# Patient Record
Sex: MALE | Race: WHITE | NOT HISPANIC OR LATINO | Employment: PART TIME | URBAN - METROPOLITAN AREA
[De-identification: names, ages, dates, MRNs, and addresses within clinical notes are randomized per-mention and may not be internally consistent; named-entity substitution may affect disease eponyms.]

---

## 2017-01-05 PROBLEM — I10 HYPERTENSION GOAL BP (BLOOD PRESSURE) < 140/90: Status: ACTIVE | Noted: 2017-01-05

## 2017-01-08 PROCEDURE — 99000 SPECIMEN HANDLING OFFICE-LAB: CPT | Performed by: FAMILY MEDICINE

## 2017-01-08 PROCEDURE — 82274 ASSAY TEST FOR BLOOD FECAL: CPT | Mod: 90 | Performed by: FAMILY MEDICINE

## 2017-01-09 DIAGNOSIS — Z12.11 COLON CANCER SCREENING: ICD-10-CM

## 2017-01-09 LAB — HEMOCCULT STL QL IA: POSITIVE

## 2017-01-10 ENCOUNTER — TELEPHONE (OUTPATIENT)
Dept: FAMILY MEDICINE | Facility: CLINIC | Age: 62
End: 2017-01-10

## 2017-01-10 NOTE — TELEPHONE ENCOUNTER
Notes Recorded by Clement De Jesus MD on 1/10/2017 at 7:28 AM  Please call patient. +fit card = blood in stool. Patient will now need a colonoscopy for screening for colon cancer. Patient should return to clinic asap if black or bloody stools. Please help set-up in next few weeks. Can see [facility] or our providers.     Patient:  Tad Olivas  [phone] (home)     Provider:  Clement De Jesus MD  Please call/evisit with questions or concerns.

## 2017-01-10 NOTE — TELEPHONE ENCOUNTER
Pt notified of provider message as written.  Pt verbalized good understanding.  Pt requests scheduling number be sent to him through [employer].  Sent.  Naomy Fernandez RN

## 2017-01-16 ENCOUNTER — PRE VISIT (OUTPATIENT)
Dept: UROLOGY | Facility: CLINIC | Age: 62
End: 2017-01-16

## 2017-01-16 ENCOUNTER — MYC MEDICAL ADVICE (OUTPATIENT)
Dept: FAMILY MEDICINE | Facility: CLINIC | Age: 62
End: 2017-01-16

## 2017-01-16 NOTE — TELEPHONE ENCOUNTER
Routing question about flomax to PCP to advise.    RN informed him to stay on BP medications to control HTN.    Gail Molina RN

## 2017-01-16 NOTE — TELEPHONE ENCOUNTER
Patient with a history of urethroplasty coming in for a cystoscopy. Chart reviewed and all records available. Message left reminding pt to come with a full bladder.

## 2017-01-16 NOTE — TELEPHONE ENCOUNTER
Routing to PCP to advise on medication for BP since he is NOT taking any currently.    Gail Molina RN

## 2017-01-23 ENCOUNTER — OFFICE VISIT (OUTPATIENT)
Dept: UROLOGY | Facility: CLINIC | Age: 62
End: 2017-01-23

## 2017-01-23 VITALS — HEIGHT: 71 IN | BODY MASS INDEX: 38.23 KG/M2 | WEIGHT: 273.1 LBS

## 2017-01-23 DIAGNOSIS — Z87.448 HISTORY OF URETHRAL STRICTURE: Primary | ICD-10-CM

## 2017-01-23 ASSESSMENT — PAIN SCALES - GENERAL
PAINLEVEL: NO PAIN (0)
PAINLEVEL: NO PAIN (0)

## 2017-01-23 NOTE — PATIENT INSTRUCTIONS
Return in 9 months with a full bladder for a cystoscopy and a urine flow test.    It was a pleasure meeting with you today.  Thank you for allowing me and my team the privilege of caring for you today.  YOU are the reason we are here, and I truly hope we provided you with the excellent service you deserve.  Please let us know if there is anything else we can do for you so that we can be sure you are leaving completely satisfied with your care experience.

## 2017-01-23 NOTE — MR AVS SNAPSHOT
After Visit Summary   1/23/2017    Tad Olivas    MRN: [id number]           Patient Information     Date Of Birth          1955        Visit Information        Provider Department      1/23/2017 1:30 PM Christiano Leger MD [facility] and [facility] for Prostate and Urologic Cancers        Today's Diagnoses     History of urethral stricture    -  1       Care Instructions    Return in 9 months with a full bladder for a cystoscopy and a urine flow test.    It was a pleasure meeting with you today.  Thank you for allowing me and my team the privilege of caring for you today.  YOU are the reason we are here, and I truly hope we provided you with the excellent service you deserve.  Please let us know if there is anything else we can do for you so that we can be sure you are leaving completely satisfied with your care experience.                Follow-ups after your visit        Your next 10 appointments already scheduled     Oct 23, 2017  2:30 PM   (Arrive by 2:15 PM)   Cystoscopy with Christiano Leger MD   [facility] for Prostate and Urologic Cancers ([facility])    [address] [place] [address] [phone]              Who to contact     Please call your clinic at [phone] to:    Ask questions about your health    Make or cancel appointments    Discuss your medicines    Learn about your test results    Speak to your doctor   If you have compliments or concerns about an experience at your clinic, or if you wish to file a complaint, please contact [facility] Patient Relations at [phone] or email us at [email or web address]         Additional Information About Your Visit        MyChart Information     [employer] gives you secure access to your electronic health record. If you see a primary care provider, you can also send messages to your care team and make appointments. If  "you have questions, please call your primary care clinic.  If you do not have a primary care provider, please call [phone] and they will assist you.      [employer] is an electronic gateway that provides easy, online access to your medical records. With [employer], you can request a clinic appointment, read your test results, renew a prescription or communicate with your care team.     To access your existing account, please contact your [facility] or call [phone] for assistance.        Care EveryWhere ID     This is your Care EveryWhere ID. This could be used by other organizations to access your [place] medical records  [id number]        Your Vitals Were     Height BMI (Body Mass Index)                1.803 m (5' 11\") 38.11 kg/m2           Blood Pressure from Last 3 Encounters:   12/29/16 129/87   11/07/16 112/75   10/14/16 116/88    Weight from Last 3 Encounters:   01/23/17 123.877 kg (273 lb 1.6 oz)   12/29/16 124.286 kg (274 lb)   11/07/16 117.935 kg (260 lb)              We Performed the Following     CYSTOURETHROSCOPY          Today's Medication Changes          These changes are accurate as of: 1/23/17  2:38 PM.  If you have any questions, ask your nurse or doctor.               These medicines have changed or have updated prescriptions.        Dose/Directions    ascorbic acid 500 MG tablet   Commonly known as:  VITAMIN C   This may have changed:  how much to take   Used for:  Frequent UTI, Preventative health care        Dose:  500 mg   Take 1 tablet by mouth daily.   Quantity:  100 tablet   Refills:  3                Primary Care Provider Office Phone # Fax #    Clement De Jesus [phone]       [facility] [address] [address] [facility] [address]        Thank you!     Thank you for choosing [facility] AND [facility] FOR PROSTATE AND UROLOGIC CANCERS  for your care. Our goal is always to provide you with excellent care. Hearing back from " our patients is one way we can continue to improve our services. Please take a few minutes to complete the written survey that you may receive in the mail after your visit with us. Thank you!             Your Updated Medication List - Protect others around you: Learn how to safely use, store and throw away your medicines at www.disposemymeds.org.          This list is accurate as of: 1/23/17  2:38 PM.  Always use your most recent med list.                   Brand Name Dispense Instructions for use    ALEVE PO      Take 220 mg by mouth daily       amLODIPine 5 MG tablet    NORVASC    30 tablet    Take 1 tablet (5 mg) by mouth daily New for blood pressure take with breakfast       ascorbic acid 500 MG tablet    VITAMIN C    100 tablet    Take 1 tablet by mouth daily.       aspirin 81 MG tablet      Take 81 mg by mouth daily       atorvastatin 80 MG tablet    LIPITOR    90 tablet    Take 1 tablet (80 mg) by mouth daily For cholesterol       finasteride 5 MG tablet    PROSCAR    90 tablet    Take 1 tablet (5 mg) by mouth daily       FLUoxetine 40 MG capsule    PROzac    90 capsule    Take 1 capsule (40 mg) by mouth daily       glucosamine-chondroitinoitin Caps     90 capsule    Take 1 each by mouth daily.       Multiple vitamin Tabs     90 tablet    Take 1 tablet by mouth daily.       omeprazole 20 MG tablet     45 tablet    Every other day. Take 30-60 minutes before a meal. To protect stomach and heartburn       order for DME     1 each    Equipment being ordered: automatic blood pressure cuff       tamsulosin 0.4 MG capsule    FLOMAX    180 capsule    Take 2 capsules (0.8 mg) by mouth daily This is double dosage for urine flow       VITAMIN D3 PO      Take 2,000 Units by mouth daily

## 2017-01-23 NOTE — Clinical Note
1/23/2017       RE: Tad Olivas  [address] [address]  [place] [address]     Dear Colleague,    Thank you for referring your patient, Tad Olivas, to the [facility] UROLOGY AND INST FOR PROSTATE AND UROLOGIC CANCERS at [facility]. Please see a copy of my visit note below.    Urethroplasty Follow-up Visit with Surveillance Urethroscopy    PRE-PROCEDURE DIAGNOSIS: History of urethral stricture  POST-PROCEDURE DIAGNOSIS: No evidence of urethral stricture   PROCEDURE: Urethroscopy    HISTORY: Tad Olivas is a 61 year old man 3 months status-post buccal graft dorsal onlay urethroplasty for urethral stricture. He has dribbling and anejaculation.     Pain in the perineum is absent.   Numbness in the perineum is absent.     Questionnaires reviewed. See flowsheet for details.    REVIEW OF OFFICE STUDIES:  Urinary Flow Rate  Peak Flow: 8.6 mL/s  Average Flow: 4.7 mL/s  Voided (mL): 94 mL  Residual Volume by Ultrasound: 79 mL     DESCRIPTION OF PROCEDURE:  After informed consent was obtained, the patient was brought to the procedure room where he was placed in the supine position with all pressure points well padded.  He was prepped and draped in a sterile fashion. A flexible cystoscope was introduced through a well-lubricated urethra.  The anterior urethra up to the point of reconstruction was normal in appearance. At the site of reconstruction there was not evidence of stricture recurrence. The caliber of the urethra at the reconstruction was estimated to be 20 F. The flexible cystoscope passed easily. The repair was from the mid shaft to the distal bulb. There were some annular rings proximal to the repair bu these were also >20F. The voluntary sphincter was identified and the scope withdrawn.    ASSESSMENT AND PLAN:  Excellent outcome after urethroplasty. The patient will follow-up in 9 months with with uroflowmetry, post-void residual urine volume measurement, Urethroplasty  "PROM, BERHANE, MSHQ, and CLSS and post-op questionnaires. Cystoscopy will be needed. If symptoms recur cystoscopy will be done sooner. Encouraged him to do Kegel's and \"milk\" the urethra after voiding or ejaculation.         Again, thank you for allowing me to participate in the care of your patient.      Sincerely,    Christiano Leger MD      "

## 2017-01-23 NOTE — NURSING NOTE
Chief Complaint   Patient presents with     Cystoscopy     History of urethrolplasty     Invasive Procedure Safety Checklist:    Procedure:     Action: Complete sections and checkboxes as appropriate.    Pre-procedure:  1. Patient ID Verified with 2 identifiers (Jayde and  or MRN) : YES    2. Procedure and site verified with patient/designee (when able) : YES    3. Accurate consent documentation in medical record : YES    4. H&P (or appropriate assessment) documented in medical record : YES  H&P must be up to 30 days prior to procedure an updated within 24 hours of                 Procedure as applicable.     5. Relevant diagnostic and radiology test results appropriately labeled and displayed as applicable : YES    6. Blood products, implants, devices, and/or special equipment available for the procedure as applicable : YES    7. Procedure site(s) marked with provider initials [Exclusions: None] : NO    8. Marking not required. Reason : Yes  Procedure does not require site marking    Time Out:     Time-Out performed immediately prior to starting procedure, including verbal and active participation of all team members addressing: YES    1. Correct patient identity.  2. Confirmed that the correct side and site are marked.  3. An accurate procedure to be done.  4. Agreement on the procedure to be done.  5. Correct patient position.  6. Relevant images and results are properly labeled and appropriately displayed.  7. The need to administer antibiotics or fluids for irrigation purposes during the procedure as applicable.  8. Safety precautions based on patient history or medication use.    During Procedure: Verification of correct person, site, and procedure occurs any time the responsibility for care of the patient is transferred to another member of the care team.

## 2017-01-23 NOTE — PROGRESS NOTES
"Urethroplasty Follow-up Visit with Surveillance Urethroscopy    PRE-PROCEDURE DIAGNOSIS: History of urethral stricture  POST-PROCEDURE DIAGNOSIS: No evidence of urethral stricture   PROCEDURE: Urethroscopy    HISTORY: Tad Olivas is a 61 year old man 3 months status-post buccal graft dorsal onlay urethroplasty for urethral stricture. He has dribbling and anejaculation.     Pain in the perineum is absent.   Numbness in the perineum is absent.     Questionnaires reviewed. See flowsheet for details.    REVIEW OF OFFICE STUDIES:  Urinary Flow Rate  Peak Flow: 8.6 mL/s  Average Flow: 4.7 mL/s  Voided (mL): 94 mL  Residual Volume by Ultrasound: 79 mL     DESCRIPTION OF PROCEDURE:  After informed consent was obtained, the patient was brought to the procedure room where he was placed in the supine position with all pressure points well padded.  He was prepped and draped in a sterile fashion. A flexible cystoscope was introduced through a well-lubricated urethra.  The anterior urethra up to the point of reconstruction was normal in appearance. At the site of reconstruction there was not evidence of stricture recurrence. The caliber of the urethra at the reconstruction was estimated to be 20 F. The flexible cystoscope passed easily. The repair was from the mid shaft to the distal bulb. There were some annular rings proximal to the repair bu these were also >20F. The voluntary sphincter was identified and the scope withdrawn.    ASSESSMENT AND PLAN:  Excellent outcome after urethroplasty. The patient will follow-up in 9 months with with uroflowmetry, post-void residual urine volume measurement, Urethroplasty PROM, BERHANE, MSHQ, and CLSS and post-op questionnaires. Cystoscopy will be needed. If symptoms recur cystoscopy will be done sooner. Encouraged him to do Kegel's and \"milk\" the urethra after voiding or ejaculation.     "

## 2017-02-13 DIAGNOSIS — R10.13 EPIGASTRIC PAIN: ICD-10-CM

## 2017-02-13 DIAGNOSIS — K29.70 GASTRITIS: ICD-10-CM

## 2017-02-13 RX ORDER — NICOTINE POLACRILEX 4 MG/1
GUM, CHEWING ORAL
Qty: 45 TABLET | Refills: 3 | Status: SHIPPED | OUTPATIENT
Start: 2017-02-13 | End: 2018-02-27

## 2017-02-24 ENCOUNTER — TRANSFERRED RECORDS (OUTPATIENT)
Dept: HEALTH INFORMATION MANAGEMENT | Facility: CLINIC | Age: 62
End: 2017-02-24

## 2017-07-27 DIAGNOSIS — I10 HYPERTENSION GOAL BP (BLOOD PRESSURE) < 140/90: ICD-10-CM

## 2017-07-27 DIAGNOSIS — F33.1 MAJOR DEPRESSIVE DISORDER, RECURRENT EPISODE, MODERATE (H): ICD-10-CM

## 2017-07-31 RX ORDER — FLUOXETINE 40 MG/1
40 CAPSULE ORAL DAILY
Qty: 90 CAPSULE | Refills: 0 | Status: SHIPPED | OUTPATIENT
Start: 2017-07-31 | End: 2017-10-27

## 2017-07-31 RX ORDER — AMLODIPINE BESYLATE 5 MG/1
5 TABLET ORAL DAILY
Qty: 30 TABLET | Refills: 0 | Status: SHIPPED | OUTPATIENT
Start: 2017-07-31 | End: 2017-09-11

## 2017-08-16 DIAGNOSIS — N34.2 URETHRITIS: ICD-10-CM

## 2017-08-16 RX ORDER — TAMSULOSIN HYDROCHLORIDE 0.4 MG/1
CAPSULE ORAL
Qty: 180 CAPSULE | Refills: 0 | Status: SHIPPED | OUTPATIENT
Start: 2017-08-16 | End: 2017-10-27

## 2017-09-11 DIAGNOSIS — I10 HYPERTENSION GOAL BP (BLOOD PRESSURE) < 140/90: ICD-10-CM

## 2017-09-12 RX ORDER — AMLODIPINE BESYLATE 5 MG/1
5 TABLET ORAL DAILY
Qty: 30 TABLET | Refills: 0 | Status: SHIPPED | OUTPATIENT
Start: 2017-09-12 | End: 2017-10-18

## 2017-09-12 NOTE — TELEPHONE ENCOUNTER
Pending appointment this month with Dr. De Jesus. 30 day supply sent.     Kerline Gonzalez RN   [facility]

## 2017-09-25 ENCOUNTER — MYC MEDICAL ADVICE (OUTPATIENT)
Dept: FAMILY MEDICINE | Facility: CLINIC | Age: 62
End: 2017-09-25

## 2017-10-18 DIAGNOSIS — I10 HYPERTENSION GOAL BP (BLOOD PRESSURE) < 140/90: ICD-10-CM

## 2017-10-18 RX ORDER — AMLODIPINE BESYLATE 5 MG/1
TABLET ORAL
Qty: 30 TABLET | Refills: 0 | Status: SHIPPED | OUTPATIENT
Start: 2017-10-18 | End: 2017-10-27

## 2017-10-24 ENCOUNTER — PRE VISIT (OUTPATIENT)
Dept: UROLOGY | Facility: CLINIC | Age: 62
End: 2017-10-24

## 2017-10-24 NOTE — TELEPHONE ENCOUNTER
Pt with a history of urethroplasty coming in for a cystoscopy. All records available. Message left asking pt to come with a full bladder for a flow/pvr.

## 2017-10-27 ENCOUNTER — OFFICE VISIT (OUTPATIENT)
Dept: FAMILY MEDICINE | Facility: CLINIC | Age: 62
End: 2017-10-27
Payer: COMMERCIAL

## 2017-10-27 VITALS
DIASTOLIC BLOOD PRESSURE: 79 MMHG | BODY MASS INDEX: 38.36 KG/M2 | WEIGHT: 274 LBS | HEIGHT: 71 IN | HEART RATE: 70 BPM | SYSTOLIC BLOOD PRESSURE: 138 MMHG | TEMPERATURE: 97.8 F | OXYGEN SATURATION: 93 %

## 2017-10-27 DIAGNOSIS — Z23 NEED FOR SHINGLES VACCINE: ICD-10-CM

## 2017-10-27 DIAGNOSIS — K21.9 GASTROESOPHAGEAL REFLUX DISEASE WITHOUT ESOPHAGITIS: ICD-10-CM

## 2017-10-27 DIAGNOSIS — Z23 NEED FOR PROPHYLACTIC VACCINATION AND INOCULATION AGAINST INFLUENZA: ICD-10-CM

## 2017-10-27 DIAGNOSIS — E78.5 HYPERLIPIDEMIA LDL GOAL <130: ICD-10-CM

## 2017-10-27 DIAGNOSIS — M79.673 HEEL PAIN, UNSPECIFIED LATERALITY: ICD-10-CM

## 2017-10-27 DIAGNOSIS — F33.1 MAJOR DEPRESSIVE DISORDER, RECURRENT EPISODE, MODERATE (H): ICD-10-CM

## 2017-10-27 DIAGNOSIS — I10 HYPERTENSION GOAL BP (BLOOD PRESSURE) < 140/90: Primary | ICD-10-CM

## 2017-10-27 DIAGNOSIS — N34.2 URETHRITIS: ICD-10-CM

## 2017-10-27 LAB
ALBUMIN SERPL-MCNC: 3.8 G/DL (ref 3.4–5)
ALP SERPL-CCNC: 87 U/L (ref 40–150)
ALT SERPL W P-5'-P-CCNC: 29 U/L (ref 0–70)
ANION GAP SERPL CALCULATED.3IONS-SCNC: 6 MMOL/L (ref 3–14)
AST SERPL W P-5'-P-CCNC: 16 U/L (ref 0–45)
BILIRUB SERPL-MCNC: 1.1 MG/DL (ref 0.2–1.3)
BUN SERPL-MCNC: 25 MG/DL (ref 7–30)
CALCIUM SERPL-MCNC: 8.8 MG/DL (ref 8.5–10.1)
CHLORIDE SERPL-SCNC: 104 MMOL/L (ref 94–109)
CHOLEST SERPL-MCNC: 199 MG/DL
CO2 SERPL-SCNC: 28 MMOL/L (ref 20–32)
CREAT SERPL-MCNC: 1.25 MG/DL (ref 0.66–1.25)
ERYTHROCYTE [DISTWIDTH] IN BLOOD BY AUTOMATED COUNT: 12.8 % (ref 10–15)
GFR SERPL CREATININE-BSD FRML MDRD: 58 ML/MIN/1.7M2
GLUCOSE SERPL-MCNC: 96 MG/DL (ref 70–99)
HCT VFR BLD AUTO: 43.6 % (ref 40–53)
HDLC SERPL-MCNC: 51 MG/DL
HGB BLD-MCNC: 14.6 G/DL (ref 13.3–17.7)
LDLC SERPL CALC-MCNC: 131 MG/DL
MCH RBC QN AUTO: 29.3 PG (ref 26.5–33)
MCHC RBC AUTO-ENTMCNC: 33.5 G/DL (ref 31.5–36.5)
MCV RBC AUTO: 88 FL (ref 78–100)
NONHDLC SERPL-MCNC: 148 MG/DL
PLATELET # BLD AUTO: 167 10E9/L (ref 150–450)
POTASSIUM SERPL-SCNC: 4.8 MMOL/L (ref 3.4–5.3)
PROT SERPL-MCNC: 6.8 G/DL (ref 6.8–8.8)
RBC # BLD AUTO: 4.98 10E12/L (ref 4.4–5.9)
SODIUM SERPL-SCNC: 138 MMOL/L (ref 133–144)
TRIGL SERPL-MCNC: 83 MG/DL
WBC # BLD AUTO: 5.3 10E9/L (ref 4–11)

## 2017-10-27 PROCEDURE — 36415 COLL VENOUS BLD VENIPUNCTURE: CPT | Performed by: FAMILY MEDICINE

## 2017-10-27 PROCEDURE — 85027 COMPLETE CBC AUTOMATED: CPT | Performed by: FAMILY MEDICINE

## 2017-10-27 PROCEDURE — 90736 HZV VACCINE LIVE SUBQ: CPT | Performed by: FAMILY MEDICINE

## 2017-10-27 PROCEDURE — 90471 IMMUNIZATION ADMIN: CPT | Performed by: FAMILY MEDICINE

## 2017-10-27 PROCEDURE — 80061 LIPID PANEL: CPT | Performed by: FAMILY MEDICINE

## 2017-10-27 PROCEDURE — 90686 IIV4 VACC NO PRSV 0.5 ML IM: CPT | Performed by: FAMILY MEDICINE

## 2017-10-27 PROCEDURE — 80053 COMPREHEN METABOLIC PANEL: CPT | Performed by: FAMILY MEDICINE

## 2017-10-27 PROCEDURE — 99214 OFFICE O/P EST MOD 30 MIN: CPT | Mod: 25 | Performed by: FAMILY MEDICINE

## 2017-10-27 PROCEDURE — 90472 IMMUNIZATION ADMIN EACH ADD: CPT | Performed by: FAMILY MEDICINE

## 2017-10-27 RX ORDER — FLUOXETINE 40 MG/1
40 CAPSULE ORAL DAILY
Qty: 90 CAPSULE | Refills: 1 | Status: SHIPPED | OUTPATIENT
Start: 2017-10-27 | End: 2018-06-18

## 2017-10-27 RX ORDER — AMLODIPINE BESYLATE 5 MG/1
5 TABLET ORAL DAILY
Qty: 90 TABLET | Refills: 1 | Status: SHIPPED | OUTPATIENT
Start: 2017-10-27 | End: 2018-07-19

## 2017-10-27 RX ORDER — TAMSULOSIN HYDROCHLORIDE 0.4 MG/1
CAPSULE ORAL
Qty: 180 CAPSULE | Refills: 3 | Status: SHIPPED | OUTPATIENT
Start: 2017-10-27 | End: 2018-11-06

## 2017-10-27 ASSESSMENT — PATIENT HEALTH QUESTIONNAIRE - PHQ9
SUM OF ALL RESPONSES TO PHQ QUESTIONS 1-9: 3
5. POOR APPETITE OR OVEREATING: SEVERAL DAYS

## 2017-10-27 ASSESSMENT — ANXIETY QUESTIONNAIRES
GAD7 TOTAL SCORE: 4
3. WORRYING TOO MUCH ABOUT DIFFERENT THINGS: SEVERAL DAYS
7. FEELING AFRAID AS IF SOMETHING AWFUL MIGHT HAPPEN: NOT AT ALL
6. BECOMING EASILY ANNOYED OR IRRITABLE: NOT AT ALL
1. FEELING NERVOUS, ANXIOUS, OR ON EDGE: SEVERAL DAYS
5. BEING SO RESTLESS THAT IT IS HARD TO SIT STILL: NOT AT ALL
2. NOT BEING ABLE TO STOP OR CONTROL WORRYING: SEVERAL DAYS

## 2017-10-27 NOTE — NURSING NOTE
"Chief Complaint   Patient presents with     Hypertension     Lipids     Flu Shot       Initial /90  Pulse 70  Temp 97.8  F (36.6  C) (Oral)  Ht 5' 11\" (1.803 m)  Wt 274 lb (124.3 kg)  SpO2 93%  BMI 38.22 kg/m2 Estimated body mass index is 38.22 kg/(m^2) as calculated from the following:    Height as of this encounter: 5' 11\" (1.803 m).    Weight as of this encounter: 274 lb (124.3 kg).  Medication Reconciliation: complete  Naomy Riley, MISBAH    "

## 2017-10-27 NOTE — NURSING NOTE
Screening Questionnaire for Adult Immunization    Are you sick today?   No   Do you have allergies to medications, food, a vaccine component or latex?   No   Have you ever had a serious reaction after receiving a vaccination?   No   Do you have a long-term health problem with heart disease, lung disease, asthma, kidney disease, metabolic disease (e.g. diabetes), anemia, or other blood disorder?   No   Do you have cancer, leukemia, HIV/AIDS, or any other immune system problem?   No   In the past 3 months, have you taken medications that affect  your immune system, such as prednisone, other steroids, or anticancer drugs; drugs for the treatment of rheumatoid arthritis, Crohn s disease, or psoriasis; or have you had radiation treatments?   No   Have you had a seizure, or a brain or other nervous system problem?   No   During the past year, have you received a transfusion of blood or blood     products, or been given immune (gamma) globulin or antiviral drug?   No   For women: Are you pregnant or is there a chance you could become        pregnant during the next month?   No   Have you received any vaccinations in the past 4 weeks?   No     Immunization questionnaire answers were all negative.        Per orders of Dr. De Jesus, injection of shingles  given by Naomy Riley. Patient instructed to remain in clinic for 15 minutes afterwards, and to report any adverse reaction to me immediately.       Screening performed by Naomy Riley on 10/27/2017 at 10:03 AM.

## 2017-10-27 NOTE — MR AVS SNAPSHOT
After Visit Summary   10/27/2017    Tad Olivas    MRN: [id number]           Patient Information     Date Of Birth          1955        Visit Information        Provider Department      10/27/2017 9:00 AM Clement De Jesus MD [facility]        Today's Diagnoses     Hypertension goal BP (blood pressure) < 140/90    -  1    Hyperlipidemia LDL goal <130        Urethritis        Major depressive disorder, recurrent episode, moderate (H)        Gastroesophageal reflux disease without esophagitis        Heel pain, unspecified laterality        Need for prophylactic vaccination and inoculation against influenza        Need for shingles vaccine           Follow-ups after your visit        Your next 10 appointments already scheduled     Oct 30, 2017  2:30 PM CDT   (Arrive by 2:15 PM)   Cystoscopy with Christiano Leger MD   [facility] and [facility] for Prostate and Urologic Cancers ([facility])    [address] [place] [address] [phone]              Who to contact     If you have questions or need follow up information about today's clinic visit or your schedule please contact [facility] directly at [phone].  Normal or non-critical lab and imaging results will be communicated to you by [employer], letter or phone within 4 business days after the clinic has received the results. If you do not hear from us within 7 days, please contact the clinic through [employer] or phone. If you have a critical or abnormal lab result, we will notify you by phone as soon as possible.  Submit refill requests through [employer] or call your pharmacy and they will forward the refill request to us. Please allow 3 business days for your refill to be completed.          Additional Information About Your Visit        C[employer]art Information     [employer] gives you secure access to your electronic health record. If you see a primary care  "provider, you can also send messages to your care team and make appointments. If you have questions, please call your primary care clinic.  If you do not have a primary care provider, please call [phone] and they will assist you.        Care EveryWhere ID     This is your Care EveryWhere ID. This could be used by other organizations to access your [place] medical records  [id number]        Your Vitals Were     Pulse Temperature Height Pulse Oximetry BMI (Body Mass Index)       70 97.8  F (36.6  C) (Oral) 5' 11\" (1.803 m) 93% 38.22 kg/m2        Blood Pressure from Last 3 Encounters:   10/27/17 138/79   12/29/16 129/87   11/07/16 112/75    Weight from Last 3 Encounters:   10/27/17 274 lb (124.3 kg)   01/23/17 273 lb 1.6 oz (123.9 kg)   12/29/16 274 lb (124.3 kg)              We Performed the Following     CBC with platelets     Comprehensive metabolic panel (BMP + Alb, Alk Phos, ALT, AST, Total. Bili, TP)     DEPRESSION ACTION PLAN (DAP)     FLU VAC, SPLIT VIRUS IM > 3 YO (QUADRIVALENT) [95730]     Lipid Profile (Chol, Trig, HDL, LDL calc)     Vaccine Administration, Initial [59113]     ZOSTER VACC LIVE SUBQ NJX          Today's Medication Changes          These changes are accurate as of: 10/27/17 11:53 AM.  If you have any questions, ask your nurse or doctor.               These medicines have changed or have updated prescriptions.        Dose/Directions    amLODIPine 5 MG tablet   Commonly known as:  NORVASC   This may have changed:  See the new instructions.   Used for:  Hypertension goal BP (blood pressure) < 140/90   Changed by:  Clement De Jesus MD        Dose:  5 mg   Take 1 tablet (5 mg) by mouth daily For blood pressure Pharmacy ok to hold prescription until due   Quantity:  90 tablet   Refills:  1       ascorbic acid 500 MG tablet   Commonly known as:  VITAMIN C   This may have changed:  how much to take   Used for:  Frequent UTI, Preventative health care        Dose:  500 mg   Take 1 tablet by " mouth daily.   Quantity:  100 tablet   Refills:  3       FLUoxetine 40 MG capsule   Commonly known as:  PROzac   This may have changed:  additional instructions   Used for:  Major depressive disorder, recurrent episode, moderate (H)   Changed by:  Clement De Jesus MD        Dose:  40 mg   Take 1 capsule (40 mg) by mouth daily Pharmacy ok to hold prescription until due   Quantity:  90 capsule   Refills:  1       tamsulosin 0.4 MG capsule   Commonly known as:  FLOMAX   This may have changed:  See the new instructions.   Used for:  Urethritis   Changed by:  Clement De Jesus MD        TAKE TWO CAPSULES BY MOUTH EVERY DAY FOR URINE FLOW   Quantity:  180 capsule   Refills:  3            Where to get your medicines      These medications were sent to [id number] - [place] - [address] [address]     Phone:  [phone]     amLODIPine 5 MG tablet    FLUoxetine 40 MG capsule    tamsulosin 0.4 MG capsule                Primary Care Provider Office Phone # Fax #    Clement De Jesus [phone] [address] [facility] [address]        Equal Access to Services     [facility]: Hadii aad ku hadasho Soomaali, waaxda luqadaha, qaybta kaalmada adeegyada, waxay kirby haytrina grimm . So [facility] [phone].    ATENCIÓN: Si habla español, tiene a greer disposición servicios gratuitos de asistencia lingüística. Llame al [phone].    We comply with applicable federal civil rights laws and [place] laws. We do not discriminate on the basis of race, color, national origin, age, disability, sex, sexual orientation, or gender identity.            Thank you!     Thank you for choosing [facility]  for your care. Our goal is always to provide you with excellent care. Hearing back from our patients is one way we can continue to improve our services. Please take a few minutes to complete the written survey that you may receive in the mail  after your visit with us. Thank you!             Your Updated Medication List - Protect others around you: Learn how to safely use, store and throw away your medicines at www.disposemymeds.org.          This list is accurate as of: 10/27/17 11:53 AM.  Always use your most recent med list.                   Brand Name Dispense Instructions for use Diagnosis    ALEVE PO      Take 220 mg by mouth daily        amLODIPine 5 MG tablet    NORVASC    90 tablet    Take 1 tablet (5 mg) by mouth daily For blood pressure Pharmacy ok to hold prescription until due    Hypertension goal BP (blood pressure) < 140/90       ascorbic acid 500 MG tablet    VITAMIN C    100 tablet    Take 1 tablet by mouth daily.    Frequent UTI, Preventative health care       aspirin 81 MG tablet      Take 81 mg by mouth daily        atorvastatin 80 MG tablet    LIPITOR    90 tablet    Take 1 tablet (80 mg) by mouth daily For cholesterol    Hyperlipidemia LDL goal <130       finasteride 5 MG tablet    PROSCAR    90 tablet    Take 1 tablet (5 mg) by mouth daily    Enlarged prostate       FLUoxetine 40 MG capsule    PROzac    90 capsule    Take 1 capsule (40 mg) by mouth daily Pharmacy ok to hold prescription until due    Major depressive disorder, recurrent episode, moderate (H)       glucosamine-chondroitinoitin Caps     90 capsule    Take 1 each by mouth daily.    Elbow pain       Multiple vitamin Tabs     90 tablet    Take 1 tablet by mouth daily.    Preventative health care       omeprazole 20 MG tablet     45 tablet    Every other day. Take 30-60 minutes before a meal. To protect stomach and heartburn    Epigastric pain, Gastritis       order for DME     1 each    Equipment being ordered: automatic blood pressure cuff    Hypertension goal BP (blood pressure) < 140/90       tamsulosin 0.4 MG capsule    FLOMAX    180 capsule    TAKE TWO CAPSULES BY MOUTH EVERY DAY FOR URINE FLOW    Urethritis       VITAMIN D3 PO      Take 2,000 Units by mouth daily

## 2017-10-27 NOTE — PROGRESS NOTES
SUBJECTIVE:   CC: Tad Olivas is an 62 year old male who presents for:  Blood pressure/ chol , spot on back, depression and right heel pain.  Seeing urology for urethral stricture.   Outside blood pressure reading - has cuff at home. Exercise needs more.   No chest pain or shortness of breath. gerd stable. No dysphagia.   No black or bloody stools. Urine stable - seeing urology next week. No hematuria.   Emotionally doing ok.   right heel pain on/off. No injury. Not too painful in AM.   S/p knee replacement. gerd stable qod prilosec.  Need for prophylactic vaccination and inoculation against influenza    Past Medical History:   Diagnosis Date     Biceps rupture, distal      Degenerative joint disease     right knee OA     Depressive disorder      Elevated cholesterol      Erectile dysfunction 12/29/2010     Hypertension      Hypertension goal BP (blood pressure) < 140/90      Moderate major depression (H) 12/29/2010     Renal cysts, acquired, bilateral 9/26/2012       Past Surgical History:   Procedure Laterality Date     BIOPSY  2017    colon     COLONOSCOPY  2017     ORTHOPEDIC SURGERY      right knee replacement     REPAIR TENDON BICEPS DISTAL UPPER EXTREMITY      10/15/10     SOFT TISSUE SURGERY       SURGICAL HISTORY OF -       rt shoulder cartilage repair     SURGICAL HISTORY OF -       cartilage .ligament ,arthroscopy     URETHROPLASTY WITH BUCCAL GRAFT N/A 10/14/2016    Procedure: URETHROPLASTY WITH BUCCAL GRAFT;  Surgeon: Christiano Leger MD;  Location: [facility]     UROLOGY PROCEDURE                         DATE:  10.15.2016    urithral stricture        Family History   Problem Relation Age of Onset     Coronary Artery Disease Mother      Hyperlipidemia Mother      DIABETES Father      CEREBROVASCULAR DISEASE Father      Breast Cancer Father      Anesthesia Reaction Father        Social History   Substance Use Topics     Smoking status: Former Smoker     Quit date: 12/30/1970     Smokeless tobacco:  "Never Used      Comment: no 2nd hand smoke exposure     Alcohol use Yes      Comment: occ.       ROS:  All other ROS negative  OBJECTIVE:  EXAM:  /79  Pulse 70  Temp 97.8  F (36.6  C) (Oral)  Ht 5' 11\" (1.803 m)  Wt 274 lb (124.3 kg)  SpO2 93%  BMI 38.22 kg/m2  GENERAL APPEARANCE: healthy, alert and no distress  EYES: EOMI,  PERRL  NECK: no adenopathy, no asymmetry, masses, or scars and thyroid normal to palpation  RESP: lungs clear to auscultation - no rales, rhonchi or wheezes  CV: regular rates and rhythm, normal S1 S2, no S3 or S4 and no murmur, click or rub -  ABDOMEN:  soft, nontender, no HSM or masses and bowel sounds normal  MS: extremities normal- no gross deformities noted, no evidence of inflammation in joints, FROM in all extremities.  MS: tenderness left plantar fascial insertion site  SKIN: no suspicious lesions or rashes  NEURO: Normal strength and tone, sensory exam grossly normal, mentation intact and speech normal  PSYCH: mentation appears normal and affect normal/bright  LYMPHATICS: No axillary, cervical or supraclavicular nodes    ASSESSMENT / PLAN:  (I10) Hypertension goal BP (blood pressure) < 140/90  (primary encounter diagnosis)  Comment: a little high  Plan: Comprehensive metabolic panel (BMP + Alb, Alk         Phos, ALT, AST, Total. Bili, TP), amLODIPine         (NORVASC) 5 MG tablet        Weight loss and self-monitor. Add hctz if worse. Recheck in 6 months      (E78.5) Hyperlipidemia LDL goal <130  Comment: fasting  Plan: Comprehensive metabolic panel (BMP + Alb, Alk         Phos, ALT, AST, Total. Bili, TP), Lipid Profile        (Chol, Trig, HDL, LDL calc)        Continue lipitor. Exercise and weight loss. Chest pain or shortness of breath to er.     (N34.2) Urethritis  Plan: tamsulosin (FLOMAX) 0.4 MG capsule        Stable per urology.    (F33.1) Major depressive disorder, recurrent episode, moderate (H)  Comment: stable  Plan: DEPRESSION ACTION PLAN (DAP), FLUoxetine         " (PROZAC) 40 MG capsule        If SUICIAL IDEATION OR HOMOCIDAL IDEATION OR YAKOV TO ER. Recheck in 6 months  Sooner if worse. Call/email with questions/concerns    (K21.9) Gastroesophageal reflux disease without esophagitis  Comment: stable  Plan: CBC with platelets        Continue prilosec qod. egd if worse. Weight loss.     (M79.583) Heel pain, unspecified laterality  Comment: likely plantar fasciitis   Plan: stretching calf muscles/tylenol and good arch support. Follow-up podiatry if worse.     (Z23) Need for prophylactic vaccination and inoculation against influenza  Plan: Vaccine Administration, Initial [89445], FLU         VAC, SPLIT VIRUS IM > 3 YO (QUADRIVALENT)         [90187]            Clement De Jesus    Physical   Annual:     Getting at least 3 servings of Calcium per day::  Yes    Bi-annual eye exam::  NO    Dental care twice a year::  Yes    Sleep apnea or symptoms of sleep apnea::  Daytime drowsiness    Diet::  Regular (no restrictions)    Frequency of exercise::  None    Taking medications regularly::  Yes    Medication side effects::  None    Additional concerns today::  YES      Today's PHQ-2 Score:   PHQ-2 ( 1999 Pfizer) 10/27/2017   Q1: Little interest or pleasure in doing things 0   Q2: Feeling down, depressed or hopeless 0   PHQ-2 Score 0   Q1: Little interest or pleasure in doing things Not at all   Q2: Feeling down, depressed or hopeless Not at all   PHQ-2 Score 0               Review of Systems    OBJECTIVE:     Physical Exam      ASSESSMENT/PLAN:             Injectable Influenza Immunization Documentation    1.  Is the person to be vaccinated sick today?   No    2. Does the person to be vaccinated have an allergy to a component   of the vaccine?   No  Egg Allergy Algorithm Link    3. Has the person to be vaccinated ever had a serious reaction   to influenza vaccine in the past?   No    4. Has the person to be vaccinated ever had Guillain-Barré syndrome?   No    Form completed by Naomy  Rosemary, CMA

## 2017-10-28 ASSESSMENT — ANXIETY QUESTIONNAIRES: GAD7 TOTAL SCORE: 4

## 2017-10-30 ENCOUNTER — OFFICE VISIT (OUTPATIENT)
Dept: UROLOGY | Facility: CLINIC | Age: 62
End: 2017-10-30

## 2017-10-30 VITALS
SYSTOLIC BLOOD PRESSURE: 126 MMHG | BODY MASS INDEX: 38.85 KG/M2 | WEIGHT: 277.5 LBS | HEIGHT: 71 IN | HEART RATE: 65 BPM | DIASTOLIC BLOOD PRESSURE: 84 MMHG

## 2017-10-30 DIAGNOSIS — Z87.448 HISTORY OF URETHRAL STRICTURE: Primary | ICD-10-CM

## 2017-10-30 ASSESSMENT — PAIN SCALES - GENERAL: PAINLEVEL: NO PAIN (0)

## 2017-10-30 NOTE — PROGRESS NOTES
Urethroplasty Follow-up Visit with Surveillance Urethroscopy    PRE-PROCEDURE DIAGNOSIS: History of urethral stricture  POST-PROCEDURE DIAGNOSIS: No evidence of urethral stricture   PROCEDURE: Urethroscopy    HISTORY: Tad Olivas is a 62 year old man 16 months status-post buccal graft dorsal onlay urethroplasty for urethral stricture.     BMG complaints: No  Positioning complaints: No  Perineal / Genital complaints: No  Urinary incontinence: No    He says that his flow has never been as good as he hoped for after the urethroplasty.     Questionnaires reviewed. See flowsheet for details.    REVIEW OF OFFICE STUDIES:  Urinary Flow Rate  Peak Flow: 8 mL/s  Average Flow: 4.4 mL/s  Voided (mL): 62 mL  Residual Volume by Ultrasound: 13 mL     DESCRIPTION OF PROCEDURE:  After informed consent was obtained, the patient was brought to the procedure room where he was placed in the supine position with all pressure points well padded.  He was prepped and draped in a sterile fashion. A flexible cystoscope was introduced through a well-lubricated urethra.  The anterior urethra up to the point of reconstruction was normal in appearance. At the site of reconstruction there was not evidence of stricture recurrence. The narrowest caliber of the urethra at the reconstruction was estimated to be 18F and this was located in several places throughout the buccal. The flexible cystoscope passed easily. The voluntary sphincter was identified and was normal. The prostate was small. The bladder was normal in appearance.     ASSESSMENT AND PLAN:  Excellent anatomic outcome after urethroplasty but still with slow flow. Has not seen improvement with Flomax. So now we will pursue UDS.   If UDS shows:  1. Flaccid bladder then option is clean intermittent catheterization or observation of slow flow  2. BPO then add finasteride  3. Urethral stricture obstruction then refer back to me.

## 2017-10-30 NOTE — NURSING NOTE
Chief Complaint   Patient presents with     Cystoscopy     Pt with a history of urethroplasty coming in for a cystoscopy.     Cyndi Teran

## 2017-10-30 NOTE — MR AVS SNAPSHOT
After Visit Summary   10/30/2017    Tad Olivas    MRN: [id number]           Patient Information     Date Of Birth          1955        Visit Information        Provider Department      10/30/2017 2:30 PM Christiano Leger MD [facility]        Care Instructions    Follow up with Janet Munson with a Urodynamics.    It was a pleasure meeting with you today.  Thank you for allowing me and my team the privilege of caring for you today.  YOU are the reason we are here, and I truly hope we provided you with the excellent service you deserve.  Please let us know if there is anything else we can do for you so that we can be sure you are leaving completely satisfied with your care experience.                  Follow-ups after your visit        Your next 10 appointments already scheduled     Nov 22, 2017  3:30 PM CST   (Arrive by 3:15 PM)   Urodynamics with Janet Dodd PA-C   [facility] ([facility])    [address] [place] [id number] [phone]              Who to contact     Please call your clinic at [phone] to:    Ask questions about your health    Make or cancel appointments    Discuss your medicines    Learn about your test results    Speak to your doctor   If you have compliments or concerns about an experience at your clinic, or if you wish to file a complaint, please contact [facility] Patient Relations at [phone] or email us at [email or web address] [email or web address]         Additional Information About Your Visit        MyChart Information     [employer] gives you secure access to your electronic health record. If you see a primary care provider, you can also send messages to your care team and make appointments. If you have questions, please call your primary care clinic.  If you do not have a  "primary care provider, please call [phone] and they will assist you.      [employer] is an electronic gateway that provides easy, online access to your medical records. With [employer], you can request a clinic appointment, read your test results, renew a prescription or communicate with your care team.     To access your existing account, please contact your [facility] or call [phone] for assistance.        Care EveryWhere ID     This is your Care EveryWhere ID. This could be used by other organizations to access your [facility] medical records  [id number]        Your Vitals Were     Pulse Height BMI (Body Mass Index)             65 1.803 m (5' 11\") 38.7 kg/m2          Blood Pressure from Last 3 Encounters:   10/30/17 126/84   10/27/17 138/79   12/29/16 129/87    Weight from Last 3 Encounters:   10/30/17 125.9 kg (277 lb 8 oz)   10/27/17 124.3 kg (274 lb)   01/23/17 123.9 kg (273 lb 1.6 oz)              Today, you had the following     No orders found for display         Today's Medication Changes          These changes are accurate as of: 10/30/17  3:31 PM.  If you have any questions, ask your nurse or doctor.               These medicines have changed or have updated prescriptions.        Dose/Directions    ascorbic acid 500 MG tablet   Commonly known as:  VITAMIN C   This may have changed:  how much to take   Used for:  Frequent UTI, Preventative health care        Dose:  500 mg   Take 1 tablet by mouth daily.   Quantity:  100 tablet   Refills:  3                Primary Care Provider Office Phone # Fax #    Lcement Matheus De Jesus [phone] [address] [facility] [address]        Equal Access to Services     BAYRON GREGG AH: tomasz Blake, rogelio tipton. So [facility] [phone].    ATENCIÓN: Si habla español, tiene a greer disposición servicios gratuitos de asistencia " lingüística. Gilda al [phone].    We comply with applicable federal civil rights laws and [place] laws. We do not discriminate on the basis of race, color, national origin, age, disability, sex, sexual orientation, or gender identity.            Thank you!     Thank you for choosing [facility] AND [facility] FOR PROSTATE AND UROLOGIC CANCERS  for your care. Our goal is always to provide you with excellent care. Hearing back from our patients is one way we can continue to improve our services. Please take a few minutes to complete the written survey that you may receive in the mail after your visit with us. Thank you!             Your Updated Medication List - Protect others around you: Learn how to safely use, store and throw away your medicines at www.disposemymeds.org.          This list is accurate as of: 10/30/17  3:31 PM.  Always use your most recent med list.                   Brand Name Dispense Instructions for use Diagnosis    ALEVE PO      Take 220 mg by mouth daily        amLODIPine 5 MG tablet    NORVASC    90 tablet    Take 1 tablet (5 mg) by mouth daily For blood pressure Pharmacy ok to hold prescription until due    Hypertension goal BP (blood pressure) < 140/90       ascorbic acid 500 MG tablet    VITAMIN C    100 tablet    Take 1 tablet by mouth daily.    Frequent UTI, Preventative health care       aspirin 81 MG tablet      Take 81 mg by mouth daily        atorvastatin 80 MG tablet    LIPITOR    90 tablet    Take 1 tablet (80 mg) by mouth daily For cholesterol    Hyperlipidemia LDL goal <130       finasteride 5 MG tablet    PROSCAR    90 tablet    Take 1 tablet (5 mg) by mouth daily    Enlarged prostate       FLUoxetine 40 MG capsule    PROzac    90 capsule    Take 1 capsule (40 mg) by mouth daily Pharmacy ok to hold prescription until due    Major depressive disorder, recurrent episode, moderate (H)       glucosamine-chondroitinoitin Caps     90 capsule    Take 1 each by mouth daily.     Elbow pain       Multiple vitamin Tabs     90 tablet    Take 1 tablet by mouth daily.    Preventative health care       * omeprazole 20 MG tablet     45 tablet    Every other day. Take 30-60 minutes before a meal. To protect stomach and heartburn    Epigastric pain, Gastritis       * omeprazole 20 MG CR capsule    priLOSEC     TAKE 1 TABLET BY MOUTH EVERY OTHER DAY 30 TO 60 MINUTES BEFORE A MEAL TO PROTECT STOMACH AND HEARTBURN        order for DME     1 each    Equipment being ordered: automatic blood pressure cuff    Hypertension goal BP (blood pressure) < 140/90       tamsulosin 0.4 MG capsule    FLOMAX    180 capsule    TAKE TWO CAPSULES BY MOUTH EVERY DAY FOR URINE FLOW    Urethritis       VITAMIN D3 PO      Take 2,000 Units by mouth daily        * Notice:  This list has 2 medication(s) that are the same as other medications prescribed for you. Read the directions carefully, and ask your doctor or other care provider to review them with you.

## 2017-10-30 NOTE — NURSING NOTE
Invasive Procedure Safety Checklist:    Procedure:     Action: Complete sections and checkboxes as appropriate.    Pre-procedure:  1. Patient ID Verified with 2 identifiers (Jayde and  or MRN) : YES    2. Procedure and site verified with patient/designee (when able) : YES    3. Accurate consent documentation in medical record : YES    4. H&P (or appropriate assessment) documented in medical record : YES  H&P must be up to 30 days prior to procedure an updated within 24 hours of                 Procedure as applicable.     5. Relevant diagnostic and radiology test results appropriately labeled and displayed as applicable : YES    6. Blood products, implants, devices, and/or special equipment available for the procedure as applicable : YES    7. Procedure site(s) marked with provider initials [Exclusions: N/A] : NO    8. Marking not required. Reason : Yes  Procedure does not require site marking    Time Out:     Time-Out performed immediately prior to starting procedure, including verbal and active participation of all team members addressing: YES      1. Correct patient identity.  2. Confirmed that the correct side and site are marked.  3. An accurate procedure to be done.  4. Agreement on the procedure to be done.  5. Correct patient position.  6. Relevant images and results are properly labeled and appropriately displayed.  7. The need to administer antibiotics or fluids for irrigation purposes during the procedure as applicable.  8. Safety precautions based on patient history or medication use.    During Procedure: Verification of correct person, site, and procedure occurs any time the responsibility for care of the patient is transferred to another member of the care team.

## 2017-10-30 NOTE — LETTER
10/30/2017       RE: Tad Olivas  [address] [address]     Dear Colleague,    Thank you for referring your patient, Tad Olivas, to the [facility] AND [facility] FOR PROSTATE AND UROLOGIC CANCERS at [facility]. Please see a copy of my visit note below.    Urethroplasty Follow-up Visit with Surveillance Urethroscopy    PRE-PROCEDURE DIAGNOSIS: History of urethral stricture  POST-PROCEDURE DIAGNOSIS: No evidence of urethral stricture   PROCEDURE: Urethroscopy    HISTORY: Tad Olivas is a 62 year old man 16 months status-post buccal graft dorsal onlay urethroplasty for urethral stricture.     BMG complaints: No  Positioning complaints: No  Perineal / Genital complaints: No  Urinary incontinence: No    He says that his flow has never been as good as he hoped for after the urethroplasty.     Questionnaires reviewed. See flowsheet for details.    REVIEW OF OFFICE STUDIES:  Urinary Flow Rate  Peak Flow: 8 mL/s  Average Flow: 4.4 mL/s  Voided (mL): 62 mL  Residual Volume by Ultrasound: 13 mL     DESCRIPTION OF PROCEDURE:  After informed consent was obtained, the patient was brought to the procedure room where he was placed in the supine position with all pressure points well padded.  He was prepped and draped in a sterile fashion. A flexible cystoscope was introduced through a well-lubricated urethra.  The anterior urethra up to the point of reconstruction was normal in appearance. At the site of reconstruction there was not evidence of stricture recurrence. The narrowest caliber of the urethra at the reconstruction was estimated to be 18F and this was located in several places throughout the buccal. The flexible cystoscope passed easily. The voluntary sphincter was identified and was normal. The prostate was small. The bladder was normal in appearance.     ASSESSMENT AND PLAN:  Excellent anatomic outcome after urethroplasty but still with slow flow. Has  not seen improvement with Flomax. So now we will pursue UDS.   If UDS shows:  1. Flaccid bladder then option is clean intermittent catheterization or observation of slow flow  2. BPO then add finasteride  3. Urethral stricture obstruction then refer back to me.         Again, thank you for allowing me to participate in the care of your patient.      Sincerely,    Christiano Leger MD

## 2017-10-30 NOTE — PATIENT INSTRUCTIONS
"Follow up with Janet Munson with a Urodynamics.    It was a pleasure meeting with you today.  Thank you for allowing me and my team the privilege of caring for you today.  YOU are the reason we are here, and I truly hope we provided you with the excellent service you deserve.  Please let us know if there is anything else we can do for you so that we can be sure you are leaving completely satisfied with your care experience.          AFTER YOUR CYSTOSCOPY        You have just completed a cystoscopy, or \"cysto\", which allowed your physician to learn more about your bladder (or to remove a stent placed after surgery). We suggest that you continue to avoid caffeine, fruit juice, and alcohol for the next 24 hours, however, you are encouraged to return to your normal activities.         A few things that are considered normal after your cystoscopy:     * Small amount of bleeding (or spotting) that clears within the next 24 hours     * Slight burning sensation with urination     * Sensation to of needing to avoid more frequently     * The feeling of \"air\" in your urine     * Mild discomfort that is relieved with Tylenol        Please contact our office promptly if you:     * Develop a fever above 101 degrees     * Are unable to urinate     * Develop bright red blood that does not stop     * Severe pain or swelling         Please contact our office with any concerns or questions @[email or web address].  "

## 2017-11-01 ENCOUNTER — PRE VISIT (OUTPATIENT)
Dept: UROLOGY | Facility: CLINIC | Age: 62
End: 2017-11-01

## 2017-11-08 ENCOUNTER — MYC MEDICAL ADVICE (OUTPATIENT)
Dept: FAMILY MEDICINE | Facility: CLINIC | Age: 62
End: 2017-11-08

## 2017-12-04 DIAGNOSIS — I10 HYPERTENSION GOAL BP (BLOOD PRESSURE) < 140/90: ICD-10-CM

## 2017-12-05 RX ORDER — AMLODIPINE BESYLATE 5 MG/1
TABLET ORAL
Qty: 30 TABLET | Refills: 0 | OUTPATIENT
Start: 2017-12-05

## 2017-12-22 ENCOUNTER — PRE VISIT (OUTPATIENT)
Dept: UROLOGY | Facility: CLINIC | Age: 62
End: 2017-12-22

## 2018-01-08 DIAGNOSIS — N40.0 ENLARGED PROSTATE: ICD-10-CM

## 2018-01-08 RX ORDER — FINASTERIDE 5 MG/1
5 TABLET, FILM COATED ORAL DAILY
Qty: 90 TABLET | Refills: 3 | Status: SHIPPED | OUTPATIENT
Start: 2018-01-08 | End: 2019-02-22

## 2018-01-08 RX ORDER — FINASTERIDE 5 MG/1
5 TABLET, FILM COATED ORAL DAILY
Qty: 90 TABLET | Refills: 3 | Status: CANCELLED | OUTPATIENT
Start: 2018-01-08

## 2018-02-24 ENCOUNTER — PRE VISIT (OUTPATIENT)
Dept: UROLOGY | Facility: CLINIC | Age: 63
End: 2018-02-24

## 2018-02-27 DIAGNOSIS — R10.13 EPIGASTRIC PAIN: ICD-10-CM

## 2018-02-27 DIAGNOSIS — K29.70 GASTRITIS: ICD-10-CM

## 2018-03-08 DIAGNOSIS — E78.5 HYPERLIPIDEMIA LDL GOAL <130: ICD-10-CM

## 2018-03-08 RX ORDER — ATORVASTATIN CALCIUM 80 MG/1
TABLET, FILM COATED ORAL
Qty: 90 TABLET | Refills: 2 | Status: SHIPPED | OUTPATIENT
Start: 2018-03-08 | End: 2019-01-15

## 2018-03-12 DIAGNOSIS — E78.5 HYPERLIPIDEMIA LDL GOAL <130: ICD-10-CM

## 2018-03-13 RX ORDER — ATORVASTATIN CALCIUM 80 MG/1
TABLET, FILM COATED ORAL
Qty: 90 TABLET | Refills: 3 | OUTPATIENT
Start: 2018-03-13

## 2018-05-31 DIAGNOSIS — K29.70 GASTRITIS: ICD-10-CM

## 2018-05-31 DIAGNOSIS — R10.13 EPIGASTRIC PAIN: ICD-10-CM

## 2018-06-18 DIAGNOSIS — F33.1 MAJOR DEPRESSIVE DISORDER, RECURRENT EPISODE, MODERATE (H): ICD-10-CM

## 2018-06-18 NOTE — LETTER
June 20, 2018    Tad Olivas  [address] [place] [facility] [address]    Dear Tad,       We recently received a refill request for FLUoxetine (PROZAC) 40 MG capsule.  We have refilled this for a one time 30 day supply only because you are due for a:    Depression and Blood pressure office visit      Please call at your earliest convenience so that there will not be a delay with your future refills.          Thank you,   Your [facility] Team/  [phone]

## 2018-06-20 RX ORDER — FLUOXETINE 40 MG/1
CAPSULE ORAL
Qty: 30 CAPSULE | Refills: 0 | Status: SHIPPED | OUTPATIENT
Start: 2018-06-20 | End: 2018-07-19

## 2018-07-19 ENCOUNTER — OFFICE VISIT (OUTPATIENT)
Dept: FAMILY MEDICINE | Facility: CLINIC | Age: 63
End: 2018-07-19
Payer: COMMERCIAL

## 2018-07-19 VITALS
RESPIRATION RATE: 20 BRPM | HEIGHT: 71 IN | HEART RATE: 73 BPM | WEIGHT: 276 LBS | SYSTOLIC BLOOD PRESSURE: 136 MMHG | BODY MASS INDEX: 38.64 KG/M2 | DIASTOLIC BLOOD PRESSURE: 86 MMHG | OXYGEN SATURATION: 96 % | TEMPERATURE: 97.2 F

## 2018-07-19 DIAGNOSIS — E78.5 HYPERLIPIDEMIA LDL GOAL <130: ICD-10-CM

## 2018-07-19 DIAGNOSIS — I10 HYPERTENSION GOAL BP (BLOOD PRESSURE) < 140/90: ICD-10-CM

## 2018-07-19 DIAGNOSIS — F33.1 MAJOR DEPRESSIVE DISORDER, RECURRENT EPISODE, MODERATE (H): Primary | ICD-10-CM

## 2018-07-19 DIAGNOSIS — Z12.5 SPECIAL SCREENING FOR MALIGNANT NEOPLASM OF PROSTATE: ICD-10-CM

## 2018-07-19 DIAGNOSIS — M79.673 HEEL PAIN, UNSPECIFIED LATERALITY: ICD-10-CM

## 2018-07-19 DIAGNOSIS — K21.9 GASTROESOPHAGEAL REFLUX DISEASE WITHOUT ESOPHAGITIS: ICD-10-CM

## 2018-07-19 LAB
ALBUMIN SERPL-MCNC: 3.8 G/DL (ref 3.4–5)
ALP SERPL-CCNC: 79 U/L (ref 40–150)
ALT SERPL W P-5'-P-CCNC: 29 U/L (ref 0–70)
ANION GAP SERPL CALCULATED.3IONS-SCNC: 5 MMOL/L (ref 3–14)
AST SERPL W P-5'-P-CCNC: 15 U/L (ref 0–45)
BILIRUB SERPL-MCNC: 1 MG/DL (ref 0.2–1.3)
BUN SERPL-MCNC: 25 MG/DL (ref 7–30)
CALCIUM SERPL-MCNC: 8.7 MG/DL (ref 8.5–10.1)
CHLORIDE SERPL-SCNC: 108 MMOL/L (ref 94–109)
CHOLEST SERPL-MCNC: 190 MG/DL
CO2 SERPL-SCNC: 28 MMOL/L (ref 20–32)
CREAT SERPL-MCNC: 1.15 MG/DL (ref 0.66–1.25)
ERYTHROCYTE [DISTWIDTH] IN BLOOD BY AUTOMATED COUNT: 13.1 % (ref 10–15)
GFR SERPL CREATININE-BSD FRML MDRD: 64 ML/MIN/1.7M2
GLUCOSE SERPL-MCNC: 94 MG/DL (ref 70–99)
HCT VFR BLD AUTO: 44.5 % (ref 40–53)
HDLC SERPL-MCNC: 48 MG/DL
HGB BLD-MCNC: 14.8 G/DL (ref 13.3–17.7)
LDLC SERPL CALC-MCNC: 127 MG/DL
MCH RBC QN AUTO: 29.4 PG (ref 26.5–33)
MCHC RBC AUTO-ENTMCNC: 33.3 G/DL (ref 31.5–36.5)
MCV RBC AUTO: 89 FL (ref 78–100)
NONHDLC SERPL-MCNC: 142 MG/DL
PLATELET # BLD AUTO: 151 10E9/L (ref 150–450)
POTASSIUM SERPL-SCNC: 4.9 MMOL/L (ref 3.4–5.3)
PROT SERPL-MCNC: 6.8 G/DL (ref 6.8–8.8)
PSA SERPL-ACNC: 1.42 UG/L (ref 0–4)
RBC # BLD AUTO: 5.03 10E12/L (ref 4.4–5.9)
SODIUM SERPL-SCNC: 141 MMOL/L (ref 133–144)
TRIGL SERPL-MCNC: 77 MG/DL
WBC # BLD AUTO: 6.2 10E9/L (ref 4–11)

## 2018-07-19 PROCEDURE — 80061 LIPID PANEL: CPT | Performed by: FAMILY MEDICINE

## 2018-07-19 PROCEDURE — 80053 COMPREHEN METABOLIC PANEL: CPT | Performed by: FAMILY MEDICINE

## 2018-07-19 PROCEDURE — G0103 PSA SCREENING: HCPCS | Performed by: FAMILY MEDICINE

## 2018-07-19 PROCEDURE — 36415 COLL VENOUS BLD VENIPUNCTURE: CPT | Performed by: FAMILY MEDICINE

## 2018-07-19 PROCEDURE — 99214 OFFICE O/P EST MOD 30 MIN: CPT | Performed by: FAMILY MEDICINE

## 2018-07-19 PROCEDURE — 85027 COMPLETE CBC AUTOMATED: CPT | Performed by: FAMILY MEDICINE

## 2018-07-19 RX ORDER — AMLODIPINE BESYLATE 5 MG/1
5 TABLET ORAL DAILY
Qty: 90 TABLET | Refills: 1 | Status: SHIPPED | OUTPATIENT
Start: 2018-07-19 | End: 2019-02-22

## 2018-07-19 RX ORDER — FLUOXETINE 40 MG/1
CAPSULE ORAL
Qty: 90 CAPSULE | Refills: 3 | Status: SHIPPED | OUTPATIENT
Start: 2018-07-19 | End: 2021-09-22

## 2018-07-19 NOTE — MR AVS SNAPSHOT
After Visit Summary   7/19/2018    Tad Olivas    MRN: [id number]           Patient Information     Date Of Birth          1955        Visit Information        Provider Department      7/19/2018 9:40 AM Clement De Jesus MD [facility]        Today's Diagnoses     Major depressive disorder, recurrent episode, moderate (H)    -  1    Hypertension goal BP (blood pressure) < 140/90        Gastroesophageal reflux disease without esophagitis        Hyperlipidemia LDL goal <130        Special screening for malignant neoplasm of prostate        Heel pain, unspecified laterality           Follow-ups after your visit        Additional Services     PODIATRY/FOOT & ANKLE SURGERY REFERRAL       Your provider has referred you to: [facility]: [facility] [phone]   [email or web address]    Please be aware that coverage of these services is subject to the terms and limitations of your health insurance plan.  Call member services at your health plan with any benefit or coverage questions.      Please bring the following to your appointment:  >>   Any x-rays, CTs or MRIs which have been performed.  Contact the facility where they were done to arrange for  prior to your scheduled appointment.    >>   List of current medications   >>   This referral request   >>   Any documents/labs given to you for this referral                  Who to contact     If you have questions or need follow up information about today's clinic visit or your schedule please contact [facility] directly at [phone].  Normal or non-critical lab and imaging results will be communicated to you by MyChart, letter or phone within 4 business days after the clinic has received the results. If you do not hear from us within 7 days, please contact the clinic through MyChart or phone. If you have a critical or abnormal lab result, we will notify you by phone as soon as  "possible.  Submit refill requests through [employer] or call your pharmacy and they will forward the refill request to us. Please allow 3 business days for your refill to be completed.          Additional Information About Your Visit        [employer] Information     [employer] gives you secure access to your electronic health record. If you see a primary care provider, you can also send messages to your care team and make appointments. If you have questions, please call your primary care clinic.  If you do not have a primary care provider, please call [phone] and they will assist you.        Care EveryWhere ID     This is your Care EveryWhere ID. This could be used by other organizations to access your [place] medical records  [id number]        Your Vitals Were     Pulse Temperature Respirations Height Pulse Oximetry BMI (Body Mass Index)    73 97.2  F (36.2  C) (Oral) 20 5' 11\" (1.803 m) 96% 38.49 kg/m2       Blood Pressure from Last 3 Encounters:   07/19/18 136/86   10/30/17 126/84   10/27/17 138/79    Weight from Last 3 Encounters:   07/19/18 276 lb (125.2 kg)   10/30/17 277 lb 8 oz (125.9 kg)   10/27/17 274 lb (124.3 kg)              We Performed the Following     CBC with platelets     Comprehensive metabolic panel (BMP + Alb, Alk Phos, ALT, AST, Total. Bili, TP)     Lipid panel reflex to direct LDL Fasting     PAF COMPLETED     PODIATRY/FOOT & ANKLE SURGERY REFERRAL     PSA, screen          Today's Medication Changes          These changes are accurate as of 7/19/18 10:22 AM.  If you have any questions, ask your nurse or doctor.               These medicines have changed or have updated prescriptions.        Dose/Directions    ascorbic acid 500 MG tablet   Commonly known as:  VITAMIN C   This may have changed:  how much to take   Used for:  Frequent UTI, Preventative health care        Dose:  500 mg   Take 1 tablet by mouth daily.   Quantity:  100 tablet   Refills:  3            Where to get your medicines    "   These medications were sent to [id number] - [place] - [address]  [address] [address]     Phone:  [phone]     amLODIPine 5 MG tablet    FLUoxetine 40 MG capsule                Primary Care Provider Office Phone # Fax #    Clement De Jesus [phone] [address] [facility] [address]        Equal Access to Services     [facility] Hospital[facility]ULSHAN : Hadii aad ku hadasho Soomaali, waaxda luqadaha, qaybta kaalmada adeegyada, waxay idiin hayaan adeeg kharash la'benn . So [facility] [phone].    ATENCIÓN: Si habla español, tiene a greer disposición servicios gratuitos de asistencia lingüística. [facility] [phone].    We comply with applicable federal civil rights laws and [place] laws. We do not discriminate on the basis of race, color, national origin, age, disability, sex, sexual orientation, or gender identity.            Thank you!     Thank you for choosing [facility]  for your care. Our goal is always to provide you with excellent care. Hearing back from our patients is one way we can continue to improve our services. Please take a few minutes to complete the written survey that you may receive in the mail after your visit with us. Thank you!             Your Updated Medication List - Protect others around you: Learn how to safely use, store and throw away your medicines at www.disposemymeds.org.          This list is accurate as of 7/19/18 10:22 AM.  Always use your most recent med list.                   Brand Name Dispense Instructions for use Diagnosis    ALEVE PO      Take 220 mg by mouth daily        amLODIPine 5 MG tablet    NORVASC    90 tablet    Take 1 tablet (5 mg) by mouth daily For blood pressure Pharmacy ok to hold prescription until due    Hypertension goal BP (blood pressure) < 140/90       ascorbic acid 500 MG tablet    VITAMIN C    100 tablet    Take 1 tablet by mouth daily.    Frequent UTI, Preventative health care        aspirin 81 MG tablet      Take 81 mg by mouth daily        atorvastatin 80 MG tablet    LIPITOR    90 tablet    TAKE ONE TABLET BY MOUTH ONCE DAILY FOR CHOLESTEROL    Hyperlipidemia LDL goal <130       finasteride 5 MG tablet    PROSCAR    90 tablet    Take 1 tablet (5 mg) by mouth daily    Enlarged prostate       FLUoxetine 40 MG capsule    PROzac    90 capsule    TAKE ONE CAPSULE BY MOUTH ONCE DAILY    Major depressive disorder, recurrent episode, moderate (H)       glucosamine-chondroitinoitin Caps     90 capsule    Take 1 each by mouth daily.    Elbow pain       Multiple vitamin Tabs     90 tablet    Take 1 tablet by mouth daily.    Preventative health care       omeprazole 20 MG CR capsule    priLOSEC    45 capsule    TAKE 1 CAPSULE BY MOUTH EVERY OTHER DAY 30 TO 60 MINUTES BEFORE A MEAL TO PROTECT STOMACH AND HEARTBURN    Epigastric pain, Gastritis       tamsulosin 0.4 MG capsule    FLOMAX    180 capsule    TAKE TWO CAPSULES BY MOUTH EVERY DAY FOR URINE FLOW    Urethritis       VITAMIN D3 PO      Take 2,000 Units by mouth daily

## 2018-07-19 NOTE — LETTER
My Depression Action Plan  Name: Tad Olivas   Date of Birth 1955  Date: 7/19/2018    My doctor: Clement De Jesus   My clinic: [facility]  [id number] [place] [id number] [phone]          GREEN    ZONE   Good Control    What it looks like:     Things are going generally well. You have normal up s and down s. You may even feel depressed from time to time, but bad moods usually last less than a day.   What you need to do:  1. Continue to care for yourself (see self care plan)  2. Check your depression survival kit and update it as needed  3. Follow your physician s recommendations including any medication.  4. Do not stop taking medication unless you consult with your physician first.           YELLOW         ZONE Getting Worse    What it looks like:     Depression is starting to interfere with your life.     It may be hard to get out of bed; you may be starting to isolate yourself from others.    Symptoms of depression are starting to last most all day and this has happened for several days.     You may have suicidal thoughts but they are not constant.   What you need to do:     1. Call your care team, your response to treatment will improve if you keep your care team informed of your progress. Yellow periods are signs an adjustment may need to be made.     2. Continue your self-care, even if you have to fake it!    3. Talk to someone in your support network    4. Open up your depression survival kit           RED    ZONE Medical Alert - Get Help    What it looks like:     Depression is seriously interfering with your life.     You may experience these or other symptoms: You can t get out of bed most days, can t work or engage in other necessary activities, you have trouble taking care of basic hygiene, or basic responsibilities, thoughts of suicide or death that will not go away, self-injurious behavior.     What you need to do:  1. Call your care team and  request a same-day appointment. If they are not available (weekends or after hours) call your local crisis line, emergency room or 911.            Depression Self Care Plan / Survival Kit    Self-Care for Depression  Here s the deal. Your body and mind are really not as separate as most people think.  What you do and think affects how you feel and how you feel influences what you do and think. This means if you do things that people who feel good do, it will help you feel better.  Sometimes this is all it takes.  There is also a place for medication and therapy depending on how severe your depression is, so be sure to consult with your medical provider and/ or Behavioral Health Consultant if your symptoms are worsening or not improving.     In order to better manage my stress, I will:    Exercise  Get some form of exercise, every day. This will help reduce pain and release endorphins, the  feel good  chemicals in your brain. This is almost as good as taking antidepressants!  This is not the same as joining a gym and then never going! (they count on that by the way ) It can be as simple as just going for a walk or doing some gardening, anything that will get you moving.      Hygiene   Maintain good hygiene (Get out of bed in the morning, Make your bed, Brush your teeth, Take a shower, and Get dressed like you were going to work, even if you are unemployed).  If your clothes don't fit try to get ones that do.    Diet  I will strive to eat foods that are good for me, drink plenty of water, and avoid excessive sugar, caffeine, alcohol, and other mood-altering substances.  Some foods that are helpful in depression are: complex carbohydrates, B vitamins, flaxseed, fish or fish oil, fresh fruits and vegetables.    Psychotherapy  I agree to participate in Individual Therapy (if recommended).    Medication  If prescribed medications, I agree to take them.  Missing doses can result in serious side effects.  I understand that  drinking alcohol, or other illicit drug use, may cause potential side effects.  I will not stop my medication abruptly without first discussing it with my provider.    Staying Connected With Others  I will stay in touch with my friends, family members, and my primary care provider/team.    Use your imagination  Be creative.  We all have a creative side; it doesn t matter if it s oil painting, sand castles, or mud pies! This will also kick up the endorphins.    Witness Beauty  (AKA stop and smell the roses) Take a look outside, even in mid-winter. Notice colors, textures. Watch the squirrels and birds.     Service to others  Be of service to others.  There is always someone else in need.  By helping others we can  get out of ourselves  and remember the really important things.  This also provides opportunities for practicing all the other parts of the program.    Humor  Laugh and be silly!  Adjust your TV habits for less news and crime-drama and more comedy.    Control your stress  Try breathing deep, massage therapy, biofeedback, and meditation. Find time to relax each day.     My support system    Clinic Contact:  Phone number:    Contact 1:  Phone number:    Contact 2:  Phone number:    Jehovah's witness/:  Phone number:    Therapist:  Phone number:    Local crisis center:    Phone number:    Other community support:  Phone number:

## 2018-07-19 NOTE — NURSING NOTE
"Chief Complaint   Patient presents with     Hypertension     Derm Problem     Health Maintenance     phq9/dap       Initial /86  Pulse 73  Temp 97.2  F (36.2  C) (Oral)  Resp 20  Ht 5' 11\" (1.803 m)  Wt 276 lb (125.2 kg)  SpO2 96%  BMI 38.49 kg/m2 Estimated body mass index is 38.49 kg/(m^2) as calculated from the following:    Height as of this encounter: 5' 11\" (1.803 m).    Weight as of this encounter: 276 lb (125.2 kg).    Naomy Riley, MISBAH    "

## 2018-11-06 DIAGNOSIS — N34.2 URETHRITIS: ICD-10-CM

## 2018-11-06 RX ORDER — TAMSULOSIN HYDROCHLORIDE 0.4 MG/1
CAPSULE ORAL
Qty: 180 CAPSULE | Refills: 0 | Status: SHIPPED | OUTPATIENT
Start: 2018-11-06 | End: 2019-04-30

## 2018-12-10 DIAGNOSIS — K29.70 GASTRITIS: ICD-10-CM

## 2018-12-10 DIAGNOSIS — R10.13 EPIGASTRIC PAIN: ICD-10-CM

## 2019-01-15 DIAGNOSIS — E78.5 HYPERLIPIDEMIA LDL GOAL <130: ICD-10-CM

## 2019-01-16 RX ORDER — ATORVASTATIN CALCIUM 80 MG/1
TABLET, FILM COATED ORAL
Qty: 90 TABLET | Refills: 1 | Status: SHIPPED | OUTPATIENT
Start: 2019-01-16 | End: 2019-07-24

## 2019-02-22 DIAGNOSIS — N40.0 ENLARGED PROSTATE: ICD-10-CM

## 2019-02-22 DIAGNOSIS — I10 HYPERTENSION GOAL BP (BLOOD PRESSURE) < 140/90: ICD-10-CM

## 2019-02-22 RX ORDER — AMLODIPINE BESYLATE 5 MG/1
TABLET ORAL
Qty: 90 TABLET | Refills: 0 | Status: SHIPPED | OUTPATIENT
Start: 2019-02-22 | End: 2019-04-30

## 2019-02-25 RX ORDER — FINASTERIDE 5 MG/1
1 TABLET, FILM COATED ORAL DAILY
Qty: 30 TABLET | Refills: 0 | Status: SHIPPED | OUTPATIENT
Start: 2019-02-25 | End: 2019-05-29

## 2019-02-25 NOTE — TELEPHONE ENCOUNTER
Last Clinic Visit: 10/30/2017  [facility]  Proscar RF 30 day earline  Scheduling has been notified to contact the pt for appointment.

## 2019-04-29 DIAGNOSIS — N40.0 ENLARGED PROSTATE: ICD-10-CM

## 2019-04-30 ENCOUNTER — TELEPHONE (OUTPATIENT)
Dept: FAMILY MEDICINE | Facility: CLINIC | Age: 64
End: 2019-04-30

## 2019-04-30 DIAGNOSIS — K29.70 GASTRITIS: ICD-10-CM

## 2019-04-30 DIAGNOSIS — N34.2 URETHRITIS: ICD-10-CM

## 2019-04-30 DIAGNOSIS — R10.13 EPIGASTRIC PAIN: ICD-10-CM

## 2019-04-30 DIAGNOSIS — I10 HYPERTENSION GOAL BP (BLOOD PRESSURE) < 140/90: ICD-10-CM

## 2019-04-30 RX ORDER — TAMSULOSIN HYDROCHLORIDE 0.4 MG/1
CAPSULE ORAL
Qty: 180 CAPSULE | Refills: 0 | Status: SHIPPED | OUTPATIENT
Start: 2019-04-30 | End: 2019-05-29

## 2019-04-30 RX ORDER — AMLODIPINE BESYLATE 5 MG/1
TABLET ORAL
Qty: 90 TABLET | Refills: 0 | Status: SHIPPED | OUTPATIENT
Start: 2019-04-30 | End: 2019-08-06

## 2019-04-30 NOTE — TELEPHONE ENCOUNTER
Reason for Call:  Medication or medication refill:    Do you use a [facility]?  Name of the pharmacy and phone number for the current request:  [facility]    Name of the medication requested: finasteride (PROSCAR) 5 MG tablet, amLODIPine (NORVASC) 5 MG tablet, omeprazole (PRILOSEC) 20 MG DR capsule, tamsulosin (FLOMAX) 0.4 MG capsule    Other request: patient has  A physical appointment for 6/4/2019.  Patient will be out of the above medications.    Can we leave a detailed message on this number? YES    Phone number patient can be reached at: Home number on file [phone] (home)    Best Time: any    Call taken on 4/30/2019 at 3:34 PM by Katy Hanley

## 2019-04-30 NOTE — TELEPHONE ENCOUNTER
Patient informed of refills sent to pharmacy. Refill for Finasteride was sent to his urologist to address    Anna Marie FLAHERTY, RN, CPN

## 2019-05-01 ENCOUNTER — TELEPHONE (OUTPATIENT)
Dept: UROLOGY | Facility: CLINIC | Age: 64
End: 2019-05-01

## 2019-05-01 DIAGNOSIS — N40.0 ENLARGED PROSTATE: Primary | ICD-10-CM

## 2019-05-01 DIAGNOSIS — Z87.448 HISTORY OF URETHRAL STRICTURE: ICD-10-CM

## 2019-05-01 RX ORDER — FINASTERIDE 5 MG/1
TABLET, FILM COATED ORAL
Qty: 30 TABLET | Refills: 0 | OUTPATIENT
Start: 2019-05-01

## 2019-05-01 RX ORDER — FINASTERIDE 5 MG/1
5 TABLET, FILM COATED ORAL DAILY
Qty: 30 TABLET | Refills: 0 | Status: SHIPPED | OUTPATIENT
Start: 2019-05-01 | End: 2019-05-29

## 2019-05-01 NOTE — TELEPHONE ENCOUNTER
Refill request for Proscar denied because pt has not followed up and was last seen 10/2017.    Pt called and scheduled for UDS.

## 2019-05-01 NOTE — TELEPHONE ENCOUNTER
finasteride (PROSCAR) 5 MG     Last Written Prescription Date: 2/25/19  Last Fill Quantity: 30,   # refills: 0  Last Office Visit : 10/30/17  Future Office visit:  NONE    Routing refill request to provider for review/approval because:  Please call clinic to schedule follow up appointment.  / NO PENDING APPT

## 2019-05-14 ENCOUNTER — PRE VISIT (OUTPATIENT)
Dept: UROLOGY | Facility: CLINIC | Age: 64
End: 2019-05-14

## 2019-05-22 ENCOUNTER — DOCUMENTATION ONLY (OUTPATIENT)
Dept: LAB | Facility: CLINIC | Age: 64
End: 2019-05-22

## 2019-05-22 DIAGNOSIS — I10 HYPERTENSION GOAL BP (BLOOD PRESSURE) < 140/90: ICD-10-CM

## 2019-05-22 DIAGNOSIS — R10.13 EPIGASTRIC PAIN: ICD-10-CM

## 2019-05-22 DIAGNOSIS — K29.70 GASTRITIS: ICD-10-CM

## 2019-05-22 DIAGNOSIS — Z00.00 ROUTINE HISTORY AND PHYSICAL EXAMINATION OF ADULT: Primary | ICD-10-CM

## 2019-05-22 DIAGNOSIS — Z11.59 NEED FOR HEPATITIS C SCREENING TEST: ICD-10-CM

## 2019-05-22 DIAGNOSIS — E78.5 HYPERLIPIDEMIA LDL GOAL <130: ICD-10-CM

## 2019-05-22 DIAGNOSIS — Z12.5 SCREENING FOR PROSTATE CANCER: ICD-10-CM

## 2019-05-29 ENCOUNTER — OFFICE VISIT (OUTPATIENT)
Dept: UROLOGY | Facility: CLINIC | Age: 64
End: 2019-05-29
Payer: COMMERCIAL

## 2019-05-29 ENCOUNTER — ANCILLARY PROCEDURE (OUTPATIENT)
Dept: RADIOLOGY | Facility: AMBULATORY SURGERY CENTER | Age: 64
End: 2019-05-29
Attending: PHYSICIAN ASSISTANT
Payer: COMMERCIAL

## 2019-05-29 VITALS
WEIGHT: 265 LBS | HEART RATE: 86 BPM | HEIGHT: 71 IN | DIASTOLIC BLOOD PRESSURE: 73 MMHG | SYSTOLIC BLOOD PRESSURE: 119 MMHG | BODY MASS INDEX: 37.1 KG/M2

## 2019-05-29 DIAGNOSIS — R39.198 SLOW URINARY STREAM: Primary | ICD-10-CM

## 2019-05-29 DIAGNOSIS — Z11.59 NEED FOR HEPATITIS C SCREENING TEST: ICD-10-CM

## 2019-05-29 DIAGNOSIS — Z00.00 ROUTINE HISTORY AND PHYSICAL EXAMINATION OF ADULT: ICD-10-CM

## 2019-05-29 DIAGNOSIS — Z87.448 HISTORY OF URETHRAL STRICTURE: ICD-10-CM

## 2019-05-29 DIAGNOSIS — N40.0 ENLARGED PROSTATE: ICD-10-CM

## 2019-05-29 DIAGNOSIS — E78.5 HYPERLIPIDEMIA LDL GOAL <130: ICD-10-CM

## 2019-05-29 DIAGNOSIS — N52.9 ERECTILE DYSFUNCTION, UNSPECIFIED ERECTILE DYSFUNCTION TYPE: ICD-10-CM

## 2019-05-29 DIAGNOSIS — I10 HYPERTENSION GOAL BP (BLOOD PRESSURE) < 140/90: ICD-10-CM

## 2019-05-29 LAB
ALBUMIN SERPL-MCNC: 4.2 G/DL (ref 3.4–5)
ALP SERPL-CCNC: 105 U/L (ref 40–150)
ALT SERPL W P-5'-P-CCNC: 36 U/L (ref 0–70)
ANION GAP SERPL CALCULATED.3IONS-SCNC: 7 MMOL/L (ref 3–14)
APPEARANCE UR: CLEAR
AST SERPL W P-5'-P-CCNC: 17 U/L (ref 0–45)
BILIRUB SERPL-MCNC: 1 MG/DL (ref 0.2–1.3)
BILIRUB UR QL: NORMAL
BUN SERPL-MCNC: 26 MG/DL (ref 7–30)
CALCIUM SERPL-MCNC: 9.3 MG/DL (ref 8.5–10.1)
CHLORIDE SERPL-SCNC: 110 MMOL/L (ref 94–109)
CHOLEST SERPL-MCNC: 203 MG/DL
CO2 SERPL-SCNC: 25 MMOL/L (ref 20–32)
COLOR UR: YELLOW
CREAT SERPL-MCNC: 1.09 MG/DL (ref 0.66–1.25)
GFR SERPL CREATININE-BSD FRML MDRD: 71 ML/MIN/{1.73_M2}
GLUCOSE SERPL-MCNC: 108 MG/DL (ref 70–99)
GLUCOSE URINE: NORMAL MG/DL
HCV AB SERPL QL IA: NONREACTIVE
HDLC SERPL-MCNC: 51 MG/DL
HGB UR QL: NORMAL
KETONES UR QL: 5 MG/DL
LDLC SERPL CALC-MCNC: 136 MG/DL
LEUKOCYTE ESTERASE URINE: NORMAL
NITRITE UR QL STRIP: NORMAL
NONHDLC SERPL-MCNC: 152 MG/DL
PH UR STRIP: 5.5 PH (ref 5–7)
POTASSIUM SERPL-SCNC: 4.6 MMOL/L (ref 3.4–5.3)
PROT SERPL-MCNC: 6.8 G/DL (ref 6.8–8.8)
PROTEIN ALBUMIN URINE: NORMAL MG/DL
SODIUM SERPL-SCNC: 142 MMOL/L (ref 133–144)
SOURCE: NORMAL
SP GR UR STRIP: 1.02 (ref 1–1.03)
TRIGL SERPL-MCNC: 78 MG/DL
UROBILINOGEN UR QL STRIP: 1 EU/DL (ref 0.2–1)

## 2019-05-29 PROCEDURE — 80053 COMPREHEN METABOLIC PANEL: CPT | Performed by: FAMILY MEDICINE

## 2019-05-29 PROCEDURE — 80061 LIPID PANEL: CPT | Performed by: FAMILY MEDICINE

## 2019-05-29 PROCEDURE — 36415 COLL VENOUS BLD VENIPUNCTURE: CPT | Performed by: FAMILY MEDICINE

## 2019-05-29 PROCEDURE — 86803 HEPATITIS C AB TEST: CPT | Performed by: FAMILY MEDICINE

## 2019-05-29 RX ORDER — FINASTERIDE 5 MG/1
1 TABLET, FILM COATED ORAL DAILY
Qty: 90 TABLET | Refills: 3 | Status: SHIPPED | OUTPATIENT
Start: 2019-05-29 | End: 2020-05-28

## 2019-05-29 RX ORDER — TAMSULOSIN HYDROCHLORIDE 0.4 MG/1
0.8 CAPSULE ORAL DAILY
Qty: 180 CAPSULE | Refills: 3 | Status: SHIPPED | OUTPATIENT
Start: 2019-05-29 | End: 2020-07-21

## 2019-05-29 RX ORDER — SULFAMETHOXAZOLE/TRIMETHOPRIM 800-160 MG
1 TABLET ORAL ONCE
Status: COMPLETED | OUTPATIENT
Start: 2019-05-29 | End: 2019-05-29

## 2019-05-29 RX ORDER — SILDENAFIL 50 MG/1
50-100 TABLET, FILM COATED ORAL PRN
Qty: 30 TABLET | Refills: 3 | Status: SHIPPED | OUTPATIENT
Start: 2019-05-29 | End: 2020-07-21

## 2019-05-29 RX ADMIN — SULFAMETHOXAZOLE AND TRIMETHOPRIM 1 TABLET: 800; 160 TABLET ORAL at 13:26

## 2019-05-29 ASSESSMENT — MIFFLIN-ST. JEOR: SCORE: 2009.16

## 2019-05-29 ASSESSMENT — PAIN SCALES - GENERAL: PAINLEVEL: NO PAIN (0)

## 2019-05-29 NOTE — PATIENT INSTRUCTIONS
UROLOGY CLINIC VISIT PATIENT INSTRUCTIONS    Continue taking Flomax (tamsulosni) 0.8 mg once daily and finasteride 5 mg once daily. Refills x 1 year were sent to [facility] in [place].    Try sildenafil 1-2 tablets ( mg) by mouth as needed 30-60 minutes prior to sexual activity. Take the lowest dose first and increase to 2 pills only if needed. Never exceed more than 2 pills in 24 hours.     Make sure to space your tamsulosin and sildenafil doses apart by at least 4 hours. If you take them too close together, you can get low blood pressure and become lightheaded and dizzy which increases your risk for falls.     Going forward, your family medicine doctor should be able to provide refills of all of these medications. If not, follow up with us in 1 year for refills. If the sildenafil is not helping, call [phone] to make an appointment to discuss other treatment options.    If you have any issues, questions or concerns in the meantime, do not hesitate to contact us at [phone] or via [employer].     It was a pleasure meeting with you today.  Thank you for allowing me and my team the privilege of caring for you today.  YOU are the reason we are here, and I truly hope we provided you with the excellent service you deserve.  Please let us know if there is anything else we can do for you so that we can be sure you are leaving completely satisfied with your care experience.

## 2019-05-29 NOTE — PROGRESS NOTES
PREPROCEDURE DIAGNOSES:    1. Slow urinary stream  2. History of urethral stricture s/p buccal graft dorsal onlay urethroplasty    POSTPROCEDURE DIAGNOSES:  -Normal bladder capacity (325 mL) with normal filling sensations.  -Normal bladder compliance without DO/DOI.  -No SERGE.  -Fair detrusor contraction during voiding to max Pdet 13 cm H2O.  -Reduced flow rate (Qmax 8.3 mL/s) with a continuous flow curve and complete bladder emptying (final PVR 0 mL).  -No evidence for DSD or bladder outlet obstruction.  -Fluoroscopy reveals a mildly trabeculated bladder wall without diverticuli or VUR. Bladder neck is closed during filling and open during voiding with some possible narrowing noted within the prostatic urethra.    PROCEDURE:    1. Uroflowmetry.  2. Sterile urethral catheterization for measurement of postvoid residual urine volume.  3. Complex filling cystometrogram with measurement of bladder and rectal pressures.  4. Complex voiding cystometrogram with measurement of bladder and rectal pressures.  5. Electromyography of the pelvic floor during urodynamics.  6. Fluoroscopic imaging of the bladder during urodynamics, at least 3 views.    7. Interpretation of urodynamics and flouroscopic imaging.      INDICATIONS FOR PROCEDURE:  Mr. Tad Olivas is a pleasant 63 year old male with a history of urethral stricture s/p buccal graft dorsal onlay urethroplasty with Dr. Christiano Leger with persistent slow flow after the procedure. Baseline video urodynamic assessment is requested today by Dr. Leger to better characterize Mr. Tad Olivas's voiding dysfunction.      VOIDING DIARY:  Voids every 2-4 hours during the day, nocturia x 1.  Episodes of incontinence: none.  Incontinence associated with: n/a.  Total Volume Intake: 8693-3125 mL; 830-945 mL of which is coffee, the rest is water or OJ.  Total Volume Output: 1626 mL; average voided volume 200 mL, largest voided volume 415 mL.    DESCRIPTION OF PROCEDURE:  Risks,  benefits, and alternatives to urodynamics were discussed with the patient and he wished to proceed.  Urodynamics are planned to better assess the primary etiology for Mr. Olivas's urologic dysfunction.  The patient does not currently take anticholinergic medications for his bladder.  After informed consent was obtained, the patient was taken to the procedure room where uroflowmetry was performed. Findings below.     PRE-STUDY UROFLOWMETRY:  Voided volume: 73 mL.  Maximum flow rate: 9.2 mL/sec.  Average flow rate: 5.8 mL/sec.  Character of the curve: low amplitude bell-shape.  Postvoid residual by catheter: 10 mL.  Pretest urine dipstick was negative for leukocytes and nitrites.    Next a 7F double-lumen urodynamics catheter was inserted into the bladder under sterile technique via urethra.  A 7F abdominal manometry catheter was placed in the rectum.  EMG pads were placed on both sides of the anal verge.  The bladder was filled with 200 mL of Cystografin at 50 mL/minute and serial pressures were recorded.  With coughing there was an appropriate rise in vesical and abdominal pressures with no change in detrusor pressure, confirming good study catheter placement.    DURING THE FILLING PHASE:  First sensation: 63 mL.  First Desire: 127 mL.  Strong Desire: 178 mL.  Maximum Capacity: 274 mL.    Uninhibited detrusor contractions: no significant DO/DOI.  Compliance: normal. PDet=6.5 cmH20 at capacity. Compliance ratio of 42.  Continence: no SERGE or DOI.  EMG: concordant during filling.    DURING THE VOIDING PHASE:  Maximum detrusor contraction with void: 13 cm of H2O pressure.  Voided volume: 326 mL.  Maximum flow rate: 8.3 mL/sec.  Average flow rate: 4.7 mL/sec.  Postvoid Residual: 0 mL.  EMG activity: quiet.  Character of voiding curve: bell-shape with plateau.  BOOI: -4.1 (suggesting no obstruction - see key below)  [obstructed (DEMPSEY index [BOOI] ? 40); equivocal (no definite   obstruction; BOOI 20-40); and no obstruction  (BOOI ? 20)]    FLUOROSCOPIC IMAGING OF THE BLADDER DURING URODYNAMICS:  Please note, image numbers on UDS tracings correlate with iSite series numbers on PACS images. Fluoroscopy during today's procedure demonstrated a mildly trabeculated bladder wall without diverticulae or cellules.  No vesicoureteral reflux was observed.  The bladder neck was closed during filling and open during voiding.  After voiding to completion, all catheters were removed and the patient was brought back into the consultation room to further discuss today's study results.      ASSESSMENT/PLAN:  Mr. Tad Olivas is a pleasant 63 year old male with a history of urethral stricture s/p urethoplasty with persistent slow flow who demonstrated the following findings today on urodynamic evaluation:    -Normal bladder capacity (325 mL) with normal filling sensations.  -Normal bladder compliance without DO/DOI.  -No SERGE.  -Fair detrusor contraction during voiding to max Pdet 13 cm H2O.  -Reduced flow rate (Qmax 8.3 mL/s) with a continuous flow curve and complete bladder emptying (final PVR 0 mL).  -No evidence for DSD or bladder outlet obstruction.  -Fluoroscopy reveals a mildly trabeculated bladder wall without diverticuli or VUR. Bladder neck is closed during filling and open during voiding with some possible narrowing noted within the prostatic urethra.    We discussed his study results in detail today. Suspect slow flow is secondary to hypocontractile detrusor function rather than outlet obstruction. Importantly, he does empty well. We discussed management options including continued observation versus CIC. He is not overly bothered by his symptoms and elects to observe.  -Patient requests refills of tamsulosin 0.8 mg once daily and finasteride 5 mg once daily which he has been taking for some time. Refills provided x 1 year. Can continue to be filled by PCP going forward.   -At the conclusion of today's appointment, patient also asks about a  prescription for sildenafil. States that he has had issues with erectile and ejaculatory dysfunction since urethroplasty surgery. He has never tried PDE-5 inhibitors previously.  -Will write Rx for sildenafil 50 mg tablets #30, 3 refills, sent to [facility] per patient request. Dosing instructions and potential side effects discussed in detail. Instructed not to take sildeanfil within 4 hours of taking tamsulosin to lessen risk for symptomatic hypotension. If this medication is effective, PCP can continue to fill. If not effective, return to clinic to consider ICI versus other therapy.     - A single Bactrim antibiotic was provided for UTI prophylaxis following completion of today's study per department protocol.  The risk of UTI with VUDS is low at ~2.5-3%.      Thank you for allowing me to participate in the care of Mr. Tad Olivas and please don't hesitate to contact me with any questions or concerns.      Janet Dodd PA-C  Urology Physician Assistant

## 2019-05-29 NOTE — NURSING NOTE
Invasive Procedure Safety Checklist:    Procedure: Urodynamics Study    Action: Complete sections and checkboxes as appropriate.    Pre-procedure:  1. Patient ID Verified with 2 identifiers (Jayde and  or MRN) : YES    2. Procedure and site verified with patient/designee (when able) : YES    3. Accurate consent documentation in medical record : YES    4. H&P (or appropriate assessment) documented in medical record : NO  H&P must be up to 30 days prior to procedure an updated within 24 hours of                 Procedure as applicable.     5. Relevant diagnostic and radiology test results appropriately labeled and displayed as applicable : NO    6. Blood products, implants, devices, and/or special equipment available for the procedure as applicable : NO    7. Procedure site(s) marked with provider initials [Exclusions: ] : NO    8. Marking not required. Reason : Yes  Procedure does not require site marking    Time Out:     Time-Out performed immediately prior to starting procedure, including verbal and active participation of all team members addressing: YES    1. Correct patient identity.  2. Confirmed that the correct side and site are marked.  3. An accurate procedure to be done.  4. Agreement on the procedure to be done.  5. Correct patient position.  6. Relevant images and results are properly labeled and appropriately displayed.  7. The need to administer antibiotics or fluids for irrigation purposes during the procedure as applicable.  8. Safety precautions based on patient history or medication use.    During Procedure: Verification of correct person, site, and procedure occurs any time the responsibility for care of the patient is transferred to another member of the care team.    MICKEY Weber

## 2019-05-29 NOTE — LETTER
5/29/2019       RE: Tad Olivas  [address] [facility] [address]     Dear Colleague,    Thank you for referring your patient, Tad Olivas, to the [facility] UROLOGY AND INST FOR PROSTATE AND UROLOGIC CANCERS at [facility]. Please see a copy of my visit note below.    PREPROCEDURE DIAGNOSES:    1. Slow urinary stream  2. History of urethral stricture s/p buccal graft dorsal onlay urethroplasty    POSTPROCEDURE DIAGNOSES:  -Normal bladder capacity (325 mL) with normal filling sensations.  -Normal bladder compliance without DO/DOI.  -No SERGE.  -Fair detrusor contraction during voiding to max Pdet 13 cm H2O.  -Reduced flow rate (Qmax 8.3 mL/s) with a continuous flow curve and complete bladder emptying (final PVR 0 mL).  -No evidence for DSD or bladder outlet obstruction.  -Fluoroscopy reveals a mildly trabeculated bladder wall without diverticuli or VUR. Bladder neck is closed during filling and open during voiding with some possible narrowing noted within the prostatic urethra.    PROCEDURE:    1. Uroflowmetry.  2. Sterile urethral catheterization for measurement of postvoid residual urine volume.  3. Complex filling cystometrogram with measurement of bladder and rectal pressures.  4. Complex voiding cystometrogram with measurement of bladder and rectal pressures.  5. Electromyography of the pelvic floor during urodynamics.  6. Fluoroscopic imaging of the bladder during urodynamics, at least 3 views.    7. Interpretation of urodynamics and flouroscopic imaging.      INDICATIONS FOR PROCEDURE:  Mr. Tad Olivas is a pleasant 63 year old male with a history of urethral stricture s/p buccal graft dorsal onlay urethroplasty with Dr. Christiano Leger with persistent slow flow after the procedure. Baseline video urodynamic assessment is requested today by Dr. Leger to better characterize Mr. Tad Olivas's voiding dysfunction.      VOIDING DIARY:  Voids every 2-4 hours  during the day, nocturia x 1.  Episodes of incontinence: none.  Incontinence associated with: n/a.  Total Volume Intake: 7074-0603 mL; 830-945 mL of which is coffee, the rest is water or OJ.  Total Volume Output: 1626 mL; average voided volume 200 mL, largest voided volume 415 mL.    DESCRIPTION OF PROCEDURE:  Risks, benefits, and alternatives to urodynamics were discussed with the patient and he wished to proceed.  Urodynamics are planned to better assess the primary etiology for Mr. Olivas's urologic dysfunction.  The patient does not currently take anticholinergic medications for his bladder.  After informed consent was obtained, the patient was taken to the procedure room where uroflowmetry was performed. Findings below.     PRE-STUDY UROFLOWMETRY:  Voided volume: 73 mL.  Maximum flow rate: 9.2 mL/sec.  Average flow rate: 5.8 mL/sec.  Character of the curve: low amplitude bell-shape.  Postvoid residual by catheter: 10 mL.  Pretest urine dipstick was negative for leukocytes and nitrites.    Next a 7F double-lumen urodynamics catheter was inserted into the bladder under sterile technique via urethra.  A 7F abdominal manometry catheter was placed in the rectum.  EMG pads were placed on both sides of the anal verge.  The bladder was filled with 200 mL of Cystografin at 50 mL/minute and serial pressures were recorded.  With coughing there was an appropriate rise in vesical and abdominal pressures with no change in detrusor pressure, confirming good study catheter placement.    DURING THE FILLING PHASE:  First sensation: 63 mL.  First Desire: 127 mL.  Strong Desire: 178 mL.  Maximum Capacity: 274 mL.    Uninhibited detrusor contractions: no significant DO/DOI.  Compliance: normal. PDet=6.5 cmH20 at capacity. Compliance ratio of 42.  Continence: no SERGE or DOI.  EMG: concordant during filling.    DURING THE VOIDING PHASE:  Maximum detrusor contraction with void: 13 cm of H2O pressure.  Voided volume: 326 mL.  Maximum  flow rate: 8.3 mL/sec.  Average flow rate: 4.7 mL/sec.  Postvoid Residual: 0 mL.  EMG activity: quiet.  Character of voiding curve: bell-shape with plateau.  BOOI: -4.1 (suggesting no obstruction - see key below)  [obstructed (DEMPSEY index [BOOI] ? 40); equivocal (no definite   obstruction; BOOI 20-40); and no obstruction (BOOI ? 20)]    FLUOROSCOPIC IMAGING OF THE BLADDER DURING URODYNAMICS:  Please note, image numbers on UDS tracings correlate with iSite series numbers on PACS images. Fluoroscopy during today's procedure demonstrated a mildly trabeculated bladder wall without diverticulae or cellules.  No vesicoureteral reflux was observed.  The bladder neck was closed during filling and open during voiding.  After voiding to completion, all catheters were removed and the patient was brought back into the consultation room to further discuss today's study results.      ASSESSMENT/PLAN:  Mr. Tad Olivas is a pleasant 63 year old male with a history of urethral stricture s/p urethoplasty with persistent slow flow who demonstrated the following findings today on urodynamic evaluation:    -Normal bladder capacity (325 mL) with normal filling sensations.  -Normal bladder compliance without DO/DOI.  -No SERGE.  -Fair detrusor contraction during voiding to max Pdet 13 cm H2O.  -Reduced flow rate (Qmax 8.3 mL/s) with a continuous flow curve and complete bladder emptying (final PVR 0 mL).  -No evidence for DSD or bladder outlet obstruction.  -Fluoroscopy reveals a mildly trabeculated bladder wall without diverticuli or VUR. Bladder neck is closed during filling and open during voiding with some possible narrowing noted within the prostatic urethra.    We discussed his study results in detail today. Suspect slow flow is secondary to hypocontractile detrusor function rather than outlet obstruction. Importantly, he does empty well. We discussed management options including continued observation versus CIC. He is not overly  bothered by his symptoms and elects to observe.  -Patient requests refills of tamsulosin 0.8 mg once daily and finasteride 5 mg once daily which he has been taking for some time. Refills provided x 1 year. Can continue to be filled by PCP going forward.   -At the conclusion of today's appointment, patient also asks about a prescription for sildenafil. States that he has had issues with erectile and ejaculatory dysfunction since urethroplasty surgery. He has never tried PDE-5 inhibitors previously.  -Will write Rx for sildenafil 50 mg tablets #30, 3 refills, sent to [facility] per patient request. Dosing instructions and potential side effects discussed in detail. Instructed not to take sildeanfil within 4 hours of taking tamsulosin to lessen risk for symptomatic hypotension. If this medication is effective, PCP can continue to fill. If not effective, return to clinic to consider ICI versus other therapy.     - A single Bactrim antibiotic was provided for UTI prophylaxis following completion of today's study per department protocol.  The risk of UTI with VUDS is low at ~2.5-3%.      Thank you for allowing me to participate in the care of Mr. Tad Olivas and please don't hesitate to contact me with any questions or concerns.      Janet Dodd PA-C  Urology Physician Assistant

## 2019-05-29 NOTE — NURSING NOTE
The following medication was given:     MEDICATION:  Bactrim DS  ROUTE: PO  SITE: Medication was given orally   DOSE: 800 mg/160 mg  LOT #: [id number]  : Major  EXPIRATION DATE: 09/2019  NDC#: 3281-8807-18   Was there drug waste? No      Ericka Mujica CMA  May 29, 2019

## 2019-06-04 ENCOUNTER — OFFICE VISIT (OUTPATIENT)
Dept: FAMILY MEDICINE | Facility: CLINIC | Age: 64
End: 2019-06-04
Payer: COMMERCIAL

## 2019-06-04 VITALS
TEMPERATURE: 98.1 F | HEIGHT: 71 IN | HEART RATE: 77 BPM | OXYGEN SATURATION: 95 % | SYSTOLIC BLOOD PRESSURE: 150 MMHG | DIASTOLIC BLOOD PRESSURE: 94 MMHG | BODY MASS INDEX: 39.34 KG/M2 | WEIGHT: 281 LBS | RESPIRATION RATE: 20 BRPM

## 2019-06-04 DIAGNOSIS — I10 HYPERTENSION GOAL BP (BLOOD PRESSURE) < 140/90: ICD-10-CM

## 2019-06-04 DIAGNOSIS — F33.1 MAJOR DEPRESSIVE DISORDER, RECURRENT EPISODE, MODERATE (H): Primary | ICD-10-CM

## 2019-06-04 DIAGNOSIS — Z23 NEED FOR VACCINATION: ICD-10-CM

## 2019-06-04 DIAGNOSIS — R06.02 SOB (SHORTNESS OF BREATH): ICD-10-CM

## 2019-06-04 DIAGNOSIS — L98.9 SKIN LESION: ICD-10-CM

## 2019-06-04 PROCEDURE — 90670 PCV13 VACCINE IM: CPT | Performed by: FAMILY MEDICINE

## 2019-06-04 PROCEDURE — G0009 ADMIN PNEUMOCOCCAL VACCINE: HCPCS | Mod: 59 | Performed by: FAMILY MEDICINE

## 2019-06-04 PROCEDURE — 90715 TDAP VACCINE 7 YRS/> IM: CPT | Performed by: FAMILY MEDICINE

## 2019-06-04 PROCEDURE — 99214 OFFICE O/P EST MOD 30 MIN: CPT | Mod: 25 | Performed by: FAMILY MEDICINE

## 2019-06-04 PROCEDURE — 90471 IMMUNIZATION ADMIN: CPT | Performed by: FAMILY MEDICINE

## 2019-06-04 PROCEDURE — 17110 DESTRUCTION B9 LES UP TO 14: CPT | Performed by: FAMILY MEDICINE

## 2019-06-04 RX ORDER — BUPROPION HYDROCHLORIDE 100 MG/1
TABLET, EXTENDED RELEASE ORAL
Qty: 30 TABLET | Refills: 5 | Status: SHIPPED | OUTPATIENT
Start: 2019-06-04 | End: 2019-08-06

## 2019-06-04 ASSESSMENT — ANXIETY QUESTIONNAIRES
3. WORRYING TOO MUCH ABOUT DIFFERENT THINGS: MORE THAN HALF THE DAYS
2. NOT BEING ABLE TO STOP OR CONTROL WORRYING: MORE THAN HALF THE DAYS
IF YOU CHECKED OFF ANY PROBLEMS ON THIS QUESTIONNAIRE, HOW DIFFICULT HAVE THESE PROBLEMS MADE IT FOR YOU TO DO YOUR WORK, TAKE CARE OF THINGS AT HOME, OR GET ALONG WITH OTHER PEOPLE: SOMEWHAT DIFFICULT
GAD7 TOTAL SCORE: 9
1. FEELING NERVOUS, ANXIOUS, OR ON EDGE: SEVERAL DAYS
5. BEING SO RESTLESS THAT IT IS HARD TO SIT STILL: NOT AT ALL
7. FEELING AFRAID AS IF SOMETHING AWFUL MIGHT HAPPEN: SEVERAL DAYS
6. BECOMING EASILY ANNOYED OR IRRITABLE: MORE THAN HALF THE DAYS

## 2019-06-04 ASSESSMENT — MIFFLIN-ST. JEOR: SCORE: 2081.74

## 2019-06-04 ASSESSMENT — PATIENT HEALTH QUESTIONNAIRE - PHQ9
SUM OF ALL RESPONSES TO PHQ QUESTIONS 1-9: 14
5. POOR APPETITE OR OVEREATING: SEVERAL DAYS

## 2019-06-04 NOTE — NURSING NOTE
"Chief Complaint   Patient presents with     Medicare Visit     ph9/tdap/ prevnar 13        Initial BP (!) 150/94   Pulse 77   Temp 98.1  F (36.7  C) (Oral)   Resp 20   Ht 1.803 m (5' 11\")   Wt 127.5 kg (281 lb)   SpO2 95%   BMI 39.19 kg/m   Estimated body mass index is 39.19 kg/m  as calculated from the following:    Height as of this encounter: 1.803 m (5' 11\").    Weight as of this encounter: 127.5 kg (281 lb).    Naomy Riley [facility]    "

## 2019-06-04 NOTE — PROGRESS NOTES
"SUBJECTIVE:  Tad Olivas, a 63 year old male scheduled an appointment to discuss the following issues:  Blood pressure/ cholesterol, gerd and depression.  No outside blood pressure reading. Needs more exercise but has exercise equipment. Went to [place] 10 day in March and lots of milk.   Lack of energy/ weight gain. Interested in wellbutrin trial. Lifetime depression - worse past 6months. Lack of motivation. No therapist but might be interested in seeing therapist.   Some shortness of breath. No history asthma/non-smoker. No black or bloody stools.   Nap a lot. Single. Unclear if reshma. Seen urology for urethral strictures.   No nocturia and flow improved. Weak bladder.   Caffeine in am. No pop. No regular ALCOHOL.   No chest pain. No palpitations. No history mi. Some CAD in parents in 60's.   Semi-retired.   Skin lesion on upper shoulder blade area past 2 months. Flaking/itching.   Medical, social, surgical, and family histories reviewed.    ROS:  All other ROS negative.    OBJECTIVE:  BP (!) 150/94   Pulse 77   Temp 98.1  F (36.7  C) (Oral)   Resp 20   Ht 1.803 m (5' 11\")   Wt 127.5 kg (281 lb)   SpO2 95%   BMI 39.19 kg/m    EXAM:  GENERAL APPEARANCE: healthy, alert and no distress  EYES: EOMI,  PERRL  HENT: ear canals and TM's normal and nose and mouth without ulcers or lesions  NECK: no adenopathy, no asymmetry, masses, or scars and thyroid normal to palpation  RESP: lungs clear to auscultation - no rales, rhonchi or wheezes  CV: regular rates and rhythm, normal S1 S2, no S3 or S4 and no murmur, click or rub -  ABDOMEN:  soft, nontender, no HSM or masses and bowel sounds normal  MS: extremities normal- no gross deformities noted, no evidence of inflammation in joints, FROM in all extremities.  SKIN: raised red papular lesion well circumscribed left upper back/shoulder blade area about 1 cm diameter.   PSYCH: mentation appears normal and affect normal/bright  PSYCH: a little flat    Procedure: Reveiwed " risks and benefits of procedure.  Patient agreeable to plan.  Cryo therapy x2 to lesion. Patient tolerated well. Aftercare and warning signs reviewed    ASSESSMENT / PLAN:  (F33.1) Major depressive disorder, recurrent episode, moderate (H)  (primary encounter diagnosis)  Comment: needs help  Plan: buPROPion (WELLBUTRIN SR) 100 MG 12 hr tablet,         MENTAL HEALTH REFERRAL  - Adult; Outpatient         Treatment; Individual/Couples/Family/Group         [place] [facility] [place] [facility] [phone]; We will         contact you to schedule the appointment or         please call with any questions        Add wellbutrin. Reveiwed risks and side effects of medication  Can increase dosage and lower prozac if helpful with energy/depression and weight loss. Therapist follow-up too. Exercise. If SUICIAL IDEATION OR HOMOCIDAL IDEATION OR YAKOV TO ER. Call/email with questions/concerns.     (Z23) Need for vaccination  Plan: TDAP VACCINE (ADACEL) [39504.002], Pneumococcal        vaccine 13 valent PCV13 IM (Prevnar) [98042],         1st  Administration  [11896], Each additional         admin.  (Right click and add QUANTITY)  [09762]            (I10) Hypertension goal BP (blood pressure) < 140/90  Comment: a little high  Plan: Echocardiogram Exercise Stress        Weight loss/exercise. Patient will self-monitor. Add lisinpril if worse. Return to clinic 2 months. Continue norvasc    (R06.02) SOB (shortness of breath)  Comment: out of shape vs ?  Plan: Echocardiogram Exercise Stress        Stress echo on bike. Weight loss/energy (if stress test ok). Consider more labs if worse or sleep study. Worsening shortness of breath/ chest pain to er. Return to clinic 2 months follow-up. Call/email with questions/concerns     (L98.9) Skin lesion  Comment: hopefully AK vs SK (possible squamous)  Plan: DESTRUCT BENIGN LESION, UP TO 14        Patient wanted cryo to . Will need biopsy/excision if persists.  Return to clinic if worse. Expected course and warning signs reviewed. Call/email with questions/concerns. Reviewed self mole check handout.    Clement De Jesus MD

## 2019-06-05 ASSESSMENT — ANXIETY QUESTIONNAIRES: GAD7 TOTAL SCORE: 9

## 2019-06-12 ENCOUNTER — ANCILLARY PROCEDURE (OUTPATIENT)
Dept: CARDIOLOGY | Facility: CLINIC | Age: 64
End: 2019-06-12
Attending: FAMILY MEDICINE
Payer: COMMERCIAL

## 2019-06-12 DIAGNOSIS — R06.02 SOB (SHORTNESS OF BREATH): ICD-10-CM

## 2019-06-12 DIAGNOSIS — I10 HYPERTENSION GOAL BP (BLOOD PRESSURE) < 140/90: ICD-10-CM

## 2019-06-12 PROCEDURE — 93016 CV STRESS TEST SUPVJ ONLY: CPT | Performed by: INTERNAL MEDICINE

## 2019-06-12 PROCEDURE — 93018 CV STRESS TEST I&R ONLY: CPT | Performed by: INTERNAL MEDICINE

## 2019-06-12 PROCEDURE — 93321 DOPPLER ECHO F-UP/LMTD STD: CPT | Performed by: INTERNAL MEDICINE

## 2019-06-12 PROCEDURE — 93352 ADMIN ECG CONTRAST AGENT: CPT | Performed by: INTERNAL MEDICINE

## 2019-06-12 PROCEDURE — 93325 DOPPLER ECHO COLOR FLOW MAPG: CPT | Performed by: INTERNAL MEDICINE

## 2019-06-12 PROCEDURE — 93017 CV STRESS TEST TRACING ONLY: CPT | Performed by: INTERNAL MEDICINE

## 2019-06-12 PROCEDURE — 93350 STRESS TTE ONLY: CPT | Performed by: INTERNAL MEDICINE

## 2019-06-12 RX ADMIN — Medication 5 ML: at 13:45

## 2019-07-24 DIAGNOSIS — E78.5 HYPERLIPIDEMIA LDL GOAL <130: ICD-10-CM

## 2019-07-24 RX ORDER — ATORVASTATIN CALCIUM 80 MG/1
TABLET, FILM COATED ORAL
Qty: 90 TABLET | Refills: 2 | Status: SHIPPED | OUTPATIENT
Start: 2019-07-24 | End: 2020-01-31

## 2019-08-06 ENCOUNTER — OFFICE VISIT (OUTPATIENT)
Dept: FAMILY MEDICINE | Facility: CLINIC | Age: 64
End: 2019-08-06
Payer: COMMERCIAL

## 2019-08-06 VITALS
SYSTOLIC BLOOD PRESSURE: 136 MMHG | OXYGEN SATURATION: 93 % | HEIGHT: 71 IN | BODY MASS INDEX: 39.2 KG/M2 | DIASTOLIC BLOOD PRESSURE: 78 MMHG | WEIGHT: 280 LBS | HEART RATE: 71 BPM | RESPIRATION RATE: 20 BRPM | TEMPERATURE: 98.2 F

## 2019-08-06 DIAGNOSIS — I10 HYPERTENSION GOAL BP (BLOOD PRESSURE) < 140/90: ICD-10-CM

## 2019-08-06 DIAGNOSIS — F33.1 MAJOR DEPRESSIVE DISORDER, RECURRENT EPISODE, MODERATE (H): ICD-10-CM

## 2019-08-06 DIAGNOSIS — R06.83 SNORING: Primary | ICD-10-CM

## 2019-08-06 PROCEDURE — 99214 OFFICE O/P EST MOD 30 MIN: CPT | Performed by: FAMILY MEDICINE

## 2019-08-06 RX ORDER — AMLODIPINE BESYLATE 5 MG/1
TABLET ORAL
Qty: 90 TABLET | Refills: 1 | Status: SHIPPED | OUTPATIENT
Start: 2019-08-06 | End: 2020-01-28

## 2019-08-06 RX ORDER — BUPROPION HYDROCHLORIDE 75 MG/1
75 TABLET ORAL DAILY
Qty: 30 TABLET | Refills: 5 | Status: SHIPPED | OUTPATIENT
Start: 2019-08-06 | End: 2021-04-13

## 2019-08-06 ASSESSMENT — ANXIETY QUESTIONNAIRES
GAD7 TOTAL SCORE: 0
5. BEING SO RESTLESS THAT IT IS HARD TO SIT STILL: NOT AT ALL
7. FEELING AFRAID AS IF SOMETHING AWFUL MIGHT HAPPEN: NOT AT ALL
6. BECOMING EASILY ANNOYED OR IRRITABLE: NOT AT ALL
3. WORRYING TOO MUCH ABOUT DIFFERENT THINGS: NOT AT ALL
2. NOT BEING ABLE TO STOP OR CONTROL WORRYING: NOT AT ALL
1. FEELING NERVOUS, ANXIOUS, OR ON EDGE: NOT AT ALL

## 2019-08-06 ASSESSMENT — PATIENT HEALTH QUESTIONNAIRE - PHQ9
5. POOR APPETITE OR OVEREATING: NOT AT ALL
SUM OF ALL RESPONSES TO PHQ QUESTIONS 1-9: 4

## 2019-08-06 ASSESSMENT — MIFFLIN-ST. JEOR: SCORE: 2077.2

## 2019-08-06 NOTE — PROGRESS NOTES
"SUBJECTIVE:  Tad Olivas, a 63 year old male scheduled an appointment to discuss the following issues:  Follow-up depression - started on wellbutrin. Shortness of breath - had normal stress echo. Overall wellbutrin overall helpful. Lasts all day but affects sleep.  No sleep aides. Shortness of breath overall ok. Exercise will do more. Active.   Last daytime fatigue. Single unknown if bad snoring.   Back lesion - resolved. Appetite down with wellbutrin but likes it. No chest pain. No nausea, vomiting or diarrhea. .   Medical, social, surgical, and family histories reviewed.    ROS:  All other ROS negative.     OBJECTIVE:  /78   Pulse 71   Temp 98.2  F (36.8  C) (Oral)   Resp 20   Ht 1.803 m (5' 11\")   Wt 127 kg (280 lb)   SpO2 93%   BMI 39.05 kg/m    EXAM:  GENERAL APPEARANCE: healthy, alert and no distress  NECK: no adenopathy, no asymmetry, masses, or scars and thyroid normal to palpation  RESP: lungs clear to auscultation - no rales, rhonchi or wheezes  CV: regular rates and rhythm, normal S1 S2, no S3 or S4 and no murmur, click or rub -  ABDOMEN:  soft, nontender, no HSM or masses and bowel sounds normal  MS: extremities normal- no gross deformities noted, no evidence of inflammation in joints, FROM in all extremities.  SKIN: no suspicious lesions or rashes  PSYCH: mentation appears normal and affect normal/bright    ASSESSMENT / PLAN:  (R06.83) Snoring  (primary encounter diagnosis)  Comment: possible reshma. Single but overall energy and sleep a little better  Plan: [facility]AL[facility]NA[facility]         [facility] [place]          [phone] (Age 15 and up)        Can see sleep specialist with insomnia issues too if not improving. Exercise.     (F33.1) Major depressive disorder, recurrent episode, moderate (H)  Comment: wellbutrin helpful but insomnia issues  Plan: buPROPion (WELLBUTRIN) 75 MG tablet, FLUoxetine        (PROZAC) 20 MG capsule        Will try " 1/2 of wellbutrin 100mg SR for one week and if not helpful patient will fill the 75mg short acting pill. Patient would like to lower prozac from 40mg to 20mg but wait 2 weeks to do that. Reveiwed risks and side effects of medication  If SUICIAL IDEATION OR HOMOCIDAL IDEATION OR YAKOV TO ER. Call/email with questions/concerns. Exercise.     (I10) Hypertension goal BP (blood pressure) < 140/90  Comment: stable  Plan: amLODIPine (NORVASC) 5 MG tablet        Reveiwed risks and side effects of medication  Chest pain to er. Recheck in 6 months      Clement De Jesus MD

## 2019-08-06 NOTE — NURSING NOTE
"Chief Complaint   Patient presents with     Depression       Initial /78   Pulse 71   Temp 98.2  F (36.8  C) (Oral)   Resp 20   Ht 1.803 m (5' 11\")   Wt 127 kg (280 lb)   SpO2 93%   BMI 39.05 kg/m   Estimated body mass index is 39.05 kg/m  as calculated from the following:    Height as of this encounter: 1.803 m (5' 11\").    Weight as of this encounter: 127 kg (280 lb).    Naomy Riley CMA      "

## 2019-08-07 ASSESSMENT — ANXIETY QUESTIONNAIRES: GAD7 TOTAL SCORE: 0

## 2019-08-27 DIAGNOSIS — R10.13 EPIGASTRIC PAIN: ICD-10-CM

## 2019-08-27 DIAGNOSIS — K29.70 GASTRITIS: ICD-10-CM

## 2019-09-10 DIAGNOSIS — K29.70 GASTRITIS: ICD-10-CM

## 2019-09-10 DIAGNOSIS — R10.13 EPIGASTRIC PAIN: ICD-10-CM

## 2019-09-11 NOTE — TELEPHONE ENCOUNTER
Per office visit notes 5/29/19, 5/29/19, medication discontinued.  Patient needs to be called to be certain if he is or isn't taking the medication  Val Guajardo RN

## 2019-09-17 ENCOUNTER — TELEPHONE (OUTPATIENT)
Dept: FAMILY MEDICINE | Facility: CLINIC | Age: 64
End: 2019-09-17

## 2019-09-17 NOTE — TELEPHONE ENCOUNTER
Panel Management Review      Patient has the following on his problem list:     Hypertension   Last three blood pressure readings:  BP Readings from Last 3 Encounters:   08/06/19 136/78   06/04/19 (!) 150/94   05/29/19 119/73     Blood pressure: MONITOR    HTN Guidelines:  Less than 140/90      Composite cancer screening  Chart review shows that this patient is due/due soon for the following None  Summary:    Patient is due/failing the following:   PHYSICAL    Action needed:   Patient needs office visit for physical.    Type of outreach:    Sent [employer] message.    Questions for provider review:    None                                                                                                                                    Aline Danielson MA on 9/17/2019 at 8:31 AM       Chart routed to sent [employer] message .

## 2019-11-06 ENCOUNTER — TELEPHONE (OUTPATIENT)
Dept: FAMILY MEDICINE | Facility: CLINIC | Age: 64
End: 2019-11-06

## 2019-11-06 DIAGNOSIS — Z96.649 STATUS POST HIP REPLACEMENT, UNSPECIFIED LATERALITY: Primary | ICD-10-CM

## 2019-11-06 NOTE — TELEPHONE ENCOUNTER
Pt stopped in clinic and requested to speak with nurse.      Pt states he had a knee replacement at [place] and has been getting this prescription from the ortho provider there for dental appointment's. Pt has a routine dental cleaning appointment coming up and he would like Dr. De Jesus to take over prescribing this antibiotic, Amoxicillin.  To provider to advise.  Naomy CORRIGANN, RN

## 2019-11-06 NOTE — TELEPHONE ENCOUNTER
Pt returned call and left message on triage voice mail, attempted to call pt again and left message for pt to return my call.  Naomy CORRIGANN, RN

## 2019-11-06 NOTE — TELEPHONE ENCOUNTER
I do not see in chart that this has been requested or filled in the past.  Need to know why requesting it and who advised him to request it.    Left message on answering machine for patient to call back to [phone].    Naomy CORRIGANN, RN

## 2019-11-06 NOTE — TELEPHONE ENCOUNTER
Reason for Call:  Medication or medication refill:    Do you use a [facility]?  Name of the pharmacy and phone number for the current request: [id number] - [place], [place] - [address]  [phone]    Name of the medication requested: amoxicillin 500mg    Other request: patient is calling to request medication above, states has scheduled dental procedure.    Can we leave a detailed message on this number? YES    Phone number patient can be reached at: Home number on file [phone] (home)    Best Time:     Call taken on 11/6/2019 at 8:52 AM by Lisa Acevedo

## 2019-11-07 RX ORDER — AMOXICILLIN 500 MG/1
2000 CAPSULE ORAL DAILY
Qty: 4 CAPSULE | Refills: 0 | Status: SHIPPED | OUTPATIENT
Start: 2019-11-07

## 2020-01-28 DIAGNOSIS — I10 HYPERTENSION GOAL BP (BLOOD PRESSURE) < 140/90: ICD-10-CM

## 2020-01-28 RX ORDER — AMLODIPINE BESYLATE 5 MG/1
TABLET ORAL
Qty: 90 TABLET | Refills: 0 | Status: SHIPPED | OUTPATIENT
Start: 2020-01-28 | End: 2020-04-23

## 2020-01-28 NOTE — TELEPHONE ENCOUNTER
"Requested Prescriptions   Signed Prescriptions Disp Refills    amLODIPine (NORVASC) 5 MG tablet 90 tablet 0     Sig: TAKE ONE TABLET BY MOUTH ONCE DAILY FOR BLOOD PRESSURE       Calcium Channel Blockers Protocol  Passed - 1/28/2020  1:06 AM        Passed - Blood pressure under 140/90 in past 12 months     BP Readings from Last 3 Encounters:   08/06/19 136/78   06/04/19 (!) 150/94   05/29/19 119/73                 Passed - Recent (12 mo) or future (30 days) visit within the authorizing provider's specialty     Patient has had an office visit with the authorizing provider or a provider within the authorizing providers department within the previous 12 mos or has a future within next 30 days. See \"Patient Info\" tab in inbasket, or \"Choose Columns\" in Meds & Orders section of the refill encounter.              Passed - Medication is active on med list        Passed - Patient is age 18 or older        Passed - Normal serum creatinine on file in past 12 months     Recent Labs   Lab Test 05/29/19  0837   CR 1.09               "

## 2020-01-29 DIAGNOSIS — I10 HYPERTENSION GOAL BP (BLOOD PRESSURE) < 140/90: ICD-10-CM

## 2020-01-30 DIAGNOSIS — E78.5 HYPERLIPIDEMIA LDL GOAL <130: ICD-10-CM

## 2020-01-30 DIAGNOSIS — F33.1 MAJOR DEPRESSIVE DISORDER, RECURRENT EPISODE, MODERATE (H): ICD-10-CM

## 2020-01-30 RX ORDER — AMLODIPINE BESYLATE 5 MG/1
TABLET ORAL
Qty: 90 TABLET | Refills: 1 | OUTPATIENT
Start: 2020-01-30

## 2020-01-31 ENCOUNTER — TELEPHONE (OUTPATIENT)
Dept: FAMILY MEDICINE | Facility: CLINIC | Age: 65
End: 2020-01-31

## 2020-01-31 ENCOUNTER — MYC MEDICAL ADVICE (OUTPATIENT)
Dept: NURSING | Facility: CLINIC | Age: 65
End: 2020-01-31

## 2020-01-31 DIAGNOSIS — I10 HYPERTENSION GOAL BP (BLOOD PRESSURE) < 140/90: ICD-10-CM

## 2020-01-31 RX ORDER — AMLODIPINE BESYLATE 5 MG/1
TABLET ORAL
Qty: 90 TABLET | Refills: 1 | OUTPATIENT
Start: 2020-01-31

## 2020-01-31 RX ORDER — BUPROPION HYDROCHLORIDE 100 MG/1
TABLET, EXTENDED RELEASE ORAL
Qty: 90 TABLET | Refills: 1 | Status: SHIPPED | OUTPATIENT
Start: 2020-01-31 | End: 2020-07-24

## 2020-01-31 RX ORDER — ATORVASTATIN CALCIUM 80 MG/1
TABLET, FILM COATED ORAL
Qty: 90 TABLET | Refills: 0 | Status: SHIPPED | OUTPATIENT
Start: 2020-01-31 | End: 2020-04-16

## 2020-01-31 ASSESSMENT — PATIENT HEALTH QUESTIONNAIRE - PHQ9
SUM OF ALL RESPONSES TO PHQ QUESTIONS 1-9: 0
SUM OF ALL RESPONSES TO PHQ QUESTIONS 1-9: 0

## 2020-02-01 ASSESSMENT — PATIENT HEALTH QUESTIONNAIRE - PHQ9: SUM OF ALL RESPONSES TO PHQ QUESTIONS 1-9: 0

## 2020-02-24 ENCOUNTER — HEALTH MAINTENANCE LETTER (OUTPATIENT)
Age: 65
End: 2020-02-24

## 2020-04-16 ENCOUNTER — MYC MEDICAL ADVICE (OUTPATIENT)
Dept: FAMILY MEDICINE | Facility: CLINIC | Age: 65
End: 2020-04-16

## 2020-04-16 DIAGNOSIS — E78.5 HYPERLIPIDEMIA LDL GOAL <130: ICD-10-CM

## 2020-04-16 RX ORDER — ATORVASTATIN CALCIUM 80 MG/1
TABLET, FILM COATED ORAL
Qty: 90 TABLET | Refills: 0 | Status: SHIPPED | OUTPATIENT
Start: 2020-04-16 | End: 2020-07-10

## 2020-04-16 NOTE — TELEPHONE ENCOUNTER
Patient will need lab/md appointment in 2-3 months. Please help set-up with previsit fasting labs. Clement De Jesus MD

## 2020-04-18 DIAGNOSIS — E78.5 HYPERLIPIDEMIA LDL GOAL <130: ICD-10-CM

## 2020-04-20 RX ORDER — ATORVASTATIN CALCIUM 80 MG/1
TABLET, FILM COATED ORAL
Qty: 90 TABLET | Refills: 2 | OUTPATIENT
Start: 2020-04-20

## 2020-04-23 DIAGNOSIS — I10 HYPERTENSION GOAL BP (BLOOD PRESSURE) < 140/90: ICD-10-CM

## 2020-04-23 RX ORDER — AMLODIPINE BESYLATE 5 MG/1
TABLET ORAL
Qty: 90 TABLET | Refills: 0 | Status: SHIPPED | OUTPATIENT
Start: 2020-04-23 | End: 2020-05-12

## 2020-04-27 ENCOUNTER — TELEPHONE (OUTPATIENT)
Dept: UROLOGY | Facility: CLINIC | Age: 65
End: 2020-04-27

## 2020-04-27 DIAGNOSIS — N40.0 ENLARGED PROSTATE: ICD-10-CM

## 2020-04-28 DIAGNOSIS — N40.0 ENLARGED PROSTATE: ICD-10-CM

## 2020-04-28 RX ORDER — FINASTERIDE 5 MG/1
1 TABLET, FILM COATED ORAL DAILY
Qty: 90 TABLET | Refills: 3 | OUTPATIENT
Start: 2020-04-28

## 2020-04-28 NOTE — TELEPHONE ENCOUNTER
Per 5/29/19 dicatation from GULSHAN Dodd  Patient requests refills of tamsulosin 0.8 mg once daily and finasteride 5 mg once daily which he has been taking for some time. Refills provided x 1 year. Can continue to be filled by PCP going forward.

## 2020-05-10 DIAGNOSIS — K29.70 GASTRITIS: ICD-10-CM

## 2020-05-10 DIAGNOSIS — R10.13 EPIGASTRIC PAIN: ICD-10-CM

## 2020-05-11 ENCOUNTER — MYC MEDICAL ADVICE (OUTPATIENT)
Dept: FAMILY MEDICINE | Facility: CLINIC | Age: 65
End: 2020-05-11

## 2020-05-11 ENCOUNTER — MYC REFILL (OUTPATIENT)
Dept: UROLOGY | Facility: CLINIC | Age: 65
End: 2020-05-11

## 2020-05-11 DIAGNOSIS — N40.0 ENLARGED PROSTATE: ICD-10-CM

## 2020-05-11 RX ORDER — FINASTERIDE 5 MG/1
1 TABLET, FILM COATED ORAL DAILY
Qty: 90 TABLET | Refills: 3 | Status: CANCELLED | OUTPATIENT
Start: 2020-05-11

## 2020-05-11 NOTE — TELEPHONE ENCOUNTER
Virtual visit in AM please. Video if possible - should be able to do if has a smart phone. Clement De Jesus MD

## 2020-05-12 ENCOUNTER — VIRTUAL VISIT (OUTPATIENT)
Dept: FAMILY MEDICINE | Facility: CLINIC | Age: 65
End: 2020-05-12
Payer: COMMERCIAL

## 2020-05-12 DIAGNOSIS — I10 HYPERTENSION GOAL BP (BLOOD PRESSURE) < 140/90: ICD-10-CM

## 2020-05-12 DIAGNOSIS — F33.1 MAJOR DEPRESSIVE DISORDER, RECURRENT EPISODE, MODERATE (H): ICD-10-CM

## 2020-05-12 PROCEDURE — 99214 OFFICE O/P EST MOD 30 MIN: CPT | Mod: 95 | Performed by: FAMILY MEDICINE

## 2020-05-12 RX ORDER — AMLODIPINE BESYLATE 5 MG/1
TABLET ORAL
Qty: 1 TABLET | Refills: 0 | COMMUNITY
Start: 2020-05-12 | End: 2020-07-24

## 2020-05-12 NOTE — PROGRESS NOTES
"Tad Olivas is a 64 year old male who is being evaluated via a billable video visit.    LIGHTHEADED on/off for years.  History htn, bph, depression, high cholesterol and gerd.   Semi-retired and sleeping. Blood pressure ok overall recently - lower than last year.   Exercise walking. No chest pain or shortness of breath. Working from home and eating from home.   Taking norvasc and flomax. Urine flow overall ok. Water intake ok. Coffee in AM. No black/bloody stools. No abdominal pain. Taking 1/2 wellbutrin. Emotionally doing ok. No SUICIAL IDEATION OR HOMOCIDAL IDEATION OR YAKOV.    The patient has been notified of following:     \"This video visit will be conducted via a call between you and your physician/provider. We have found that certain health care needs can be provided without the need for an in-person physical exam.  This service lets us provide the care you need with a video conversation.  If a prescription is necessary we can send it directly to your pharmacy.  If lab work is needed we can place an order for that and you can then stop by our lab to have the test done at a later time.    Video visits are billed at different rates depending on your insurance coverage.  Please reach out to your insurance provider with any questions.    If during the course of the call the physician/provider feels a video visit is not appropriate, you will not be charged for this service.\"    Patient has given verbal consent for Video visit? Yes    How would you like to obtain your AVS? Darryl    Patient would like the video invitation sent by: Text to cell phone: [phone]    Will anyone else be joining your video visit? No      Subjective     Tad Olivas is a 64 year old male who presents today via video visit for the following health issues:    [facility]      Video Start Time: 8:58 AM    PROBLEMS TO ADD ON...    Patient Active Problem List   Diagnosis     Biceps tendon tear     HYPERLIPIDEMIA LDL GOAL <130     Elbow " "pain     Moderate major depression (H)     Erectile dysfunction     Gross hematuria     Urethritis     Advanced directives, counseling/discussion     Renal cysts, acquired, bilateral     Decreased urine stream     Depression     Major depressive disorder, recurrent episode, moderate (H)     Urethral stricture     Hypertension goal BP (blood pressure) < 140/90     History of urethral stricture     Gastroesophageal reflux disease without esophagitis     Past Surgical History:   Procedure Laterality Date     BIOPSY  2017    colon     COLONOSCOPY  2017     ORTHOPEDIC SURGERY      right knee replacement     REPAIR TENDON BICEPS DISTAL UPPER EXTREMITY      10/15/10     SOFT TISSUE SURGERY       SURGICAL HISTORY OF -       rt shoulder cartilage repair     SURGICAL HISTORY OF -       cartilage .ligament ,arthroscopy     URETHROPLASTY WITH BUCCAL GRAFT N/A 10/14/2016    Procedure: URETHROPLASTY WITH BUCCAL GRAFT;  Surgeon: Christiano Leger MD;  Location: [facility] OR     UROLOGY PROCEDURE                         DATE:  10.15.2016    urithral stricture        Social History     Tobacco Use     Smoking status: Former Smoker     Last attempt to quit: 1970     Years since quittin.4     Smokeless tobacco: Never Used     Tobacco comment: no 2nd hand smoke exposure   Substance Use Topics     Alcohol use: Yes     Comment: occ.     Family History   Problem Relation Age of Onset     Coronary Artery Disease Mother      Hyperlipidemia Mother      Diabetes Father      Cerebrovascular Disease Father      Breast Cancer Father      Anesthesia Reaction Father            Reviewed and updated as needed this visit by Provider         Review of Systems   All other ROS negative.       Objective    There were no vitals taken for this visit.  Estimated body mass index is 39.05 kg/m  as calculated from the following:    Height as of 19: 1.803 m (5' 11\").    Weight as of 19: 127 kg (280 lb).  Physical Exam     GENERAL: Healthy, " alert and no distress  EYES: Eyes grossly normal to inspection.  No discharge or erythema, or obvious scleral/conjunctival abnormalities.  RESP: No audible wheeze, cough, or visible cyanosis.  No visible retractions or increased work of breathing.    SKIN: Visible skin clear. No significant rash, abnormal pigmentation or lesions.  NEURO: Cranial nerves grossly intact.  Mentation and speech appropriate for age.  PSYCH: Mentation appears normal, affect normal/bright, judgement and insight intact, normal speech and appearance well-groomed.      Diagnostic Test Results:  Labs reviewed in Epic      ASSESSMENT / PLAN:  (I10) Hypertension goal BP (blood pressure) < 140/90  Comment: a little over treatment   Plan: amLODIPine (NORVASC) 5 MG tablet        Decrease to 1/2 pill daily and continue closely monitor. Drink more water and limit caffeine. Continue exercise. Chest pain or shortness of breath to er. Get up slowly. Return to clinic sooner than late summer for exam/labs if not improving/ new issues. Expected course and warning signs reviewed. .Call/email with questions/concerns     (F33.1) Major depressive disorder, recurrent episode, moderate (H)  Comment: stable  Plan: continue meds. Consider lower prozac dosage. Continue exercise. If SUICIAL IDEATION OR HOMOCIDAL IDEATION OR YAKOV TO ER           Video-Visit Details    Type of service:  Video Visit    Video End Time:9:11 AM    Originating Location (pt. Location): Home    Distant Location (provider location):  [facility]     Platform used for Video Visit: Doximity    No follow-ups on file.       Clement De Jesus MD

## 2020-05-18 ENCOUNTER — MYC MEDICAL ADVICE (OUTPATIENT)
Dept: FAMILY MEDICINE | Facility: CLINIC | Age: 65
End: 2020-05-18

## 2020-05-21 ENCOUNTER — NURSE TRIAGE (OUTPATIENT)
Dept: NURSING | Facility: CLINIC | Age: 65
End: 2020-05-21

## 2020-05-21 DIAGNOSIS — Z20.822 COVID-19 RULED OUT: ICD-10-CM

## 2020-05-21 DIAGNOSIS — Z20.822 COVID-19 RULED OUT: Primary | ICD-10-CM

## 2020-05-21 PROCEDURE — 99000 SPECIMEN HANDLING OFFICE-LAB: CPT | Performed by: EMERGENCY MEDICINE

## 2020-05-21 PROCEDURE — 86769 SARS-COV-2 COVID-19 ANTIBODY: CPT | Mod: 90 | Performed by: EMERGENCY MEDICINE

## 2020-05-21 PROCEDURE — 36415 COLL VENOUS BLD VENIPUNCTURE: CPT | Performed by: EMERGENCY MEDICINE

## 2020-05-21 NOTE — TELEPHONE ENCOUNTER
"Travon is calling about serology testing.    Patient is calling requesting COVID serologic antibody testing.  NOTE: Serologic testing is a blood test for 'antibodies' which are made at 10-14 days after you have had symptoms of COVID or were exposed and had an asymptomatic infection.  This does NOT test you for 'active' infection or tell you if you are contagious.    Are you a healthcare worker?  No  Do you currently have a cough, fever, body aches, shortness of breath, or difficulty breathing?  No  Have you been exposed to (or come into close contact with) someone who tested POSITIVE for COVID-19 or someone who had a possible case of COVID-19?  Possible exposure 60 days ago.  Possible exposure > 14 days ago.      The patient was informed: \"Testing is limited each day and it may take time for testing to be available to everyone who has called.  We will be calling you to schedule testing- please confirm the best number to reach you is [phone].\"    Lab order placed per SARS-CoV-2 Serology test Standing Order.            "

## 2020-05-22 LAB
COVID-19 SPIKE RBD ABY TITER: NORMAL
COVID-19 SPIKE RBD ABY: NEGATIVE

## 2020-05-28 DIAGNOSIS — N40.0 ENLARGED PROSTATE: ICD-10-CM

## 2020-05-28 RX ORDER — FINASTERIDE 5 MG/1
1 TABLET, FILM COATED ORAL DAILY
Qty: 90 TABLET | Refills: 0 | Status: SHIPPED | OUTPATIENT
Start: 2020-05-28 | End: 2020-07-21

## 2020-07-01 ENCOUNTER — TELEPHONE (OUTPATIENT)
Dept: FAMILY MEDICINE | Facility: CLINIC | Age: 65
End: 2020-07-01

## 2020-07-01 RX ORDER — TAMSULOSIN HYDROCHLORIDE 0.4 MG/1
0.8 CAPSULE ORAL DAILY
Qty: 180 CAPSULE | Refills: 3 | OUTPATIENT
Start: 2020-07-01

## 2020-07-01 NOTE — TELEPHONE ENCOUNTER
Patient is scheduled on 8/3/20 with Clement De Jesus MD.  This appointment needs to be rescheduled as the provider is going to be unavailable.  Patient was contacted by: Phone. Left message for patient to call and schedule appointment .Rae Carranza MA/MARIA D

## 2020-07-01 NOTE — TELEPHONE ENCOUNTER
tamsulosin (FLOMAX) 0.4 MG capsule   Last Written Prescription Date:  5/29/2019  Last Fill Quantity: 180,   # refills: 3  Last Office Visit : 5/29/2019  Future Office visit:  None    Routing refill request to provider for review/approval because:  Fails protocol: Sildenafil also on medication list

## 2020-07-07 NOTE — TELEPHONE ENCOUNTER
Left a detailed VM patient needs to call and get a video visit with Janet to continue to get his Flowmax refilled

## 2020-07-10 DIAGNOSIS — E78.5 HYPERLIPIDEMIA LDL GOAL <130: ICD-10-CM

## 2020-07-10 RX ORDER — ATORVASTATIN CALCIUM 80 MG/1
TABLET, FILM COATED ORAL
Qty: 90 TABLET | Refills: 0 | Status: SHIPPED | OUTPATIENT
Start: 2020-07-10 | End: 2020-10-05

## 2020-07-10 NOTE — TELEPHONE ENCOUNTER
"Routing refill request to provider for review/approval because:Failed protocol. Labs not current. Has pending apt for physical 8/13/20. Do you want pre visit labs?. Barb Doshi RN    Requested Prescriptions   Pending Prescriptions Disp Refills     atorvastatin (LIPITOR) 80 MG tablet [Pharmacy Med Name: ATORVASTATIN CALCIUM 80MG TABS] 90 tablet 0     Sig: TAKE ONE TABLET BY MOUTH ONCE DAILY FOR CHOLESTEROL       Statins Protocol Failed - 7/10/2020  1:09 AM        Failed - LDL on file in past 12 months     Recent Labs   Lab Test 05/29/19  0837   *             Passed - No abnormal creatine kinase in past 12 months     No lab results found.             Passed - Recent (12 mo) or future (30 days) visit within the authorizing provider's specialty     Patient has had an office visit with the authorizing provider or a provider within the authorizing providers department within the previous 12 mos or has a future within next 30 days. See \"Patient Info\" tab in inbasket, or \"Choose Columns\" in Meds & Orders section of the refill encounter.              Passed - Medication is active on med list        Passed - Patient is age 18 or older                 "

## 2020-07-21 ENCOUNTER — VIRTUAL VISIT (OUTPATIENT)
Dept: UROLOGY | Facility: CLINIC | Age: 65
End: 2020-07-21
Payer: COMMERCIAL

## 2020-07-21 DIAGNOSIS — N52.9 ERECTILE DYSFUNCTION, UNSPECIFIED ERECTILE DYSFUNCTION TYPE: ICD-10-CM

## 2020-07-21 DIAGNOSIS — N40.0 ENLARGED PROSTATE: ICD-10-CM

## 2020-07-21 DIAGNOSIS — R39.198 SLOW URINARY STREAM: Primary | ICD-10-CM

## 2020-07-21 RX ORDER — TAMSULOSIN HYDROCHLORIDE 0.4 MG/1
0.4 CAPSULE ORAL DAILY
Qty: 90 CAPSULE | Refills: 3 | Status: SHIPPED | OUTPATIENT
Start: 2020-07-21 | End: 2021-08-09

## 2020-07-21 RX ORDER — FINASTERIDE 5 MG/1
1 TABLET, FILM COATED ORAL DAILY
Qty: 90 TABLET | Refills: 3 | Status: SHIPPED | OUTPATIENT
Start: 2020-07-21 | End: 2021-08-09

## 2020-07-21 RX ORDER — SILDENAFIL 50 MG/1
50-100 TABLET, FILM COATED ORAL PRN
Qty: 30 TABLET | Refills: 3 | Status: SHIPPED | OUTPATIENT
Start: 2020-07-21 | End: 2022-06-24

## 2020-07-21 ASSESSMENT — PAIN SCALES - GENERAL: PAINLEVEL: NO PAIN (0)

## 2020-07-21 NOTE — NURSING NOTE
Chief Complaint   Patient presents with     RECHECK     urethral stricture follow up       There were no vitals taken for this visit. There is no height or weight on file to calculate BMI.    Patient Active Problem List   Diagnosis     Biceps tendon tear     HYPERLIPIDEMIA LDL GOAL <130     Elbow pain     Moderate major depression (H)     Erectile dysfunction     Gross hematuria     Urethritis     Advanced directives, counseling/discussion     Renal cysts, acquired, bilateral     Decreased urine stream     Depression     Major depressive disorder, recurrent episode, moderate (H)     Urethral stricture     Hypertension goal BP (blood pressure) < 140/90     History of urethral stricture     Gastroesophageal reflux disease without esophagitis       No Known Allergies    Current Outpatient Medications   Medication Sig Dispense Refill     amLODIPine (NORVASC) 5 MG tablet TAKE 1/2 TABLET BY MOUTH ONCE DAILY FOR BLOOD PRESSURE 1 tablet 0     ascorbic acid (VITAMIN C) 500 MG tablet Take 1 tablet by mouth daily. (Patient taking differently: Take 1,000 mg by mouth daily ) 100 tablet 3     aspirin 81 MG tablet Take 81 mg by mouth daily        atorvastatin (LIPITOR) 80 MG tablet TAKE ONE TABLET BY MOUTH ONCE DAILY FOR CHOLESTEROL 90 tablet 0     buPROPion (WELLBUTRIN SR) 100 MG 12 hr tablet TAKE ONE TABLET BY MOUTH EVERY MORNING FOR ENERGY / WEIGHT LOSS / DEPRESSION 90 tablet 1     Cholecalciferol (VITAMIN D3 PO) Take 2,000 Units by mouth daily       finasteride (PROSCAR) 5 MG tablet Take 1 tablet (5 mg) by mouth daily 90 tablet 0     FLUoxetine (PROZAC) 20 MG capsule TAKE ONE CAPSULE BY MOUTH ONCE DAILY FOR DEPRESSION 90 capsule 1     Glucosamine-Chondroit-Vit C-Mn (GLUCOSAMINE-CHONDROITINOITIN) CAPS Take 1 each by mouth daily. 90 capsule 3     Multiple vitamin TABS Take 1 tablet by mouth daily. 90 tablet 3     Naproxen Sodium (ALEVE PO) Take 220 mg by mouth daily        omeprazole (PRILOSEC) 20 MG DR capsule TAKE 1 CAPSULE BY  MOUTH EVERY OTHER DAY 30 TO 60 MINUTES BEFORE A MEAL TO PROTECT STOMACH AND HEARTBURN 45 capsule 0     sildenafil (VIAGRA) 50 MG tablet Take 1-2 tablets ( mg) by mouth as needed (30-60 minutes prior to sexual activity) 30 tablet 3     tamsulosin (FLOMAX) 0.4 MG capsule Take 2 capsules (0.8 mg) by mouth daily 180 capsule 3     amoxicillin (AMOXIL) 500 MG capsule Take 4 capsules (2,000 mg) by mouth daily For one dose prior to dental procedures (Patient not taking: Reported on 2020) 4 capsule 0     buPROPion (WELLBUTRIN) 75 MG tablet Take 1 tablet (75 mg) by mouth daily (Patient not taking: Reported on 2020) 30 tablet 5     FLUoxetine (PROZAC) 40 MG capsule TAKE ONE CAPSULE BY MOUTH ONCE DAILY (Patient not taking: Reported on 2020) 90 capsule 3     omeprazole (PRILOSEC) 20 MG DR capsule TAKE 1 CAPSULE BY MOUTH EVERY OTHER DAY 30-60 MINUTES BEFORE A MEAL TO PROTECT STOMACH AND HEARTBURN (Patient not taking: Reported on 2020) 45 capsule 1       Social History     Tobacco Use     Smoking status: Former Smoker     Last attempt to quit: 1970     Years since quittin.5     Smokeless tobacco: Never Used     Tobacco comment: no 2nd hand smoke exposure   Substance Use Topics     Alcohol use: Yes     Comment: occ.     Drug use: No       Liliam Haney CMA  2020  11:15 AM

## 2020-07-21 NOTE — LETTER
"7/21/2020       RE: Tad Olivas  [id number] [address]     Dear Colleague,    Thank you for referring your patient, Tad Olivas, to the [facility] UROLOGY AND INST FOR PROSTATE AND UROLOGIC CANCERS at [facility]. Please see a copy of my visit note below.    Tad Olivas is a 64 year old male who is being evaluated via a billable telephone visit.      The patient has been notified of following:     \"This telephone visit will be conducted via a call between you and your physician/provider. We have found that certain health care needs can be provided without the need for a physical exam.  This service lets us provide the care you need with a short phone conversation.  If a prescription is necessary we can send it directly to your pharmacy.  If lab work is needed we can place an order for that and you can then stop by our lab to have the test done at a later time.    Telephone visits are billed at different rates depending on your insurance coverage. During this emergency period, for some insurers they may be billed the same as an in-person visit.  Please reach out to your insurance provider with any questions.    If during the course of the call the physician/provider feels a telephone visit is not appropriate, you will not be charged for this service.\"    Patient has given verbal consent for Telephone visit?  Yes    What phone number would you like to be contacted at?    How would you like to obtain your AVS? [facility]    Urology Virtual Visit - Follow up    Reason for visit: medication refills    HPI: Mr. Tad Olivas is a very pleasant 64 year old male who is 3.5 years s/p buccal graft dorsal onlay urethroplasty for a history of bulbar and penile urethral strictures. Last office visit with Dr. Leger on 10/30/17 at which time cystoscopy revealed no evidence for stricture recurrence. He continued to complain of slow flow after surgery so was referred " for urodynamics testing, performed on 5/29/19. Results summarized as follows:     -Normal bladder capacity (325 mL) with normal filling sensations.  -Normal bladder compliance without DO/DOI.  -No SERGE.  -Fair detrusor contraction during voiding to max Pdet 13 cm H2O.  -Reduced flow rate (Qmax 8.3 mL/s) with a continuous flow curve and complete bladder emptying (final PVR 0 mL).  -No evidence for DSD or bladder outlet obstruction.  -Fluoroscopy reveals a mildly trabeculated bladder wall without diverticuli or VUR. Bladder neck is closed during filling and open during voiding with some possible narrowing noted within the prostatic urethra.      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 He was offered to start CIC, but deferred as he was emptying well. He has continued to take tamsulosin 0.8 mg daily and finasteride 5 mg daily. He can tell a difference when he forgets to take these medications as his flow will be noticeably worse. He does complain of lightheadedness on occasion. Has been working with his PCP on his blood pressures which have been low recently.    He continues to have erectile dysfunction following his urethroplasty. Cannot attain erections satisfactory for intercourse. Tried sildenafil (50 mg) a year ago and didn't notice much of an improvement. Since then, he has retired and has much less stress in his life, so wonders if this may help his erections.       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 Symptoms stable with tamsulosin and finasteride, though he has been having symptomatic hypotension with high dose alpha blocker. Will decrease dose. Also with ED, not responsive to low dose PDE-5 inhibitor. Discussed higher dose, intracavernosal injection therapy, vacuum device,  intraurethral suppository ([place]), vs other for ED. He would like to start with a higher dose of PDE-5i.    -Decrease tamsulosin to 0.4 mg once daily at bedtime.  -Continue finasteride 5 mg daily.  -Sildenafil 100 mg PRN. If no improvement, will consider ICI as a next step.     Otherwise, follow up in 1 year, sooner as needed. If patient prefers, PCP can also continue to fill medications and he can follow up with urology PRN.    Phone call duration: 15 minutes    Janet Dodd PA-C

## 2020-07-21 NOTE — PROGRESS NOTES
"Tad Olivas is a 64 year old male who is being evaluated via a billable telephone visit.      The patient has been notified of following:     \"This telephone visit will be conducted via a call between you and your physician/provider. We have found that certain health care needs can be provided without the need for a physical exam.  This service lets us provide the care you need with a short phone conversation.  If a prescription is necessary we can send it directly to your pharmacy.  If lab work is needed we can place an order for that and you can then stop by our lab to have the test done at a later time.    Telephone visits are billed at different rates depending on your insurance coverage. During this emergency period, for some insurers they may be billed the same as an in-person visit.  Please reach out to your insurance provider with any questions.    If during the course of the call the physician/provider feels a telephone visit is not appropriate, you will not be charged for this service.\"    Patient has given verbal consent for Telephone visit?  Yes    What phone number would you like to be contacted at?    How would you like to obtain your AVS? [facility]    Urology Virtual Visit - Follow up    Reason for visit: medication refills    HPI: Mr. Tad Olivas is a very pleasant 64 year old male who is 3.5 years s/p buccal graft dorsal onlay urethroplasty for a history of bulbar and penile urethral strictures. Last office visit with Dr. Leger on 10/30/17 at which time cystoscopy revealed no evidence for stricture recurrence. He continued to complain of slow flow after surgery so was referred for urodynamics testing, performed on 5/29/19. Results summarized as follows:     -Normal bladder capacity (325 mL) with normal filling sensations.  -Normal bladder compliance without DO/DOI.  -No SERGE.  -Fair detrusor contraction during voiding to max Pdet 13 cm H2O.  -Reduced flow rate (Qmax 8.3 mL/s) with a " continuous flow curve and complete bladder emptying (final PVR 0 mL).  -No evidence for DSD or bladder outlet obstruction.  -Fluoroscopy reveals a mildly trabeculated bladder wall without diverticuli or VUR. Bladder neck is closed during filling and open during voiding with some possible narrowing noted within the prostatic urethra.      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 He was offered to start CIC, but deferred as he was emptying well. He has continued to take tamsulosin 0.8 mg daily and finasteride 5 mg daily. He can tell a difference when he forgets to take these medications as his flow will be noticeably worse. He does complain of lightheadedness on occasion. Has been working with his PCP on his blood pressures which have been low recently.    He continues to have erectile dysfunction following his urethroplasty. Cannot attain erections satisfactory for intercourse. Tried sildenafil (50 mg) a year ago and didn't notice much of an improvement. Since then, he has retired and has much less stress in his life, so wonders if this may help his erections.       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 Symptoms stable with tamsulosin and finasteride, though he has been having symptomatic hypotension with high dose alpha blocker. Will decrease dose. Also with ED, not responsive to low dose PDE-5 inhibitor. Discussed higher dose, intracavernosal injection therapy, vacuum device, intraurethral suppository ([facility]), vs other for ED. He would like to start with a higher dose of PDE-5i.    -Decrease tamsulosin to 0.4 mg once daily at bedtime.  -Continue finasteride 5 mg daily.  -Sildenafil 100 mg PRN. If no improvement, will consider ICI as a next step.     Otherwise, follow up in 1 year,  sooner as needed. If patient prefers, PCP can also continue to fill medications and he can follow up with urology PRN.    Phone call duration: 15 minutes    Janet Dodd PA-C

## 2020-07-21 NOTE — PATIENT INSTRUCTIONS
UROLOGY CLINIC VISIT PATIENT INSTRUCTIONS    1) Decrease your dose of tamsulosin (Flomax) to 0.4 mg (1 capsule instead of 2) once daily before bed. Hopefully the lower dose will help with your lightheadedness issues. Refills were sent to [facility] and are good for 1 year.    2) Continue taking finasteride (Proscar) 5 mg once daily. Refills were sent to [facility] and are good for 1 year.    3) A refill of sildenafil (generic Viagra) was sent to [facility] pharmacy and will be kept on file. They will not fill the prescription until you contact them.  -Take 1 or 2 tablets by mouth 30-60 minutes prior to sexual activity.  -If you do not get the response you are looking for, send a DTVCast message or call and we can discuss additional treatment options (such as intracavernosal injection therapy as briefly discussed on the phone).    Otherwise, follow up in 1 year for continued refills. Unless you would like your primary care doctor to continue providing refills, in which case you would not need to see us for a separate visit.    If you have any issues, questions or concerns in the meantime, do not hesitate to contact us at [phone] or via DTVCast.     It was a pleasure meeting with you today.  Thank you for allowing me and my team the privilege of caring for you today.  YOU are the reason we are here, and I truly hope we provided you with the excellent service you deserve.  Please let us know if there is anything else we can do for you so that we can be sure you are leaving completely satisfied with your care experience.

## 2020-07-23 ENCOUNTER — TELEPHONE (OUTPATIENT)
Dept: UROLOGY | Facility: CLINIC | Age: 65
End: 2020-07-23

## 2020-07-24 DIAGNOSIS — F33.1 MAJOR DEPRESSIVE DISORDER, RECURRENT EPISODE, MODERATE (H): ICD-10-CM

## 2020-07-24 DIAGNOSIS — I10 HYPERTENSION GOAL BP (BLOOD PRESSURE) < 140/90: ICD-10-CM

## 2020-07-24 RX ORDER — BUPROPION HYDROCHLORIDE 100 MG/1
TABLET, EXTENDED RELEASE ORAL
Qty: 90 TABLET | Refills: 0 | Status: SHIPPED | OUTPATIENT
Start: 2020-07-24 | End: 2021-10-19

## 2020-07-24 RX ORDER — AMLODIPINE BESYLATE 5 MG/1
TABLET ORAL
Qty: 90 TABLET | Refills: 0 | Status: SHIPPED | OUTPATIENT
Start: 2020-07-24 | End: 2021-10-19

## 2020-07-24 NOTE — TELEPHONE ENCOUNTER
Routing refill request to provider for review/approval because:  Medication is reported/historical  Naomy Fernandez BSN, RN

## 2020-08-10 NOTE — PROGRESS NOTES
"  SUBJECTIVE:   Tad Olivas is a 64 year old male who presents for Preventive Visit.  {PVP to remind patient that this is not necessarily a physical exam; physical exam may or may not be done:675111::\"click delete button to remove this line now\"}  {PVP to inform patient that additional E&M charge may apply, if additional problems addressed:458688::\"click delete button to remove this line now\"}  Are you in the first 12 months of your Medicare Part B coverage?  { :285687::\"No\"}    Physical Health:    In general, how would you rate your overall physical health? { :321493}    Outside of work, how many days during the week do you exercise? { :328393}    Outside of work, approximately how many minutes a day do you exercise?{ :305333}    If you drink alcohol do you typically have >3 drinks per day or >7 drinks per week? { :023138}    Do you usually eat at least 4 servings of fruit and vegetables a day, include whole grains & fiber and avoid regularly eating high fat or \"junk\" foods? { :503901::\"Yes\"}    Do you have any problems taking medications regularly?  { :925586::\"No\"}    Do you have any side effects from medications? { :951173}    Needs assistance for the following daily activities: { :927468}    Which of the following safety concerns are present in your home?  { :639007::\"none identified\"}     Hearing impairment: { :598394}    In the past 6 months, have you been bothered by leaking of urine? { :410547}    Mental Health:    In general, how would you rate your overall mental or emotional health? { :994394}  PHQ-2 Score:      Do you feel safe in your environment? { :294886}    Have you ever done Advance Care Planning? (For example, a Health Directive, POLST, or a discussion with a medical provider or your loved ones about your wishes): { :172875}    Additional concerns to address?  {If YES, notify patient they may be responsible for a copay, coinsurance or deductible if the visit today includes services such as " "checking on a sore throat, having an x-ray or lab test, or treating and evaluating a new or existing condition :083240::\"No\"}    Fall risk:  { :734971}  {If any of the above assessments are answered yes, consider ordering appropriate referrals (Optional):782608::\"click delete button to remove this line now\"}  Cognitive Screening: { :168501}    {Do you have sleep apnea, excessive snoring or daytime drowsiness? (Optional):876400}    {Outside tests to abstract? :487092}    {additional problems to add (Optional):397980}    Reviewed and updated as needed this visit by clinical staff         Reviewed and updated as needed this visit by Provider        Social History     Tobacco Use     Smoking status: Former Smoker     Last attempt to quit: 1970     Years since quittin.6     Smokeless tobacco: Never Used     Tobacco comment: no 2nd hand smoke exposure   Substance Use Topics     Alcohol use: Yes     Comment: occ.                           Current providers sharing in care for this patient include: {Rooming staff:  Please update Care Team in Rooming Activity, refresh this note and then delete this statement}  Patient Care Team:  Clement De Jesus MD as PCP - General  Clement De Jesus MD as Assigned PCP  Christiano Leger MD as MD (Urology)  Kellen Barton, RN as Registered Nurse (Urology)  Clement De Jesus MD as Referring Physician (Family Practice)  Janet Dodd PA-C as Physician Assistant (Physician Assistant)    The following health maintenance items are reviewed in Epic and correct as of today:  Health Maintenance   Topic Date Due     ZOSTER IMMUNIZATION (2 of 3) 2017     MEDICARE ANNUAL WELLNESS VISIT  10/23/2018     AORTIC ANEURYSM SCREENING (SYSTEM ASSIGNED)  10/23/2018     LIPID  2020     FALL RISK ASSESSMENT  2020     PNEUMOCOCCAL IMMUNIZATION 65+ LOW/MEDIUM RISK (2 of 2 - PPSV23) 2020     PHQ-9  2020     INFLUENZA VACCINE (1) 2020     ADVANCE " "CARE PLANNING  10/27/2022     COLORECTAL CANCER SCREENING  2027     DTAP/TDAP/TD IMMUNIZATION (3 - Td) 2029     HEPATITIS C SCREENING  Completed     DEPRESSION ACTION PLAN  Completed     IPV IMMUNIZATION  Aged Out     MENINGITIS IMMUNIZATION  Aged Out     {Chronicprobdata (Optional):186938}  {Decision Support (Optional):814398}    ROS:  {ROS COMP:282387}    OBJECTIVE:   There were no vitals taken for this visit. Estimated body mass index is 39.05 kg/m  as calculated from the following:    Height as of 19: 1.803 m (5' 11\").    Weight as of 19: 127 kg (280 lb).  EXAM:   {Exam :708410}    {Diagnostic Test Results (Optional):900926::\"Diagnostic Test Results:\",\"Labs reviewed in Epic\"}    ASSESSMENT / PLAN:   {Diag Picklist:710376}    COUNSELING:  {Medicare Counselin}    Estimated body mass index is 39.05 kg/m  as calculated from the following:    Height as of 19: 1.803 m (5' 11\").    Weight as of 19: 127 kg (280 lb).    {Weight Management Plan (ACO) Complete if BMI is abnormal-  Ages 18-64  BMI >24.9.  Age 65+ with BMI <23 or >30 (Optional):712453}     reports that he quit smoking about 49 years ago. He has never used smokeless tobacco.  {Tobacco Cessation -- Complete if patient is a smoker (Optional):329515}    Appropriate preventive services were discussed with this patient, including applicable screening as appropriate for cardiovascular disease, diabetes, osteopenia/osteoporosis, and glaucoma.  As appropriate for age/gender, discussed screening for colorectal cancer, prostate cancer, breast cancer, and cervical cancer. Checklist reviewing preventive services available has been given to the patient.    Reviewed patients plan of care and provided an AVS. The {CarePlan:261418} for Tad meets the Care Plan requirement. This Care Plan has been established and reviewed with the {PATIENT, FAMILY MEMBER, CAREGIVER:335426}.    Counseling Resources:  ATP IV Guidelines  Pooled Cohorts " Equation Calculator  Breast Cancer Risk Calculator  FRAX Risk Assessment  ICSI Preventive Guidelines  Dietary Guidelines for Americans, 2010  [employer]'s MyPlate  ASA Prophylaxis  Lung CA Screening    Clement De Jesus MD  [facility]

## 2020-08-10 NOTE — PATIENT INSTRUCTIONS
Patient Education   Personalized Prevention Plan  You are due for the preventive services outlined below.  Your care team is available to assist you in scheduling these services.  If you have already completed any of these items, please share that information with your care team to update in your medical record.  Health Maintenance Due   Topic Date Due     Zoster (Shingles) Vaccine (2 of 3) 12/22/2017     Annual Wellness Visit  10/23/2018     AORTIC ANEURYSM SCREENING (SYSTEM ASSIGNED)  10/23/2018     Cholesterol Lab  05/29/2020     FALL RISK ASSESSMENT  08/06/2020     Pneumococcal Vaccine (2 of 2 - PPSV23) 06/04/2020     Depression Assessment  07/31/2020

## 2020-08-12 ASSESSMENT — ENCOUNTER SYMPTOMS
HEARTBURN: 0
ARTHRALGIAS: 1
WEAKNESS: 1
HEMATURIA: 0
CHILLS: 0
NERVOUS/ANXIOUS: 0
SHORTNESS OF BREATH: 1
PARESTHESIAS: 0
DIZZINESS: 1
FEVER: 0
NAUSEA: 0
DIARRHEA: 0
HEADACHES: 0
HEMATOCHEZIA: 0
JOINT SWELLING: 1
EYE PAIN: 0
SORE THROAT: 0
COUGH: 0
MYALGIAS: 1
DYSURIA: 0
ABDOMINAL PAIN: 0
CONSTIPATION: 0
PALPITATIONS: 0
FREQUENCY: 0

## 2020-08-12 ASSESSMENT — ACTIVITIES OF DAILY LIVING (ADL): CURRENT_FUNCTION: NO ASSISTANCE NEEDED

## 2020-08-13 ENCOUNTER — OFFICE VISIT (OUTPATIENT)
Dept: FAMILY MEDICINE | Facility: CLINIC | Age: 65
End: 2020-08-13
Payer: COMMERCIAL

## 2020-08-13 VITALS
OXYGEN SATURATION: 96 % | TEMPERATURE: 97 F | WEIGHT: 285 LBS | HEART RATE: 85 BPM | HEIGHT: 71 IN | SYSTOLIC BLOOD PRESSURE: 136 MMHG | BODY MASS INDEX: 39.9 KG/M2 | DIASTOLIC BLOOD PRESSURE: 80 MMHG

## 2020-08-13 DIAGNOSIS — E66.01 MORBID OBESITY (H): ICD-10-CM

## 2020-08-13 DIAGNOSIS — M25.522 ELBOW PAIN, LEFT: ICD-10-CM

## 2020-08-13 DIAGNOSIS — R10.13 EPIGASTRIC PAIN: ICD-10-CM

## 2020-08-13 DIAGNOSIS — M79.645 THUMB PAIN, LEFT: ICD-10-CM

## 2020-08-13 DIAGNOSIS — E78.5 HYPERLIPIDEMIA LDL GOAL <130: ICD-10-CM

## 2020-08-13 DIAGNOSIS — I10 HYPERTENSION GOAL BP (BLOOD PRESSURE) < 140/90: ICD-10-CM

## 2020-08-13 DIAGNOSIS — Z12.5 SCREENING PSA (PROSTATE SPECIFIC ANTIGEN): ICD-10-CM

## 2020-08-13 DIAGNOSIS — K21.9 GASTROESOPHAGEAL REFLUX DISEASE WITHOUT ESOPHAGITIS: ICD-10-CM

## 2020-08-13 DIAGNOSIS — F33.1 MAJOR DEPRESSIVE DISORDER, RECURRENT EPISODE, MODERATE (H): ICD-10-CM

## 2020-08-13 DIAGNOSIS — Z00.00 ENCOUNTER FOR MEDICARE ANNUAL WELLNESS EXAM: Primary | ICD-10-CM

## 2020-08-13 LAB
ALBUMIN SERPL-MCNC: 3.6 G/DL (ref 3.4–5)
ALP SERPL-CCNC: 90 U/L (ref 40–150)
ALT SERPL W P-5'-P-CCNC: 31 U/L (ref 0–70)
ANION GAP SERPL CALCULATED.3IONS-SCNC: 5 MMOL/L (ref 3–14)
AST SERPL W P-5'-P-CCNC: 18 U/L (ref 0–45)
BILIRUB SERPL-MCNC: 1.2 MG/DL (ref 0.2–1.3)
BUN SERPL-MCNC: 23 MG/DL (ref 7–30)
CALCIUM SERPL-MCNC: 8.5 MG/DL (ref 8.5–10.1)
CHLORIDE SERPL-SCNC: 108 MMOL/L (ref 94–109)
CHOLEST SERPL-MCNC: 190 MG/DL
CO2 SERPL-SCNC: 27 MMOL/L (ref 20–32)
CREAT SERPL-MCNC: 1.25 MG/DL (ref 0.66–1.25)
ERYTHROCYTE [DISTWIDTH] IN BLOOD BY AUTOMATED COUNT: 12.9 % (ref 10–15)
GFR SERPL CREATININE-BSD FRML MDRD: 59 ML/MIN/{1.73_M2}
GLUCOSE SERPL-MCNC: 104 MG/DL (ref 70–99)
HCT VFR BLD AUTO: 46.2 % (ref 40–53)
HDLC SERPL-MCNC: 47 MG/DL
HGB BLD-MCNC: 15.4 G/DL (ref 13.3–17.7)
LDLC SERPL CALC-MCNC: 120 MG/DL
MCH RBC QN AUTO: 29.4 PG (ref 26.5–33)
MCHC RBC AUTO-ENTMCNC: 33.3 G/DL (ref 31.5–36.5)
MCV RBC AUTO: 88 FL (ref 78–100)
NONHDLC SERPL-MCNC: 143 MG/DL
PLATELET # BLD AUTO: 160 10E9/L (ref 150–450)
POTASSIUM SERPL-SCNC: 5.2 MMOL/L (ref 3.4–5.3)
PROT SERPL-MCNC: 6.2 G/DL (ref 6.8–8.8)
PSA SERPL-ACNC: 1.02 UG/L (ref 0–4)
RBC # BLD AUTO: 5.24 10E12/L (ref 4.4–5.9)
SODIUM SERPL-SCNC: 140 MMOL/L (ref 133–144)
TRIGL SERPL-MCNC: 113 MG/DL
WBC # BLD AUTO: 5.5 10E9/L (ref 4–11)

## 2020-08-13 PROCEDURE — 80061 LIPID PANEL: CPT | Performed by: FAMILY MEDICINE

## 2020-08-13 PROCEDURE — 36415 COLL VENOUS BLD VENIPUNCTURE: CPT | Performed by: FAMILY MEDICINE

## 2020-08-13 PROCEDURE — G0103 PSA SCREENING: HCPCS | Performed by: FAMILY MEDICINE

## 2020-08-13 PROCEDURE — 99214 OFFICE O/P EST MOD 30 MIN: CPT | Mod: 25 | Performed by: FAMILY MEDICINE

## 2020-08-13 PROCEDURE — 99397 PER PM REEVAL EST PAT 65+ YR: CPT | Performed by: FAMILY MEDICINE

## 2020-08-13 PROCEDURE — 85027 COMPLETE CBC AUTOMATED: CPT | Performed by: FAMILY MEDICINE

## 2020-08-13 PROCEDURE — 80053 COMPREHEN METABOLIC PANEL: CPT | Performed by: FAMILY MEDICINE

## 2020-08-13 ASSESSMENT — ENCOUNTER SYMPTOMS
EYE PAIN: 0
FREQUENCY: 0
HEADACHES: 0
PALPITATIONS: 0
CHILLS: 0
JOINT SWELLING: 1
ARTHRALGIAS: 1
HEMATURIA: 0
HEARTBURN: 0
NERVOUS/ANXIOUS: 0
ABDOMINAL PAIN: 0
SORE THROAT: 0
CONSTIPATION: 0
WEAKNESS: 1
NAUSEA: 0
SHORTNESS OF BREATH: 1
DIARRHEA: 0
MYALGIAS: 1
DYSURIA: 0
COUGH: 0
FEVER: 0
PARESTHESIAS: 0
HEMATOCHEZIA: 0
DIZZINESS: 1

## 2020-08-13 ASSESSMENT — ACTIVITIES OF DAILY LIVING (ADL): CURRENT_FUNCTION: NO ASSISTANCE NEEDED

## 2020-08-13 ASSESSMENT — MIFFLIN-ST. JEOR: SCORE: 2094.88

## 2020-08-13 NOTE — PROGRESS NOTES
"SUBJECTIVE:   Tad Olivas is a 64 year old male who presents for Preventive Visit.  History htn, bph, depression, high cholesterol and gerd. Had colonoscopy 2017- had  Polyps. No black/bloody stools.  Emotionally doing ok. Outside blood pressure readings ok.  No nausea, vomiting or diarrhea. Exercise  -  Needs more. Some golf and [place]. No  Chest pain or shortness of breath. GERD - no dysphagia. Taking  prilosec every other day and taking MVI and vitaminD.   wellbutrin and prozac daily.  wellbutrin cutting in half. left thumb popping 3-4 weeks ago- still sore and some swelling. right handed. No brace.   Seeing urology for poor urine flow.   Are you in the first 12 months of your Medicare coverage?  No    Healthy Habits:     In general, how would you rate your overall health?  Good    Frequency of exercise:  2-3 days/week    Duration of exercise:  Other    Do you usually eat at least 4 servings of fruit and vegetables a day, include whole grains    & fiber and avoid regularly eating high fat or \"junk\" foods?  No    Taking medications regularly:  Yes    Medication side effects:  Lightheadedness    Ability to successfully perform activities of daily living:  No assistance needed    Home Safety:  No safety concerns identified    Hearing Impairment:  Difficulty following a conversation in a noisy restaurant or crowded room and difficulty following dialogue in the theater    In the past 6 months, have you been bothered by leaking of urine?  No    In general, how would you rate your overall mental or emotional health?  Good      PHQ-2 Total Score: 0    Additional concerns today:  Yes    Do you feel safe in your environment? Yes    Have you ever done Advance Care Planning? (For example, a Health Directive, POLST, or a discussion with a medical provider or your loved ones about your wishes): No, advance care planning information given to patient to review.  Patient declined advance care planning discussion at " this time.      Fall risk  Fallen 2 or more times in the past year?: No  Any fall with injury in the past year?: No    Cognitive Screening   1) Repeat 3 items (Leader, Season, Table)    2) Clock draw: NORMAL  3) 3 item recall: Recalls 3 objects  Results: 3 items recalled: COGNITIVE IMPAIRMENT LESS LIKELY    Mini-CogTM Copyright KENIA Quiroz. Licensed by the author for use in [facility]; reprinted with permission (abi@[email or web address]). All rights reserved.      Do you have sleep apnea, excessive snoring or daytime drowsiness?: yes    Reviewed and updated as needed this visit by clinical staff         Reviewed and updated as needed this visit by Provider        Social History     Tobacco Use     Smoking status: Former Smoker     Last attempt to quit: 1970     Years since quittin.6     Smokeless tobacco: Never Used     Tobacco comment: no 2nd hand smoke exposure   Substance Use Topics     Alcohol use: Yes     Comment: occ.         Alcohol Use 2020   Prescreen: >3 drinks/day or >7 drinks/week? No   Prescreen: >3 drinks/day or >7 drinks/week? -         PROBLEMS TO ADD ON...    Current providers sharing in care for this patient include:   Patient Care Team:  Clement De Jesus MD as PCP - General  Clement De Jesus MD as Assigned PCP  Christiano Leger MD as MD (Urology)  Kellen Barton, RN as Registered Nurse (Urology)  Clement De Jesus MD as Referring Physician (Family Practice)  Janet Dodd PA-C as Physician Assistant (Physician Assistant)    The following health maintenance items are reviewed in Epic and correct as of today:  Health Maintenance   Topic Date Due     ZOSTER IMMUNIZATION (2 of 3) 2017     AORTIC ANEURYSM SCREENING (SYSTEM ASSIGNED)  10/23/2018     LIPID  2020     FALL RISK ASSESSMENT  2020     PNEUMOCOCCAL IMMUNIZATION 65+ LOW/MEDIUM RISK (2 of 2 - PPSV23) 2020     PHQ-9  2020     INFLUENZA VACCINE (1) 2020     MEDICARE ANNUAL  "WELLNESS VISIT  08/13/2021     ADVANCE CARE PLANNING  10/27/2022     COLORECTAL CANCER SCREENING  02/24/2027     DTAP/TDAP/TD IMMUNIZATION (3 - Td) 06/04/2029     HEPATITIS C SCREENING  Completed     DEPRESSION ACTION PLAN  Completed     IPV IMMUNIZATION  Aged Out     MENINGITIS IMMUNIZATION  Aged Out     HEPATITIS B IMMUNIZATION  Aged Out     Labs reviewed in EPIC      Review of Systems   Constitutional: Negative for chills and fever.   HENT: Positive for hearing loss. Negative for congestion, ear pain and sore throat.    Eyes: Negative for pain and visual disturbance.   Respiratory: Positive for shortness of breath. Negative for cough.    Cardiovascular: Negative for chest pain, palpitations and peripheral edema.   Gastrointestinal: Negative for abdominal pain, constipation, diarrhea, heartburn, hematochezia and nausea.   Genitourinary: Positive for impotence. Negative for discharge, dysuria, frequency, genital sores, hematuria and urgency.   Musculoskeletal: Positive for arthralgias, joint swelling and myalgias.   Skin: Negative for rash.   Neurological: Positive for dizziness and weakness. Negative for headaches and paresthesias.   Psychiatric/Behavioral: Negative for mood changes. The patient is not nervous/anxious.      All other ROS.      OBJECTIVE:   There were no vitals taken for this visit. Estimated body mass index is 39.05 kg/m  as calculated from the following:    Height as of 8/6/19: 1.803 m (5' 11\").    Weight as of 8/6/19: 127 kg (280 lb).   /80   Pulse 85   Temp 97  F (36.1  C) (Tympanic)   Ht 1.803 m (5' 11\")   Wt 129.3 kg (285 lb)   SpO2 96%   BMI 39.75 kg/m     Physical Exam  GENERAL: healthy, alert and no distress  EYES: Eyes grossly normal to inspection, PERRL and conjunctivae and sclerae normal  HENT: ear canals and TM's normal, nose and mouth without ulcers or lesions  NECK: no adenopathy, no asymmetry, masses, or scars and thyroid normal to palpation  RESP: lungs clear to " auscultation - no rales, rhonchi or wheezes  BREAST: normal without masses, tenderness or nipple discharge and no palpable axillary masses or adenopathy  CV: regular rate and rhythm, normal S1 S2, no S3 or S4, no murmur, click or rub, no peripheral edema and peripheral pulses strong  ABDOMEN: soft, nontender, no hepatosplenomegaly, no masses and bowel sounds normal   (male): patient deferred /rectal exams. Denies pain/masses/hernia  MS: no gross musculoskeletal defects noted, no edema  MS: some pain with hyperflexion left thumb and large old scar mid left upper arm  SKIN: no suspicious lesions or rashes  NEURO: Normal strength and tone, mentation intact and speech normal  PSYCH: mentation appears normal, affect normal/bright    ASSESSMENT / PLAN:   ASSESSMENT / PLAN:  (Z00.00) Encounter for Medicare annual wellness exam  (primary encounter diagnosis)  Comment: generally healthy and normal exam  Plan: CBC with platelets, Comprehensive metabolic         panel (BMP + Alb, Alk Phos, ALT, AST, Total.         Bili, TP)        Await labs. Repeat colonoscopy per [facility].  Reviewed self mole/testicle check handout.      (E78.5) Hyperlipidemia LDL goal <130  Plan: Comprehensive metabolic panel (BMP + Alb, Alk         Phos, ALT, AST, Total. Bili, TP), Lipid Profile        (Chol, Trig, HDL, LDL calc)        Weight loss.     (I10) Hypertension goal BP (blood pressure) < 140/90  Comment: stable  Plan: Comprehensive metabolic panel (BMP + Alb, Alk         Phos, ALT, AST, Total. Bili, TP)        Continue meds. Chest pain or shortness of breath to er. Recheck in 6 months  Sooner if worse.     (K21.9) Gastroesophageal reflux disease without esophagitis  Comment: stabloe  Plan: CBC with platelets, omeprazole (PRILOSEC) 20 MG        DR capsule        Weight loss. Continue mvi/vitaminD. egd if worse.     (Z12.5) Screening PSA (prostate specific antigen)  Plan: PSA, screen            (E66.01) Morbid obesity (H)  Comment: needs  "help  Plan: continue exercise/wellubtrin and lower carb diet. Recheck in 6 months'  Consider weight loss clinic.     (M25.522) Elbow pain, left  Comment: chronic issues s/p ruptured biceps tendon repair  Plan: OYGI PT, HAND, AND CHIROPRACTIC REFERRAL        Patient would like to see p.t.. back to ortho if worse.     (M79.645) Thumb pain, left  Comment: likely strain  Plan: [facility] PT, HAND, AND CHIROPRACTIC REFERRAL        Can see p.t. if persists vs ortho if worse    (F33.1) Major depressive disorder, recurrent episode, moderate (H)  Comment: stable  Plan: continue meds. If SUICIAL IDEATION OR HOMOCIDAL IDEATION OR YAKOV TO ER. Recheck in 6 months  Sooner if worse. Call/email with questions/concerns., continue exercise        COUNSELING:  Reviewed preventive health counseling, as reflected in patient instructions       Regular exercise       Healthy diet/nutrition       Vision screening       Hearing screening       Dental care       Fall risk prevention       Colon cancer screening       Prostate cancer screening       Osteoporosis Prevention/Bone Health    Estimated body mass index is 39.05 kg/m  as calculated from the following:    Height as of 8/6/19: 1.803 m (5' 11\").    Weight as of 8/6/19: 127 kg (280 lb).    Weight management plan: Discussed healthy diet and exercise guidelines     reports that he quit smoking about 49 years ago. He has never used smokeless tobacco.      Appropriate preventive services were discussed with this patient, including applicable screening as appropriate for cardiovascular disease, diabetes, osteopenia/osteoporosis, and glaucoma.  As appropriate for age/gender, discussed screening for colorectal cancer, prostate cancer, breast cancer, and cervical cancer. Checklist reviewing preventive services available has been given to the patient.    Reviewed patients plan of care and provided an AVS. The Complex Care Plan (for patients with higher acuity and needing more deliberate coordination of " services) for Tad meets the Care Plan requirement. This Care Plan has been established and reviewed with the Patient.    Counseling Resources:  ATP IV Guidelines  Pooled Cohorts Equation Calculator  Breast Cancer Risk Calculator  FRAX Risk Assessment  ICSI Preventive Guidelines  Dietary Guidelines for Americans, 2010  USDA's MyPlate  ASA Prophylaxis  Lung CA Screening    Clement De Jesus MD  [facility]    Identified Health Risks:

## 2020-09-01 ENCOUNTER — THERAPY VISIT (OUTPATIENT)
Dept: PHYSICAL THERAPY | Facility: CLINIC | Age: 65
End: 2020-09-01
Attending: FAMILY MEDICINE
Payer: COMMERCIAL

## 2020-09-01 DIAGNOSIS — M25.522 ELBOW PAIN, LEFT: ICD-10-CM

## 2020-09-01 DIAGNOSIS — M25.511 CHRONIC RIGHT SHOULDER PAIN: ICD-10-CM

## 2020-09-01 DIAGNOSIS — M79.645 THUMB PAIN, LEFT: ICD-10-CM

## 2020-09-01 DIAGNOSIS — G89.29 CHRONIC RIGHT SHOULDER PAIN: ICD-10-CM

## 2020-09-01 DIAGNOSIS — M25.522 LEFT ELBOW PAIN: ICD-10-CM

## 2020-09-01 PROCEDURE — 97161 PT EVAL LOW COMPLEX 20 MIN: CPT | Mod: GP | Performed by: PHYSICAL THERAPIST

## 2020-09-01 PROCEDURE — 97110 THERAPEUTIC EXERCISES: CPT | Mod: GP | Performed by: PHYSICAL THERAPIST

## 2020-09-01 PROCEDURE — 97112 NEUROMUSCULAR REEDUCATION: CPT | Mod: GP | Performed by: PHYSICAL THERAPIST

## 2020-09-01 NOTE — PROGRESS NOTES
"[facility] Initial Evaluation  Subjective:    Patient Health History  Tad Olivas being seen for joint strengthening, thunb pain.     Problem began: 8/13/2020 (MD order).   Problem occurred: unknown   Pain is reported as 5/10 on pain scale.  General health as reported by patient is good.  Pertinent medical history includes: concussions/dizziness, depression, history of fractures, high blood pressure and overweight.   Red flags:  None as reported by patient.  Medical allergies: none.   Surgeries include:  Orthopedic surgery.    Current medications:  Anti-depressants, high blood pressure medication and other. Other medications details: prostrate swelling, urine flow improvment.    Current occupation is retired.   Primary job tasks include:  Computer work, driving, lifting/carrying, prolonged sitting, prolonged standing, pushing/pulling and repetitive tasks.                  Therapist Generated HPI Evaluation  Problem details: He has a history of a R shoulder scope 8 years ago.  He did rehab some, but never got the muscles back perfectly.  He ruptured his distal L biceps tendon and had it repairs 4 years ago.  He never went through PT afterwards for the biceps repair.  He has been much less active since the start of COVID.  He does have some weakness with lifting to shoulder height and above.  He is still golfing and riding his motorcycle.  He has a desire to start a strengthening routine, but is unsure on how to start.  He is also dealing with L thumb pain after he was using a wrench and felt a \"pop\".  The pain is more located in his PIP joint.  His thumb is more painful if he  hard or too much.         Type of problem:  Right shoulder (L elbow).    This is a chronic condition.  Condition occurred with:  Unknown cause.    Site of Pain: PIP joint of L thumb.        Associated symptoms:  Catching and loss of strength. Symptoms are exacerbated by carrying, lifting and using arm overhead " (tight gripping)  and relieved by rest.                              Objective:  System    Physical Exam    General     ROS  Tad Olivas , : 1955, MRN: [id number]    Physical Therapy Objective Findings  Subjective information, goals, clinical impression, daily documentation and other information found in EPISODES tab.  Shoulder Objective Findings  Posture Comments: mild forward head, mild rounded shoulders, mild sway back  Screens:                                                                                            Right                                                        Left                          Cervical (ROM, quadrant) Flex wnl  Ext mild loss  R SB wnl  R rot wnl    Repeated cervical retraction improve R supination strength L SB wnl  L rot mild loss   Thoracic Mobility Hypomobile T1-T4  Rotation 90%   Rotation 90%   TOS (babs, Penny, Yisel, Silva)              Range of Motion:                                             Right AROM          Right PROM            Left AROM             Left PROM           Flexion 160  145    Abduction 132  140    External Rotation 45 in 0 degrees  46 in 0 degrees    Internal Rotation T9 in 0 degrees  T11  in 0 degrees    Other:  Supination  pronation   70  80    60  78      Scapulothoracic Rhythm: Mild medial boarder/inf angle dysfunction L>R    Manual Muscle Testing (graded 0-5, measured at 0 degrees unless otherwise noted):                                                                                                  Right                                                    Left                             Flexion 4+ 4   Abduction 4+ 4+   External Rotation (at 0) 4 4+   Internal Rotation (at 0) 5 5   Extension     Mid Trap 4 4   Lower Trap 4- 4-   Other: bicep  Triceps  Supination  Pronation  Wrist ext  Wrist flex 5  5  4  5  5  5 4+  5  4  5  5  5     (+ mild pain, ++ moderate pain, +++ severe pain)    Flexibility:                                                                                                  Right                                                    Left                             Pec Minor (supine) 4 finger width 4 finger widths   Other     Other       Joint Play                                                                                              Right                                                  Left                            Glenohumeral Mild hypomobile post glide Mild hypomobile post glide   ACJ normal normal   SCJ    1st MCP normal normal    Significant hypomobile for flexion     Palpation:  jaswinder biceps L    Assessment/Plan:    Patient is a 64 year old male with right side shoulder and Left side elbow complaints.    Patient has the following significant findings with corresponding treatment plan.                Diagnosis 1:  R shoulder pain consistent with mild impingement/tendinosis, with C6 n root irriation    Pain -  manual therapy, self management, education and home program  Decreased strength - therapeutic exercise, therapeutic activities and home program  Decreased function - therapeutic activities and home program  Impaired posture - neuro re-education, therapeutic activities and home program  Diagnosis 2:  L elbow weakness following poor recovery from previous distal biceps tendon repair, stressed more due to OA of L thumb     Decreased ROM/flexibility - manual therapy, therapeutic exercise, therapeutic activity and home program  Decreased strength - therapeutic exercise, therapeutic activities and home program  Decreased function - therapeutic activities and home program  Impaired posture - neuro re-education, therapeutic activities and home program    Therapy Evaluation Codes:   1) History comprised of:   Personal factors that impact the plan of care:      Age and Time since onset of symptoms.    Comorbidity factors that impact the plan of care are:      Depression, Heart problems and Overweight.     Medications  impacting care: Anti-depressant and High blood pressure.  2) Examination of Body Systems comprised of:   Body structures and functions that impact the plan of care:      Cervical spine, Elbow, Shoulder and Wrist.   Activity limitations that impact the plan of care are:      Lifting, Sports and Working.  3) Clinical presentation characteristics are:   Stable/Uncomplicated.  4) Decision-Making    Low complexity using standardized patient assessment instrument and/or measureable assessment of functional outcome.  Cumulative Therapy Evaluation is: Low complexity.    Previous and current functional limitations:  (See Goal Flow Sheet for this information)    Short term and Long term goals: (See Goal Flow Sheet for this information)     Communication ability:  Patient appears to be able to clearly communicate and understand verbal and written communication and follow directions correctly.  Treatment Explanation - The following has been discussed with the patient:   RX ordered/plan of care  Anticipated outcomes  Possible risks and side effects  This patient would benefit from PT intervention to resume normal activities.   Rehab potential is good.    Frequency:  1 X week, once daily  Duration:  for 6 weeks  Discharge Plan:  Achieve all LTG.  Independent in home treatment program.  Reach maximal therapeutic benefit.    Please refer to the daily flowsheet for treatment today, total treatment time and time spent performing 1:1 timed codes.     Jagdish Harmon,PT, DPT, OCS

## 2020-09-28 ENCOUNTER — THERAPY VISIT (OUTPATIENT)
Dept: PHYSICAL THERAPY | Facility: CLINIC | Age: 65
End: 2020-09-28
Payer: COMMERCIAL

## 2020-09-28 DIAGNOSIS — G89.29 CHRONIC RIGHT SHOULDER PAIN: ICD-10-CM

## 2020-09-28 DIAGNOSIS — M25.522 LEFT ELBOW PAIN: ICD-10-CM

## 2020-09-28 DIAGNOSIS — M25.511 CHRONIC RIGHT SHOULDER PAIN: ICD-10-CM

## 2020-09-28 PROCEDURE — 97110 THERAPEUTIC EXERCISES: CPT | Mod: GP | Performed by: PHYSICAL THERAPIST

## 2020-09-28 PROCEDURE — 97112 NEUROMUSCULAR REEDUCATION: CPT | Mod: GP | Performed by: PHYSICAL THERAPIST

## 2020-10-04 DIAGNOSIS — E78.5 HYPERLIPIDEMIA LDL GOAL <130: ICD-10-CM

## 2020-10-05 RX ORDER — ATORVASTATIN CALCIUM 80 MG/1
TABLET, FILM COATED ORAL
Qty: 90 TABLET | Refills: 1 | Status: SHIPPED | OUTPATIENT
Start: 2020-10-05 | End: 2021-04-07

## 2020-10-05 NOTE — TELEPHONE ENCOUNTER
Prescription approved per [facility].  Val Guajardo RN

## 2020-10-19 ENCOUNTER — THERAPY VISIT (OUTPATIENT)
Dept: PHYSICAL THERAPY | Facility: CLINIC | Age: 65
End: 2020-10-19
Payer: COMMERCIAL

## 2020-10-19 DIAGNOSIS — M25.511 CHRONIC RIGHT SHOULDER PAIN: ICD-10-CM

## 2020-10-19 DIAGNOSIS — G89.29 CHRONIC RIGHT SHOULDER PAIN: ICD-10-CM

## 2020-10-19 DIAGNOSIS — M25.522 LEFT ELBOW PAIN: ICD-10-CM

## 2020-10-19 PROCEDURE — 97110 THERAPEUTIC EXERCISES: CPT | Mod: GP | Performed by: PHYSICAL THERAPIST

## 2020-10-19 PROCEDURE — 97112 NEUROMUSCULAR REEDUCATION: CPT | Mod: GP | Performed by: PHYSICAL THERAPIST

## 2020-11-02 ENCOUNTER — THERAPY VISIT (OUTPATIENT)
Dept: PHYSICAL THERAPY | Facility: CLINIC | Age: 65
End: 2020-11-02
Payer: COMMERCIAL

## 2020-11-02 DIAGNOSIS — G89.29 CHRONIC RIGHT SHOULDER PAIN: ICD-10-CM

## 2020-11-02 DIAGNOSIS — M25.511 CHRONIC RIGHT SHOULDER PAIN: ICD-10-CM

## 2020-11-02 DIAGNOSIS — M25.522 LEFT ELBOW PAIN: ICD-10-CM

## 2020-11-02 PROCEDURE — 97110 THERAPEUTIC EXERCISES: CPT | Mod: GP | Performed by: PHYSICAL THERAPIST

## 2020-11-02 PROCEDURE — 97112 NEUROMUSCULAR REEDUCATION: CPT | Mod: GP | Performed by: PHYSICAL THERAPIST

## 2020-11-02 NOTE — LETTER
[facility]  [address]  [facility] [id number] [phone]    2020    Re: Tad Olivas   :   1955  MRN:  [id number]   REFERRING PHYSICIAN:   Clement De Jesus    [facility]    Date of Initial Evaluation:  20  Visits: 4 Rxs Used: 4  Reason for Referral:     Left elbow pain  Chronic right shoulder pain    DISCHARGE REPORT    Progress reporting period is from 20 to 20.       SUBJECTIVE    Subjective changes noted by patient:  he is doing well, exercises are going well, he is still really limited with L supination, his arms are feeling much stronger,     Current Pain level: 0/10.     Previous pain level was  NA Initial Pain level: 3/10.   Changes in function:  Yes (See Goal flowsheet attached for changes in current functional level)  Adverse reaction to treatment or activity: None    OBJECTIVE    Changes noted in objective findings:    Objective: MMT: biceps: 5/5 B, triceps: 5/5 B,shoulder abduction: R 5/5 L 5-/5,pronation: R 5/5, L 4+/5, supination: R 5/5, L 4/5, -improved shoulder abduction and L supination with cervical retraction extension wiggle exercise      ASSESSMENT/PLAN    Updated problem list and treatment plan: Diagnosis 1:  R shoulder pain consistent with mild impingement/tendinosis, with C6 n root irriation  Decreased strength - home program  Diagnosis 2:  L elbow weakness following poor recovery from previous distal biceps tendon repair, stressed more due to OA of L thumb   Decreased strength - home program  STG/LTGs have been met or progress has been made towards goals:  Yes (See Goal flow sheet completed today.)  Assessment of Progress: The patient has met all of their long term goals.      Re: Tad Olivas   :   1955  Page 2      Self Management Plans:  Patient has been instructed in a home treatment program.  I have re-evaluated this  patient and find that the nature, scope, duration and intensity of the therapy is appropriate for the medical condition of the patient.  Tad continues to require the following intervention to meet STG and LTG's:  PT intervention is no longer required to meet STG/LTG.    Recommendations:    This patient is ready to be discharged from therapy and continue their home treatment program.      Thank you for your referral.    INQUIRIES    Therapist: Jagdish Harmon PT, [facility]  [facility]  [address] [place] [address] [address]  [facility] [address]  Phone: [phone]  Fax: [phone]

## 2020-11-02 NOTE — PROGRESS NOTES
Subjective:  HPI  Physical Exam                    Objective:  System    Physical Exam    General     ROS    Assessment/Plan:    DISCHARGE REPORT    Progress reporting period is from 9/1/20 to 11/2/20.       SUBJECTIVE  Subjective changes noted by patient:  he is doing well, exercises are going well, he is still really limited with L supination, his arms are feeling much stronger,     Current Pain level: 0/10.     Previous pain level was  NA Initial Pain level: 3/10.   Changes in function:  Yes (See Goal flowsheet attached for changes in current functional level)  Adverse reaction to treatment or activity: None    OBJECTIVE  Changes noted in objective findings:    Objective: MMT: biceps: 5/5 B, triceps: 5/5 B,shoulder abduction: R 5/5 L 5-/5,pronation: R 5/5, L 4+/5, supination: R 5/5, L 4/5, -improved shoulder abduction and L supination with cervical retraction extension wiggle exercise      ASSESSMENT/PLAN  Updated problem list and treatment plan: Diagnosis 1:  R shoulder pain consistent with mild impingement/tendinosis, with C6 n root irriation  Decreased strength - home program  Diagnosis 2:  L elbow weakness following poor recovery from previous distal biceps tendon repair, stressed more due to OA of L thumb   Decreased strength - home program  STG/LTGs have been met or progress has been made towards goals:  Yes (See Goal flow sheet completed today.)  Assessment of Progress: The patient has met all of their long term goals.  Self Management Plans:  Patient has been instructed in a home treatment program.  I have re-evaluated this patient and find that the nature, scope, duration and intensity of the therapy is appropriate for the medical condition of the patient.  Tad continues to require the following intervention to meet STG and LTG's:  PT intervention is no longer required to meet STG/LTG.    Recommendations:  This patient is ready to be discharged from therapy and continue their home treatment  program.    Please refer to the daily flowsheet for treatment today, total treatment time and time spent performing 1:1 timed codes.    Jagdish Harmon PT, OCS

## 2020-12-02 ENCOUNTER — MYC MEDICAL ADVICE (OUTPATIENT)
Dept: FAMILY MEDICINE | Facility: CLINIC | Age: 65
End: 2020-12-02

## 2020-12-02 DIAGNOSIS — Z12.11 COLON CANCER SCREENING: Primary | ICD-10-CM

## 2020-12-03 NOTE — TELEPHONE ENCOUNTER
Pended colonoscopy, please sign order and route back to the team to notify patient.Rae Carranza MA/MARIA D

## 2020-12-13 ENCOUNTER — HEALTH MAINTENANCE LETTER (OUTPATIENT)
Age: 65
End: 2020-12-13

## 2021-02-04 ENCOUNTER — TRANSFERRED RECORDS (OUTPATIENT)
Dept: HEALTH INFORMATION MANAGEMENT | Facility: CLINIC | Age: 66
End: 2021-02-04

## 2021-03-03 DIAGNOSIS — F33.1 MAJOR DEPRESSIVE DISORDER, RECURRENT EPISODE, MODERATE (H): ICD-10-CM

## 2021-03-03 NOTE — LETTER
March 3, 2021    Tad Olivas  [address] [place] [address]    Dear Tad,       We recently received a refill request for FLUoxetine (PROZAC).  We have refilled this for a one time 90 day supply only because you are due for a:    Virtual Video office visit for Mood to continue refills.      Please call at your earliest convenience so that there will not be a delay with your future refills.          Thank you,   Your [facility] Team/[facility]  [phone]

## 2021-03-03 NOTE — TELEPHONE ENCOUNTER
Overdue for PHQ-9/ depression follow-up with provider.     No appointment pending at this time.  Routing to provider to advise.    Gail CORRIGANN, RN

## 2021-03-29 ENCOUNTER — E-VISIT (OUTPATIENT)
Dept: FAMILY MEDICINE | Facility: CLINIC | Age: 66
End: 2021-03-29
Payer: COMMERCIAL

## 2021-03-29 DIAGNOSIS — Z53.9 ERRONEOUS ENCOUNTER--DISREGARD: Primary | ICD-10-CM

## 2021-03-29 NOTE — TELEPHONE ENCOUNTER
I'm so confused about this Evisit and what it's for? If med refill needs a video or office appointment. Clement De Jesus MD

## 2021-04-07 DIAGNOSIS — E78.5 HYPERLIPIDEMIA LDL GOAL <130: ICD-10-CM

## 2021-04-07 RX ORDER — ATORVASTATIN CALCIUM 80 MG/1
TABLET, FILM COATED ORAL
Qty: 90 TABLET | Refills: 1 | Status: SHIPPED | OUTPATIENT
Start: 2021-04-07 | End: 2021-10-19

## 2021-04-13 ENCOUNTER — OFFICE VISIT (OUTPATIENT)
Dept: FAMILY MEDICINE | Facility: CLINIC | Age: 66
End: 2021-04-13
Payer: COMMERCIAL

## 2021-04-13 ENCOUNTER — ANCILLARY PROCEDURE (OUTPATIENT)
Dept: GENERAL RADIOLOGY | Facility: CLINIC | Age: 66
End: 2021-04-13
Attending: FAMILY MEDICINE
Payer: COMMERCIAL

## 2021-04-13 VITALS
HEART RATE: 82 BPM | WEIGHT: 293 LBS | OXYGEN SATURATION: 96 % | SYSTOLIC BLOOD PRESSURE: 146 MMHG | TEMPERATURE: 96.2 F | BODY MASS INDEX: 40.87 KG/M2 | DIASTOLIC BLOOD PRESSURE: 92 MMHG

## 2021-04-13 DIAGNOSIS — M54.9 UPPER BACK PAIN: ICD-10-CM

## 2021-04-13 DIAGNOSIS — M54.9 UPPER BACK PAIN: Primary | ICD-10-CM

## 2021-04-13 PROCEDURE — 72080 X-RAY EXAM THORACOLMB 2/> VW: CPT | Performed by: RADIOLOGY

## 2021-04-13 PROCEDURE — 99214 OFFICE O/P EST MOD 30 MIN: CPT | Performed by: FAMILY MEDICINE

## 2021-04-13 RX ORDER — TIZANIDINE 2 MG/1
2 TABLET ORAL 3 TIMES DAILY PRN
Qty: 30 TABLET | Refills: 1 | Status: SHIPPED | OUTPATIENT
Start: 2021-04-13 | End: 2021-09-22

## 2021-04-13 ASSESSMENT — ANXIETY QUESTIONNAIRES
5. BEING SO RESTLESS THAT IT IS HARD TO SIT STILL: NOT AT ALL
6. BECOMING EASILY ANNOYED OR IRRITABLE: NOT AT ALL
2. NOT BEING ABLE TO STOP OR CONTROL WORRYING: NOT AT ALL
IF YOU CHECKED OFF ANY PROBLEMS ON THIS QUESTIONNAIRE, HOW DIFFICULT HAVE THESE PROBLEMS MADE IT FOR YOU TO DO YOUR WORK, TAKE CARE OF THINGS AT HOME, OR GET ALONG WITH OTHER PEOPLE: NOT DIFFICULT AT ALL
3. WORRYING TOO MUCH ABOUT DIFFERENT THINGS: NOT AT ALL
1. FEELING NERVOUS, ANXIOUS, OR ON EDGE: NOT AT ALL
GAD7 TOTAL SCORE: 0
7. FEELING AFRAID AS IF SOMETHING AWFUL MIGHT HAPPEN: NOT AT ALL

## 2021-04-13 ASSESSMENT — PATIENT HEALTH QUESTIONNAIRE - PHQ9
5. POOR APPETITE OR OVEREATING: NOT AT ALL
SUM OF ALL RESPONSES TO PHQ QUESTIONS 1-9: 0

## 2021-04-13 NOTE — PROGRESS NOTES
SUBJECTIVE:  Tad Olivas, a 65 year old male scheduled an appointment to discuss the following issues:  Upper back pain x 5 weeks. alleve helpful. Hanging sheet rock by self.   No history back issues. Sudden onset of pain. Worse with certain positions.   Sleep ok. alleve prn helpful. No heat.   No radiations into arms. No rashes. No chest pain or shortness of breath.   History arm fracture in past. No nausea, vomiting or diarrhea. No changes with eating.   Medical, social, surgical, and family histories reviewed.    ROS:  All other ROS negatve  OBJECTIVE:  BP (!) 146/92   Pulse 82   Temp 96.2  F (35.7  C) (Tympanic)   Wt 132.9 kg (293 lb)   SpO2 96%   BMI 40.87 kg/m   EXAM:  GENERAL APPEARANCE: healthy, alert and no distress  NECK: no adenopathy, no asymmetry, masses, or scars and thyroid normal to palpation  RESP: lungs clear to auscultation - no rales, rhonchi or wheezes  CV: regular rates and rhythm, normal S1 S2, no S3 or S4 and no murmur, click or rub -  ABDOMEN:  soft, nontender, no HSM or masses and bowel sounds normal  MS: extremities normal- no gross deformities noted, no evidence of inflammation in joints, FROM in all extremities.  MS: tight right rhomboid  SKIN: no suspicious lesions or rashes  NEURO: Normal strength and tone, sensory exam grossly normal, mentation intact and speech normal  PSYCH: mentation appears normal and affect normal/bright    ASSESSMENT / PLAN:  (M54.9) Upper back pain  (primary encounter diagnosis)  Comment: likely rhomboid strain  Plan: XR Thoracic Lumbar Spine 2 Views, tiZANidine         (ZANAFLEX) 2 MG tablet, [facility]         REFERRAL        Heat and stretching. Reveiwed risks and side effects of medication  Expected course and warning signs reviewed.. follow-up with p.t. if not improving in next week. To er if shortness of breath/ LIGHTHEADED or chest pain. Call/email with questions/concerns   Prn alleve- limited with blood pressure. Follow-up blood  pressure/med/labs check in next couple months.     Clement De Jesus MD

## 2021-04-14 ASSESSMENT — ANXIETY QUESTIONNAIRES: GAD7 TOTAL SCORE: 0

## 2021-06-06 DIAGNOSIS — F33.1 MAJOR DEPRESSIVE DISORDER, RECURRENT EPISODE, MODERATE (H): ICD-10-CM

## 2021-07-29 DIAGNOSIS — N40.0 ENLARGED PROSTATE: ICD-10-CM

## 2021-07-29 RX ORDER — TAMSULOSIN HYDROCHLORIDE 0.4 MG/1
0.4 CAPSULE ORAL DAILY
Qty: 90 CAPSULE | Refills: 3 | OUTPATIENT
Start: 2021-07-29

## 2021-07-29 RX ORDER — FINASTERIDE 5 MG/1
1 TABLET, FILM COATED ORAL DAILY
Qty: 90 TABLET | Refills: 3 | OUTPATIENT
Start: 2021-07-29

## 2021-07-29 NOTE — TELEPHONE ENCOUNTER
follow up in 1 year for continued refills. Unless you would like your primary care doctor to continue providing refills, in which case you would not need to see us for a separate visit.  LAST SEEN 7/21/20 * NO FOLLOW UP  RF SENT TO PCP  JYOTSNA MORELOS [facility]

## 2021-08-15 DIAGNOSIS — K21.9 GASTROESOPHAGEAL REFLUX DISEASE WITHOUT ESOPHAGITIS: Primary | ICD-10-CM

## 2021-09-07 DIAGNOSIS — F33.1 MAJOR DEPRESSIVE DISORDER, RECURRENT EPISODE, MODERATE (H): ICD-10-CM

## 2021-09-07 NOTE — LETTER
September 8, 2021    Tad Olivas  [address] [facility] [address]    Dear Tad,       We recently received a refill request for FLUoxetine (PROZAC) 20 MG capsule.  We have refilled this for a one time 90 day supply only because you are due for a:    Wellness office visit      Please call at your earliest convenience so that there will not be a delay with your future refills.          Thank you,   Your [facility] Team/sp  [phone]

## 2021-09-15 ENCOUNTER — TELEPHONE (OUTPATIENT)
Dept: FAMILY MEDICINE | Facility: CLINIC | Age: 66
End: 2021-09-15

## 2021-09-15 ENCOUNTER — VIRTUAL VISIT (OUTPATIENT)
Dept: FAMILY MEDICINE | Facility: CLINIC | Age: 66
End: 2021-09-15
Payer: COMMERCIAL

## 2021-09-15 DIAGNOSIS — R05.9 COUGH: Primary | ICD-10-CM

## 2021-09-15 PROCEDURE — 99213 OFFICE O/P EST LOW 20 MIN: CPT | Mod: 95 | Performed by: PHYSICIAN ASSISTANT

## 2021-09-15 RX ORDER — BENZONATATE 200 MG/1
200 CAPSULE ORAL 3 TIMES DAILY PRN
Qty: 30 CAPSULE | Refills: 0 | Status: SHIPPED | OUTPATIENT
Start: 2021-09-15 | End: 2021-09-15

## 2021-09-15 RX ORDER — BENZONATATE 100 MG/1
100 CAPSULE ORAL 3 TIMES DAILY PRN
Qty: 30 CAPSULE | Refills: 0 | Status: SHIPPED | OUTPATIENT
Start: 2021-09-15 | End: 2021-10-26

## 2021-09-15 NOTE — PROGRESS NOTES
KALYN is a 65 year old who is being evaluated via a billable telephone visit.      What phone number would you like to be contacted at? [phone]  How would you like to obtain your AVS? MyChart    Assessment & Plan       ICD-10-CM    1. Cough  R05 benzonatate (TESSALON) 200 MG capsule     Symptomatic COVID-19 Virus (Coronavirus) by PCR   Warning signs discussed.  side effects discussed  Symptomatic treatment: such as fluids,  OTC acetaminophen and /or non-steroidal anti-inflammatory medication.  Follow up  1-2 wks as needed      Return in about 1 week (around 9/22/2021) for recheck, As Needed.    Sal Atkinson PA-C  [facility]    Subjective   KALYN is a 65 year old who presents for the following health issues     HPI     Acute Illness  Acute illness concerns: cough  Onset/Duration: 4 days   Symptoms:  Fever: had low grade Monday and Tuesday but now resolved   Chills/Sweats: no  Headache (location?): no  Sinus Pressure: no  Conjunctivitis:  no  Ear Pain: no  Rhinorrhea: no  Congestion: YES  Sore Throat: no  Cough: YES- deep cough. Burning in chest.   Wheeze: no  Decreased Appetite: no  Nausea: no  Vomiting: no  Diarrhea: no  Dysuria/Freq.: no  Dysuria or Hematuria: no  Fatigue/Achiness: no  Sick/Strep Exposure: YES- neighbors   Therapies tried and outcome: aleve- helped.       Review of Systems   Constitutional, HEENT, cardiovascular, pulmonary, gi and gu systems are negative, except as otherwise noted.      Objective       Vitals:  No vitals were obtained today due to virtual visit.    Physical Exam   healthy, alert and no distress  PSYCH: Alert and oriented times 3; coherent speech, normal   rate and volume, able to articulate logical thoughts, able   to abstract reason, no tangential thoughts, no hallucinations   or delusions  His affect is normal  RESP: No cough, no audible wheezing, able to talk in full sentences  Remainder of exam unable to be completed due to telephone  visits    COVID testing pending            Phone call duration: 5 minutes

## 2021-09-16 ENCOUNTER — TELEPHONE (OUTPATIENT)
Dept: FAMILY MEDICINE | Facility: CLINIC | Age: 66
End: 2021-09-16

## 2021-09-16 ENCOUNTER — OFFICE VISIT (OUTPATIENT)
Dept: URGENT CARE | Facility: URGENT CARE | Age: 66
End: 2021-09-16
Payer: COMMERCIAL

## 2021-09-16 VITALS
TEMPERATURE: 98 F | HEART RATE: 68 BPM | SYSTOLIC BLOOD PRESSURE: 150 MMHG | OXYGEN SATURATION: 96 % | DIASTOLIC BLOOD PRESSURE: 88 MMHG

## 2021-09-16 DIAGNOSIS — S39.011A STRAIN OF ABDOMINAL MUSCLE, INITIAL ENCOUNTER: Primary | ICD-10-CM

## 2021-09-16 PROCEDURE — 99213 OFFICE O/P EST LOW 20 MIN: CPT | Performed by: NURSE PRACTITIONER

## 2021-09-16 RX ORDER — CODEINE PHOSPHATE AND GUAIFENESIN 10; 100 MG/5ML; MG/5ML
1-2 SOLUTION ORAL EVERY 6 HOURS PRN
Qty: 236 ML | Refills: 0 | Status: SHIPPED | OUTPATIENT
Start: 2021-09-16 | End: 2021-10-26

## 2021-09-16 RX ORDER — CYCLOBENZAPRINE HCL 10 MG
10 TABLET ORAL 3 TIMES DAILY PRN
Qty: 21 TABLET | Refills: 0 | Status: SHIPPED | OUTPATIENT
Start: 2021-09-16 | End: 2021-09-22

## 2021-09-16 NOTE — PATIENT INSTRUCTIONS
Patient Education     Muscle Strain in the Abdomen  A muscle strain is a stretching or tearing of the muscle fibers. It is also called a pulled muscle. The abdomen is protected by a thick wall of muscle in the front and sides. These muscles help with twisting and bending forward. Too much coughing, lifting heavy objects, or sudden jerking movements can sometimes cause a muscle strain in the abdomen. This causes pain that is worse when you move. The area may also feel tender or look swollen and bruised.  Home care    Apply an ice pack over the injured area for 15 to 20 minutes every 3 to 6 hours. Do this for the first 24 to 48 hours. You can make an ice pack by filling a plastic bag that seals at the top with ice cubes and then wrapping it with a thin towel. Be careful not to injure your skin with the ice treatments. Ice should never be applied directly to skin. Continue the use of ice packs for relief of pain and swelling as needed. After 48 hours, apply heat (warm shower or warm bath) for 15 to 20 minutes several times a day, or alternate ice and heat.    You may use over-the-counter pain medicine to control pain, unless another pain medicine was prescribed. If you have liver or kidney disease, a stomach ulcer or gastrointestinal bleeding, talk with your healthcare provider before using these medicines.  Follow-up care  Follow up with your healthcare provider, or as advised.  Call 911  Call 911 if you have:    Weakness, lightheaded, or faint    Chest pain  When to seek medical advice  Call your healthcare provider right away if any of these occur:    Pain gets worse or moves to the right lower abdomen, just below the waistline    Fever of 100.4 F (38 C) or higher, or chills, or as directed by your healthcare provider    Vomiting    Severe abdominal pain that spreads to the back or toward the groin    Blood in the urine    Unexpected vaginal bleeding in women  Estefanía last reviewed this educational content on  5/1/2018 2000-2021 [employer]. All rights reserved. This information is not intended as a substitute for professional medical care. Always follow your healthcare professional's instructions.

## 2021-09-16 NOTE — TELEPHONE ENCOUNTER
Patient calling, had virtual visit yesterday for cough. Cough is not worse but c/o pain in rib cage and abdomen when coughs. Per patient very painful to cough  Patient informed of robitussin w/codeine sent to pharmacy  Instructed patient needs to be seen in clinic, patient to come to urgent care     Anna Marie CORRIGANN, RN

## 2021-09-16 NOTE — TELEPHONE ENCOUNTER
This RN called pharmacy and they said the only alternatives they can see is robitussin w/ codeine or OTC Robitussin DM.    Routing to provider to advise.  Gail Molina BSN, RN

## 2021-09-16 NOTE — TELEPHONE ENCOUNTER
Does pharmacy have any other alternatives.?    over the counter robitussin DM?    Thanks  Sal Atkinson PA-C

## 2021-09-17 ENCOUNTER — LAB (OUTPATIENT)
Dept: URGENT CARE | Facility: URGENT CARE | Age: 66
End: 2021-09-17
Attending: PHYSICIAN ASSISTANT
Payer: COMMERCIAL

## 2021-09-17 DIAGNOSIS — R05.9 COUGH: ICD-10-CM

## 2021-09-17 PROCEDURE — U0003 INFECTIOUS AGENT DETECTION BY NUCLEIC ACID (DNA OR RNA); SEVERE ACUTE RESPIRATORY SYNDROME CORONAVIRUS 2 (SARS-COV-2) (CORONAVIRUS DISEASE [COVID-19]), AMPLIFIED PROBE TECHNIQUE, MAKING USE OF HIGH THROUGHPUT TECHNOLOGIES AS DESCRIBED BY CMS-2020-01-R: HCPCS

## 2021-09-17 PROCEDURE — U0005 INFEC AGEN DETEC AMPLI PROBE: HCPCS

## 2021-09-18 LAB — SARS-COV-2 RNA RESP QL NAA+PROBE: NEGATIVE

## 2021-09-20 ENCOUNTER — E-VISIT (OUTPATIENT)
Dept: URGENT CARE | Facility: CLINIC | Age: 66
End: 2021-09-20
Payer: COMMERCIAL

## 2021-09-20 DIAGNOSIS — R05.9 COUGH: Primary | ICD-10-CM

## 2021-09-20 PROCEDURE — 99207 PR NON-BILLABLE SERV PER CHARTING: CPT

## 2021-09-20 RX ORDER — AZITHROMYCIN 250 MG/1
TABLET, FILM COATED ORAL
Qty: 6 TABLET | Refills: 0 | Status: SHIPPED | OUTPATIENT
Start: 2021-09-20 | End: 2021-10-26

## 2021-09-21 ENCOUNTER — VIRTUAL VISIT (OUTPATIENT)
Dept: FAMILY MEDICINE | Facility: CLINIC | Age: 66
End: 2021-09-21
Payer: COMMERCIAL

## 2021-09-21 ENCOUNTER — MYC MEDICAL ADVICE (OUTPATIENT)
Dept: FAMILY MEDICINE | Facility: CLINIC | Age: 66
End: 2021-09-21

## 2021-09-21 DIAGNOSIS — R05.9 COUGH: Primary | ICD-10-CM

## 2021-09-21 DIAGNOSIS — R06.2 WHEEZING: ICD-10-CM

## 2021-09-21 PROCEDURE — 99213 OFFICE O/P EST LOW 20 MIN: CPT | Mod: 95 | Performed by: FAMILY MEDICINE

## 2021-09-21 RX ORDER — ALBUTEROL SULFATE 90 UG/1
2 AEROSOL, METERED RESPIRATORY (INHALATION) EVERY 6 HOURS
Qty: 8 G | Refills: 1 | Status: SHIPPED | OUTPATIENT
Start: 2021-09-21 | End: 2022-06-24

## 2021-09-21 NOTE — PROGRESS NOTES
KALYN is a 65 year old who is being evaluated via a billable video visit.    Follow-up cough. Seen via video visit and given prescription tessalon perles and negative covid. History bronchitis/sinusitis.   Sick past 9-10 days. covid swab 5 days. Some wheezing - overall improving. Albuterol in distinct past. Non-smoker. gerd stable. No dysphagia. Cough from lungs - not post-nasal. Dry cough. right sided more. No history PE. No leg swelling. No prolonged immobilization.  Still fatigued. Tessalon perles not covered. No chest pain. Outside blood pressure ok.   No issues with zpak.   How would you like to obtain your AVS? MyChart  If the video visit is dropped, the invitation should be resent by: Text to cell phone: [phone]  Will anyone else be joining your video visit? No    Video Start Time: 2:18 PM    ASSESSMENT / PLAN:  (R05) Cough  (primary encounter diagnosis)  Comment: likely improving bronchitis  Plan: albuterol (PROAIR HFA/PROVENTIL HFA/VENTOLIN         HFA) 108 (90 Base) MCG/ACT inhaler        Reveiwed risks and side effects of medication  Over the counter mucinex and finish zpak. Expected course and warning signs reviewed.. xray if persist. To er if worsening shortness of breath/ chest pain or fatigue.     (R06.2) Wheezing  Comment: megan from bronchitis  Plan: albuterol (PROAIR HFA/PROVENTIL HFA/VENTOLIN         HFA) 108 (90 Base) MCG/ACT inhaler        Consider allergist or short course of prednisone.       Subjective   KALYN is a 65 year old who presents for the following health issues     HPI     F/u Cough, chest congestion, body ache - has not improve since last week, would like an antibiotic       Review of Systems   All other ROS negative.       Objective           Vitals:  No vitals were obtained today due to virtual visit.    Physical Exam   GENERAL: Healthy, alert and no distress  EYES: Eyes grossly normal to inspection.  No discharge or erythema, or obvious scleral/conjunctival  abnormalities.  RESP: No audible wheeze, cough, or visible cyanosis.  No visible retractions or increased work of breathing.    SKIN: Visible skin clear. No significant rash, abnormal pigmentation or lesions.  NEURO: Cranial nerves grossly intact.  Mentation and speech appropriate for age.  PSYCH: Mentation appears normal, affect normal/bright, judgement and insight intact, normal speech and appearance well-groomed.        Video-Visit Details    Type of service:  Video Visit    Video End Time:2:30 PM    Originating Location (pt. Location): Home    Distant Location (provider location):  [facility]     Platform used for Video Visit: [place]

## 2021-09-22 ENCOUNTER — OFFICE VISIT (OUTPATIENT)
Dept: FAMILY MEDICINE | Facility: CLINIC | Age: 66
End: 2021-09-22
Payer: COMMERCIAL

## 2021-09-22 ENCOUNTER — NURSE TRIAGE (OUTPATIENT)
Dept: FAMILY MEDICINE | Facility: CLINIC | Age: 66
End: 2021-09-22

## 2021-09-22 VITALS
WEIGHT: 289 LBS | HEIGHT: 71 IN | SYSTOLIC BLOOD PRESSURE: 106 MMHG | BODY MASS INDEX: 40.46 KG/M2 | HEART RATE: 98 BPM | DIASTOLIC BLOOD PRESSURE: 63 MMHG | TEMPERATURE: 98.2 F | OXYGEN SATURATION: 97 % | RESPIRATION RATE: 17 BRPM

## 2021-09-22 DIAGNOSIS — T14.8XXA BRUISING: Primary | ICD-10-CM

## 2021-09-22 DIAGNOSIS — R05.9 COUGH: ICD-10-CM

## 2021-09-22 LAB
ALBUMIN SERPL-MCNC: 3.4 G/DL (ref 3.4–5)
ALP SERPL-CCNC: 89 U/L (ref 40–150)
ALT SERPL W P-5'-P-CCNC: 27 U/L (ref 0–70)
ANION GAP SERPL CALCULATED.3IONS-SCNC: 5 MMOL/L (ref 3–14)
APTT PPP: 31 SECONDS (ref 22–38)
AST SERPL W P-5'-P-CCNC: 17 U/L (ref 0–45)
BILIRUB SERPL-MCNC: 1.3 MG/DL (ref 0.2–1.3)
BUN SERPL-MCNC: 24 MG/DL (ref 7–30)
CALCIUM SERPL-MCNC: 8.6 MG/DL (ref 8.5–10.1)
CHLORIDE BLD-SCNC: 109 MMOL/L (ref 94–109)
CO2 SERPL-SCNC: 27 MMOL/L (ref 20–32)
CREAT SERPL-MCNC: 1.23 MG/DL (ref 0.66–1.25)
ERYTHROCYTE [DISTWIDTH] IN BLOOD BY AUTOMATED COUNT: 12.6 % (ref 10–15)
GFR SERPL CREATININE-BSD FRML MDRD: 60 ML/MIN/1.73M2
GLUCOSE BLD-MCNC: 109 MG/DL (ref 70–99)
HCT VFR BLD AUTO: 41 % (ref 40–53)
HGB BLD-MCNC: 13.6 G/DL (ref 13.3–17.7)
INR PPP: 1.01 (ref 0.85–1.15)
MCH RBC QN AUTO: 28.8 PG (ref 26.5–33)
MCHC RBC AUTO-ENTMCNC: 33.2 G/DL (ref 31.5–36.5)
MCV RBC AUTO: 87 FL (ref 78–100)
PLATELET # BLD AUTO: 214 10E3/UL (ref 150–450)
POTASSIUM BLD-SCNC: 4.7 MMOL/L (ref 3.4–5.3)
PROT SERPL-MCNC: 6.4 G/DL (ref 6.8–8.8)
RBC # BLD AUTO: 4.73 10E6/UL (ref 4.4–5.9)
SODIUM SERPL-SCNC: 141 MMOL/L (ref 133–144)
WBC # BLD AUTO: 6.3 10E3/UL (ref 4–11)

## 2021-09-22 PROCEDURE — 85027 COMPLETE CBC AUTOMATED: CPT | Performed by: FAMILY MEDICINE

## 2021-09-22 PROCEDURE — 80053 COMPREHEN METABOLIC PANEL: CPT | Performed by: FAMILY MEDICINE

## 2021-09-22 PROCEDURE — 99214 OFFICE O/P EST MOD 30 MIN: CPT | Performed by: FAMILY MEDICINE

## 2021-09-22 PROCEDURE — 85730 THROMBOPLASTIN TIME PARTIAL: CPT | Performed by: FAMILY MEDICINE

## 2021-09-22 PROCEDURE — 36415 COLL VENOUS BLD VENIPUNCTURE: CPT | Performed by: FAMILY MEDICINE

## 2021-09-22 PROCEDURE — 85610 PROTHROMBIN TIME: CPT | Performed by: FAMILY MEDICINE

## 2021-09-22 ASSESSMENT — PAIN SCALES - GENERAL: PAINLEVEL: NO PAIN (0)

## 2021-09-22 ASSESSMENT — MIFFLIN-ST. JEOR: SCORE: 2108.03

## 2021-09-22 NOTE — PROGRESS NOTES
"    Assessment & Plan     Bruising  No pain over area of bruising but does have pain in mid to upper right back and has been having intense coughing  Suspect he tore tissue in upper back with all the coughing and bleeding now presenting down lower  Will check labs and have him continue to monitor bruising  - **CBC with platelets FUTURE 2mo; Future  - INR; Future  - Partial thromboplastin time; Future  - **Comprehensive metabolic panel FUTURE 2mo; Future  - **CBC with platelets FUTURE 2mo  - INR  - Partial thromboplastin time  - **Comprehensive metabolic panel FUTURE 2mo    Coughing:  Due to viral URI. Is improving.          BMI:   Estimated body mass index is 40.31 kg/m  as calculated from the following:    Height as of this encounter: 1.803 m (5' 11\").    Weight as of this encounter: 131.1 kg (289 lb).   Weight management plan: Discussed healthy diet and exercise guidelines        No follow-ups on file.    Sachi Duff MD  [facility]    Amee MCCAIN is a 65 year old who presents for the following health issues     HPI     Large bruise on right side. No injury, No pain. Noticed last night     Pt with large area of bruising over right side of abdomen  No pain.  Noticed it last night. Was not there yesterday morning  No other rashes , no other symptoms.   He is recovering from a bad viral URI and he was doing a lot of coughing and was having some pain in his mid to upper right back  Still coughing but it is improving  He is taking some amoxicillin of a leftover prescription    He does take fish oil, baby aspirin and then takes aleve daily        Review of Systems   Constitutional, HEENT, cardiovascular, pulmonary, gi and gu systems are negative, except as otherwise noted.      Objective    /63   Pulse 98   Temp 98.2  F (36.8  C) (Oral)   Resp 17   Ht 1.803 m (5' 11\")   Wt 131.1 kg (289 lb)   SpO2 97%   BMI 40.31 kg/m    Body mass index is 40.31 kg/m .  Physical Exam "   GENERAL: healthy, alert and no distress  RESP: lungs clear to auscultation - no rales, rhonchi or wheezes, does have pain to palpation over mid to upper right back  CV: regular rate and rhythm, normal S1 S2, no S3 or S4, no murmur, click or rub, no peripheral edema and peripheral pulses strong  ABDOMEN: soft, nontender, no hepatosplenomegaly, no masses and bowel sounds normal, large dark purple bruise noted over right abdomen extending from just below ribs to tip of pelvis. No palpable hematoma and there is no pain to palpation over this area    Results for orders placed or performed in visit on 09/22/21 (from the past 24 hour(s))   **CBC with platelets FUTURE 2mo   Result Value Ref Range    WBC Count 6.3 4.0 - 11.0 10e3/uL    RBC Count 4.73 4.40 - 5.90 10e6/uL    Hemoglobin 13.6 13.3 - 17.7 g/dL    Hematocrit 41.0 40.0 - 53.0 %    MCV 87 78 - 100 fL    MCH 28.8 26.5 - 33.0 pg    MCHC 33.2 31.5 - 36.5 g/dL    RDW 12.6 10.0 - 15.0 %    Platelet Count 214 150 - 450 10e3/uL

## 2021-09-22 NOTE — TELEPHONE ENCOUNTER
"    Additional Information    Negative: Shock suspected (e.g., cold/pale/clammy skin, too weak to stand, low BP, rapid pulse)    Negative: Sounds like a life-threatening emergency to the triager    Negative: Bruises with fever    Negative: Tiny bruises (spots or dots) of unknown cause    Negative: Bruise(s) of forehead or head    Negative: Bruise(s) of face or jaw    Negative: Followed an injury, and triager doesn't know which injury guideline to use first    Negative: Post-operative bruising    Negative: Dizziness or lightheadedness    Negative: [1] Bruise on head/face, chest, or abdomen AND [2]  taking Coumadin (warfarin) or other strong blood thinner, or known bleeding disorder (e.g., thrombocytopenia)    Negative: Unexplained bleeding from another site (e.g., gums, nose, urine) as well    Negative: Patient sounds very sick or weak to the triager    Negative: [1] Not caused by an injury AND [2] 5 or more bruises now    Negative: [1] Raised bruise AND [2] size > 2 inches (5 cm) AND [3] getting bigger    Negative: [1] SEVERE pain AND [2] not improved 2 hours after pain medicine/ice packs    Negative: Suspicious history for the injury    Negative: Taking Coumadin (warfarin) or other strong blood thinner, or known bleeding disorder (e.g., thrombocytopenia)    [1] Not caused by an injury AND [2] < 5 unexplained bruises    Answer Assessment - Initial Assessment Questions  1. APPEARANCE of BRUISE: \"Describe the bruise.\"       Purple from under right armpit to into right hip several inches wide. Looks like it is collecting at belt line.  2. SIZE: \"How large is the bruise?\"       See avove  3. NUMBER: \"How many bruises are there?\"       One area  4. LOCATION: \"Where is the bruise located?\"       See above  5. ONSET: \"How long ago did the bruise occur?\"       Noticed about 10 pm last night, he noticed when he took off his shirt for bed.  No pain  6. CAUSE: \"Tell me how it happened.\"      Unknown.  Coughing a lot, hard " "coughs, rib cage hurts   7. MEDICAL HISTORY: \"Do you have any medical problems that can cause easy bruising or bleeding?\" (e.g., leukemia, liver disease, recent chemotherapy)      none  8. MEDICATIONS : \"Do you take any medications which thin the blood such as: aspirin, heparin, ibuprofen (NSAIDS), Plavix, or Coumadin?\"      On baby asa daily  9. OTHER SYMPTOMS: \"Do you have any other symptoms?\"  (e.g., weakness, dizziness, pain, fever, nosebleed, blood in urine/stool)      See ov note from yesterday has bronchitis.   10. PREGNANCY: \"Is there any chance you are pregnant?\" \"When was your last menstrual period?\"        Not applicable.    Protocols used: BRUISES-A-AH      "

## 2021-09-26 ENCOUNTER — HEALTH MAINTENANCE LETTER (OUTPATIENT)
Age: 66
End: 2021-09-26

## 2021-09-28 ENCOUNTER — PRE VISIT (OUTPATIENT)
Dept: UROLOGY | Facility: CLINIC | Age: 66
End: 2021-09-28

## 2021-09-28 NOTE — TELEPHONE ENCOUNTER
Follow up- slow urinary stream/erectile dysfunction.  Records available in [facility].

## 2021-10-13 DIAGNOSIS — N40.0 ENLARGED PROSTATE: ICD-10-CM

## 2021-10-13 RX ORDER — FINASTERIDE 5 MG/1
1 TABLET, FILM COATED ORAL DAILY
Qty: 30 TABLET | Refills: 0 | Status: SHIPPED | OUTPATIENT
Start: 2021-10-13 | End: 2021-10-26

## 2021-10-13 RX ORDER — TAMSULOSIN HYDROCHLORIDE 0.4 MG/1
0.4 CAPSULE ORAL DAILY
Qty: 30 CAPSULE | Refills: 0 | Status: SHIPPED | OUTPATIENT
Start: 2021-10-13 | End: 2021-10-19

## 2021-10-14 ENCOUNTER — MYC MEDICAL ADVICE (OUTPATIENT)
Dept: FAMILY MEDICINE | Facility: CLINIC | Age: 66
End: 2021-10-14

## 2021-10-14 NOTE — TELEPHONE ENCOUNTER
Will need office appointment to listen to lungs/xray/etc.  Urgent care if worse Clement De Jesus MD

## 2021-10-19 ENCOUNTER — OFFICE VISIT (OUTPATIENT)
Dept: FAMILY MEDICINE | Facility: CLINIC | Age: 66
End: 2021-10-19
Payer: COMMERCIAL

## 2021-10-19 ENCOUNTER — ANCILLARY PROCEDURE (OUTPATIENT)
Dept: GENERAL RADIOLOGY | Facility: CLINIC | Age: 66
End: 2021-10-19
Attending: FAMILY MEDICINE
Payer: COMMERCIAL

## 2021-10-19 VITALS
SYSTOLIC BLOOD PRESSURE: 133 MMHG | TEMPERATURE: 97.3 F | HEART RATE: 91 BPM | WEIGHT: 289 LBS | OXYGEN SATURATION: 95 % | BODY MASS INDEX: 40.31 KG/M2 | DIASTOLIC BLOOD PRESSURE: 77 MMHG

## 2021-10-19 DIAGNOSIS — F33.1 MAJOR DEPRESSIVE DISORDER, RECURRENT EPISODE, MODERATE (H): ICD-10-CM

## 2021-10-19 DIAGNOSIS — R05.9 COUGH: ICD-10-CM

## 2021-10-19 DIAGNOSIS — I10 HYPERTENSION GOAL BP (BLOOD PRESSURE) < 140/90: ICD-10-CM

## 2021-10-19 DIAGNOSIS — Z12.5 SCREENING PSA (PROSTATE SPECIFIC ANTIGEN): ICD-10-CM

## 2021-10-19 DIAGNOSIS — Z00.00 ENCOUNTER FOR MEDICARE ANNUAL WELLNESS EXAM: Primary | ICD-10-CM

## 2021-10-19 DIAGNOSIS — N40.0 ENLARGED PROSTATE: ICD-10-CM

## 2021-10-19 DIAGNOSIS — K21.9 GASTROESOPHAGEAL REFLUX DISEASE WITHOUT ESOPHAGITIS: ICD-10-CM

## 2021-10-19 DIAGNOSIS — E78.5 HYPERLIPIDEMIA LDL GOAL <130: ICD-10-CM

## 2021-10-19 PROCEDURE — 71046 X-RAY EXAM CHEST 2 VIEWS: CPT | Performed by: RADIOLOGY

## 2021-10-19 PROCEDURE — 99214 OFFICE O/P EST MOD 30 MIN: CPT | Mod: 25 | Performed by: FAMILY MEDICINE

## 2021-10-19 PROCEDURE — G0438 PPPS, INITIAL VISIT: HCPCS | Performed by: FAMILY MEDICINE

## 2021-10-19 RX ORDER — ATORVASTATIN CALCIUM 80 MG/1
TABLET, FILM COATED ORAL
Qty: 90 TABLET | Refills: 3 | Status: SHIPPED | OUTPATIENT
Start: 2021-10-19 | End: 2022-11-03

## 2021-10-19 RX ORDER — AMLODIPINE BESYLATE 5 MG/1
TABLET ORAL
Qty: 90 TABLET | Refills: 1 | Status: SHIPPED | OUTPATIENT
Start: 2021-10-19 | End: 2022-11-03

## 2021-10-19 RX ORDER — TAMSULOSIN HYDROCHLORIDE 0.4 MG/1
0.4 CAPSULE ORAL DAILY
Qty: 90 CAPSULE | Refills: 1 | Status: SHIPPED | OUTPATIENT
Start: 2021-10-19 | End: 2021-10-26

## 2021-10-19 RX ORDER — BUPROPION HYDROCHLORIDE 100 MG/1
TABLET, EXTENDED RELEASE ORAL
Qty: 90 TABLET | Refills: 0 | Status: SHIPPED | OUTPATIENT
Start: 2021-10-19 | End: 2022-09-29

## 2021-10-19 ASSESSMENT — PATIENT HEALTH QUESTIONNAIRE - PHQ9
SUM OF ALL RESPONSES TO PHQ QUESTIONS 1-9: 6
5. POOR APPETITE OR OVEREATING: NOT AT ALL

## 2021-10-19 ASSESSMENT — ANXIETY QUESTIONNAIRES
IF YOU CHECKED OFF ANY PROBLEMS ON THIS QUESTIONNAIRE, HOW DIFFICULT HAVE THESE PROBLEMS MADE IT FOR YOU TO DO YOUR WORK, TAKE CARE OF THINGS AT HOME, OR GET ALONG WITH OTHER PEOPLE: SOMEWHAT DIFFICULT
6. BECOMING EASILY ANNOYED OR IRRITABLE: NOT AT ALL
2. NOT BEING ABLE TO STOP OR CONTROL WORRYING: SEVERAL DAYS
GAD7 TOTAL SCORE: 2
7. FEELING AFRAID AS IF SOMETHING AWFUL MIGHT HAPPEN: NOT AT ALL
3. WORRYING TOO MUCH ABOUT DIFFERENT THINGS: SEVERAL DAYS
5. BEING SO RESTLESS THAT IT IS HARD TO SIT STILL: NOT AT ALL
1. FEELING NERVOUS, ANXIOUS, OR ON EDGE: NOT AT ALL

## 2021-10-19 NOTE — PATIENT INSTRUCTIONS
Patient Education   Personalized Prevention Plan  You are due for the preventive services outlined below.  Your care team is available to assist you in scheduling these services.  If you have already completed any of these items, please share that information with your care team to update in your medical record.  Health Maintenance Due   Topic Date Due     ANNUAL REVIEW OF HM ORDERS  Never done     AORTIC ANEURYSM SCREENING (SYSTEM ASSIGNED)  10/23/2018     Annual Wellness Visit  08/13/2021     Cholesterol Lab  08/13/2021     Depression Assessment  10/13/2021

## 2021-10-19 NOTE — PROGRESS NOTES
"  SUBJECTIVE:   Tad Olivas is a 65 year old male who presents for Preventive Visit.    Ongoing cough. Negative covid. Normal cbc. Given zpak and albuterol. History bronchitis.   History htn, bph, depression, high cholesterol, morbid obesity, ed and gerd.  Had some bruising from cough right flank/side improving.   Taking asa - stopped.   Cough  Slightly better. No fevers or chills. Non-smoker. Some right lower abdominal pain/flank -improving. No history dvt/pe in past.  No nausea, vomiting or diarrhea or black/bloody stools. No urine changes or hematuria.   Smell/taste ok. No sinus congestion.   Energy level improving. Emotionally doing. Sleep overall o k.   gerd  Every other day prilosec - stable. Taking  MVI/D.   Albuterol prn was a little helpful.   Memory  Ok. No falls.   Denies testicle pain/masses/hernia.   Patient has been advised of split billing requirements and indicates understanding: Yes  Are you in the first 12 months of your Medicare Part B coverage?  No    Physical Health:  In general, how would you rate your overall physical health? fair  Outside of work, how many days during the week do you exercise? 1 day/week  Outside of work, approximately how many minutes a day do you exercise?15-30 minutes  If you drink alcohol do you typically have >3 drinks per day or >7 drinks per week? No  Do you usually eat at least 4 servings of fruit and vegetables a day, include whole grains & fiber and avoid regularly eating high fat or \"junk\" foods? NO  Do you have any problems taking medications regularly?  No  Do you have any side effects from medications? none  Needs assistance for the following daily activities: no assistance needed  Which of the following safety concerns are present in your home?  none identified   Hearing impairment: No  In the past 6 months, have you been bothered by leaking of urine? no    Mental Health:  In general, how would you rate your overall mental or emotional health? fair  PHQ-2 " Score:      Do you feel safe in your environment? Yes    Have you ever done Advance Care Planning? (For example, a Health Directive, POLST, or a discussion with a medical provider or your loved ones about your wishes): Yes, patient states has an Advance Care Planning document and will bring a copy to the clinic.    Additional concerns to address?  YES, recheck cough. listen to lungs    Fall risk:  Fallen 2 or more times in the past year?: No  Any fall with injury in the past year?: No    Cognitive Screenin) Repeat 3 items (Leader, Season, Table)    2) Clock draw: NORMAL  3) 3 item recall: Recalls 3 objects  Results: 3 items recalled: COGNITIVE IMPAIRMENT LESS LIKELY    Mini-CogTM Copyright S Richie. Licensed by the author for use in [facility]; reprinted with permission (abi@[facility]). All rights reserved.      Do you have sleep apnea, excessive snoring or daytime drowsiness?: no          Reviewed and updated as needed this visit by clinical staff                 Reviewed and updated as needed this visit by Provider                Social History     Tobacco Use     Smoking status: Former Smoker     Quit date: 1970     Years since quittin.8     Smokeless tobacco: Never Used     Tobacco comment: no 2nd hand smoke exposure   Substance Use Topics     Alcohol use: Yes     Comment: occ.                           Current providers sharing in care for this patient include:   Patient Care Team:  Cleemnt De Jesus MD as PCP - General  Clement De Jesus MD as Assigned PCP  Christiano Leger MD as MD (Urology)  Clement De Jesus MD as Referring Physician (Family Practice)  Janet Dodd PA-C as Physician Assistant (Physician Assistant)  Janet Dodd PA-C as Assigned Surgical Provider    The following health maintenance items are reviewed in Epic and correct as of today:  Health Maintenance   Topic Date Due     ANNUAL REVIEW OF HM ORDERS  Never done     AORTIC ANEURYSM  SCREENING (SYSTEM ASSIGNED)  10/23/2018     MEDICARE ANNUAL WELLNESS VISIT  08/13/2021     LIPID  08/13/2021     PHQ-9  10/13/2021     ZOSTER IMMUNIZATION (3 of 3) 11/30/2021     FALL RISK ASSESSMENT  09/15/2022     ADVANCE CARE PLANNING  08/13/2025     DTAP/TDAP/TD IMMUNIZATION (3 - Td or Tdap) 06/04/2029     COLORECTAL CANCER SCREENING  02/04/2031     HEPATITIS C SCREENING  Completed     DEPRESSION ACTION PLAN  Completed     INFLUENZA VACCINE  Completed     Pneumococcal Vaccine: 65+ Years  Completed     COVID-19 Vaccine  Completed     IPV IMMUNIZATION  Aged Out     MENINGITIS IMMUNIZATION  Aged Out     HEPATITIS B IMMUNIZATION  Aged Out     Lab work is in process      ROS:  All other ROS negative    OBJECTIVE:   /77   Pulse 91   Temp 97.3  F (36.3  C) (Tympanic)   Wt 131.1 kg (289 lb)   SpO2 95%   BMI 40.31 kg/m   EXAM:   GENERAL: healthy, alert and no distress  EYES: Eyes grossly normal to inspection, PERRL and conjunctivae and sclerae normal  HENT: ear canals and TM's normal, nose and mouth without ulcers or lesions  NECK: no adenopathy, no asymmetry, masses, or scars and thyroid normal to palpation  RESP: lungs clear to auscultation - no rales, rhonchi or wheezes  BREAST: normal without masses, tenderness or nipple discharge and no palpable axillary masses or adenopathy  CV: regular rate and rhythm, normal S1 S2, no S3 or S4, no murmur, click or rub, no peripheral edema and peripheral pulses strong  ABDOMEN: soft, nontender, no hepatosplenomegaly, no masses and bowel sounds normal   (male): patient deferred /rectal exams  MS: no gross musculoskeletal defects noted, no edema  SKIN: no suspicious lesions or rashes  NEURO: Normal strength and tone, mentation intact and speech normal  PSYCH: mentation appears normal, affect normal/bright  LYMPH: no cervical, supraclavicular, axillary adenopathy    ASSESSMENT / PLAN:  (Z00.00) Encounter for Medicare annual wellness exam  (primary encounter  "diagnosis)  Comment: generally healthy and normal exam  Plan: Reviewed self mole/testicle check handout.  VitaminD. Follow-up eye exam    (R05.9) Cough  Comment: resolving. likley bronchitis. ?pleural effusion  Plan: XR Chest 2 Views        Await rad report. Continue prn albuterol.     (E78.5) Hyperlipidemia LDL goal <130  Comment: stable in past  Plan: atorvastatin (LIPITOR) 80 MG tablet, Lipid         Profile        Diet/exercise. Chest pain or shortness of breath to er.     (F33.1) Major depressive disorder, recurrent episode, moderate (H)  Comment: stable  Plan: buPROPion (WELLBUTRIN SR) 100 MG 12 hr tablet        If SUICIAL IDEATION OR HOMOCIDAL IDEATION OR YAKOV TO ER. Exercise. Recheck in 6 months  Sooner if worse.     (K21.9) Gastroesophageal reflux disease without esophagitis  Comment: stable  Plan: omeprazole (PRILOSEC) 20 MG DR capsule        Prn.     (N40.0) Enlarged prostate  Comment: stable  Plan: tamsulosin (FLOMAX) 0.4 MG capsule        Urology if wrose    (I10) Hypertension goal BP (blood pressure) < 140/90  Comment: stable  Plan: amLODIPine (NORVASC) 5 MG tablet        Self-monitor. Chest pain or shortness of breath to er. Recheck in 6 months  Sooner if worse    (Z12.5) Screening PSA (prostate specific antigen)    Plan: Prostate Specific Antigen Screen                Patient has been advised of split billing requirements and indicates understanding: Yes    COUNSELING:  Reviewed preventive health counseling, as reflected in patient instructions       Regular exercise       Healthy diet/nutrition       Vision screening       Hearing screening       Dental care       Bladder control       Aspirin prophylaxis        Alcohol Use        Colon cancer screening       Prostate cancer screening    Estimated body mass index is 40.31 kg/m  as calculated from the following:    Height as of 9/22/21: 1.803 m (5' 11\").    Weight as of 9/22/21: 131.1 kg (289 lb).    Weight management plan: Discussed healthy diet " and exercise guidelines    He reports that he quit smoking about 50 years ago. He has never used smokeless tobacco.    Appropriate preventive services were discussed with this patient, including applicable screening as appropriate for cardiovascular disease, diabetes, osteopenia/osteoporosis, and glaucoma.  As appropriate for age/gender, discussed screening for colorectal cancer, prostate cancer, breast cancer, and cervical cancer. Checklist reviewing preventive services available has been given to the patient.    Reviewed patients plan of care and provided an AVS. The Intermediate Care Plan ( asthma action plan, low back pain action plan, and migraine action plan) for Tad meets the Care Plan requirement. This Care Plan has been established and reviewed with the Patient.    Counseling Resources:  ATP IV Guidelines  Pooled Cohorts Equation Calculator  Breast Cancer Risk Calculator  BRCA-Related Cancer Risk Assessment: FHS-7 Tool  FRAX Risk Assessment  ICSI Preventive Guidelines  Dietary Guidelines for Americans, 2010  USDA's MyPlate  ASA Prophylaxis  Lung CA Screening    Clement De Jesus MD  [facility]

## 2021-10-20 ASSESSMENT — ANXIETY QUESTIONNAIRES: GAD7 TOTAL SCORE: 2

## 2021-10-26 ENCOUNTER — OFFICE VISIT (OUTPATIENT)
Dept: UROLOGY | Facility: CLINIC | Age: 66
End: 2021-10-26
Payer: COMMERCIAL

## 2021-10-26 VITALS
BODY MASS INDEX: 41.02 KG/M2 | DIASTOLIC BLOOD PRESSURE: 82 MMHG | HEART RATE: 86 BPM | HEIGHT: 71 IN | SYSTOLIC BLOOD PRESSURE: 131 MMHG | WEIGHT: 293 LBS

## 2021-10-26 DIAGNOSIS — N52.9 ERECTILE DYSFUNCTION, UNSPECIFIED ERECTILE DYSFUNCTION TYPE: ICD-10-CM

## 2021-10-26 DIAGNOSIS — R39.198 SLOW URINARY STREAM: Primary | ICD-10-CM

## 2021-10-26 PROCEDURE — 99213 OFFICE O/P EST LOW 20 MIN: CPT | Performed by: PHYSICIAN ASSISTANT

## 2021-10-26 RX ORDER — TAMSULOSIN HYDROCHLORIDE 0.4 MG/1
0.4 CAPSULE ORAL DAILY
Qty: 90 CAPSULE | Refills: 3 | Status: SHIPPED | OUTPATIENT
Start: 2021-10-26 | End: 2022-06-24

## 2021-10-26 RX ORDER — FINASTERIDE 5 MG/1
1 TABLET, FILM COATED ORAL DAILY
Qty: 90 TABLET | Refills: 3 | Status: SHIPPED | OUTPATIENT
Start: 2021-10-26 | End: 2022-06-24

## 2021-10-26 ASSESSMENT — PAIN SCALES - GENERAL: PAINLEVEL: NO PAIN (0)

## 2021-10-26 ASSESSMENT — MIFFLIN-ST. JEOR: SCORE: 2121.17

## 2021-10-26 NOTE — PROGRESS NOTES
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 Symptoms stable with tamsulosin and finasteride. Will continue with the same. Also with ED with minimal response to PDE-5 inhibitor. Discussed alternative treatment options including intracavernosal injection therapy or IPP. He would like to take some time to think about his options.    -Continue tamsulosin to 0.4 mg once daily at bedtime. Refilled.  -Continue finasteride 5 mg daily. Refilled.  -Continue sildenafil 100 mg PRN. If no improvement, will consider ICI vs IPP as a next step.     Otherwise, follow up in 1 year, sooner as needed.    Janet Dodd PA-C      HPI: Tad Olivas is a very pleasant 66 year old male who is 5 years s/p buccal graft dorsal onlay urethroplasty for a history of bulbar and penile urethral strictures (surgeon: Dr. Leger). Last cystoscopy with Dr. Leger on 10/30/17 which revealed no evidence for stricture recurrence. He continued to complain of slow flow post-operatively so was referred for urodynamics testing, performed on 5/29/19. Results summarized as follows:     -Normal bladder capacity (325 mL) with normal filling sensations.  -Normal bladder compliance without DO/DOI.  -No SERGE.  -Fair detrusor contraction during voiding to max Pdet 13 cm H2O.  -Reduced flow rate (Qmax 8.3 mL/s) with a continuous flow curve and complete bladder emptying (final PVR 0 mL).  -No evidence for DSD or bladder outlet obstruction.  -Fluoroscopy reveals a mildly trabeculated bladder wall without diverticuli or VUR. Bladder neck is closed during filling and open during voiding with some possible narrowing noted within the prostatic urethra.    Based on these results, his slow flow was felt secondary to hypocontractile detrusor rather  "than outlet obstruction. He was offered to start CIC, but deferred as he was emptying well. He has continued to take tamsulosin 0.4 mg daily (higher dose caused symptomatic hypotension) and finasteride 5 mg daily. He can tell a difference when he forgets to take these medications as his flow will be noticeably worse.     Today, urinary symptoms are stable. Needs refills of both medications. He tried the higher dose of sildenafil prescribed at his last visit which helped \"a little.\"     PEx  /82   Pulse 86   Ht 1.803 m (5' 11\")   Wt 132.9 kg (293 lb)   BMI 40.87 kg/m    GENERAL: Healthy, alert and no distress  EYES: Eyes grossly normal to inspection.  No discharge or erythema, or obvious scleral/conjunctival abnormalities.  RESP: No audible wheeze, cough, or visible cyanosis.  No visible retractions or increased work of breathing.    SKIN: Visible skin clear. No significant rash, abnormal pigmentation or lesions.  NEURO: Cranial nerves grossly intact.  Mentation and speech appropriate for age.  PSYCH: Mentation appears normal, affect normal/bright, judgement and insight intact, normal speech and appearance well-groomed.    LABS:  PSA   Date Value Ref Range Status   08/13/2020 1.02 0 - 4 ug/L Final     Comment:     Assay Method:  Chemiluminescence using Siemens Vista analyzer     Creatinine   Date Value Ref Range Status   09/22/2021 1.23 0.66 - 1.25 mg/dL Final   08/13/2020 1.25 0.66 - 1.25 mg/dL Final       23 minutes spent on the date of the encounter doing chart review, patient visit and documentation        "

## 2021-10-26 NOTE — NURSING NOTE
"Chief Complaint   Patient presents with     RECHECK     symptom check       Blood pressure 131/82, pulse 86, height 1.803 m (5' 11\"), weight 132.9 kg (293 lb). Body mass index is 40.87 kg/m .    Patient Active Problem List   Diagnosis     Biceps tendon tear     HYPERLIPIDEMIA LDL GOAL <130     Elbow pain     Moderate major depression (H)     Erectile dysfunction     Gross hematuria     Urethritis     Advanced directives, counseling/discussion     Renal cysts, acquired, bilateral     Decreased urine stream     Depression     Major depressive disorder, recurrent episode, moderate (H)     Urethral stricture     Hypertension goal BP (blood pressure) < 140/90     History of urethral stricture     Gastroesophageal reflux disease without esophagitis     Morbid obesity (H)       No Known Allergies    Current Outpatient Medications   Medication Sig Dispense Refill     albuterol (PROAIR HFA/PROVENTIL HFA/VENTOLIN HFA) 108 (90 Base) MCG/ACT inhaler Inhale 2 puffs into the lungs every 6 hours 8 g 1     amLODIPine (NORVASC) 5 MG tablet TAKE ONE TABLET BY MOUTH ONCE DAILY 90 tablet 1     amoxicillin (AMOXIL) 500 MG capsule Take 4 capsules (2,000 mg) by mouth daily For one dose prior to dental procedures 4 capsule 0     ascorbic acid (VITAMIN C) 500 MG tablet Take 1 tablet by mouth daily. 100 tablet 3     atorvastatin (LIPITOR) 80 MG tablet TAKE ONE TABLET BY MOUTH ONCE DAILY FOR CHOLESTEROL 90 tablet 3     buPROPion (WELLBUTRIN SR) 100 MG 12 hr tablet TAKE ONE TABLET BY MOUTH EVERY MORNING FOR ENERGY/ WEIGHT LOSS/ DEPRESSION 90 tablet 0     Cholecalciferol (VITAMIN D3 PO) Take 2,000 Units by mouth daily       finasteride (PROSCAR) 5 MG tablet Take 1 tablet (5 mg) by mouth daily 30 tablet 0     FLUoxetine (PROZAC) 20 MG capsule TAKE ONE CAPSULE BY MOUTH ONCE DAILY 90 capsule 0     Glucosamine-Chondroit-Vit C-Mn (GLUCOSAMINE-CHONDROITINOITIN) CAPS Take 1 each by mouth daily. 90 capsule 3     Multiple vitamin TABS Take 1 tablet by " mouth daily. 90 tablet 3     Naproxen Sodium (ALEVE PO) Take 220 mg by mouth daily        omeprazole (PRILOSEC) 20 MG DR capsule TAKE 1 CAPSULE BY MOUTH EVERY OTHER DAY 30 TO 60 MINUTES BEFORE A MEAL TO PROTECT STOMACH AND HEARTBURN 45 capsule 3     sildenafil (VIAGRA) 50 MG tablet Take 1-2 tablets ( mg) by mouth as needed (30-60 minutes prior to sexual activity) 30 tablet 3     tamsulosin (FLOMAX) 0.4 MG capsule Take 1 capsule (0.4 mg) by mouth daily 90 capsule 1     aspirin 81 MG tablet Take 81 mg by mouth daily        azithromycin (ZITHROMAX) 250 MG tablet Two tablets first day, then one tablet daily for four days. 6 tablet 0     benzonatate (TESSALON) 100 MG capsule Take 1 capsule (100 mg) by mouth 3 times daily as needed for cough 30 capsule 0     guaiFENesin-codeine (ROBITUSSIN AC) 100-10 MG/5ML solution Take 5-10 mLs by mouth every 6 hours as needed for cough 236 mL 0       Social History     Tobacco Use     Smoking status: Former Smoker     Quit date: 1970     Years since quittin.8     Smokeless tobacco: Never Used     Tobacco comment: no 2nd hand smoke exposure   Vaping Use     Vaping Use: Never used   Substance Use Topics     Alcohol use: Yes     Comment: occ.     Drug use: No       Liliam Haney CMA  10/26/2021  12:38 PM

## 2021-10-26 NOTE — LETTER
10/26/2021       RE: Tad lOivas  [address]   [place] [address]     Dear Colleague,    Thank you for referring your patient, Tad Olivas, to the [facility] UROLOGY CLIN[facility] [place] at [facility]. Please see a copy of my visit note below.    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 Symptoms stable with tamsulosin and finasteride. Will continue with the same. Also with ED with minimal response to PDE-5 inhibitor. Discussed alternative treatment options including intracavernosal injection therapy or IPP. He would like to take some time to think about his options.    -Continue tamsulosin to 0.4 mg once daily at bedtime. Refilled.  -Continue finasteride 5 mg daily. Refilled.  -Continue sildenafil 100 mg PRN. If no improvement, will consider ICI vs IPP as a next step.     Otherwise, follow up in 1 year, sooner as needed.    Janet Dodd PA-C      HPI: Tad Olivas is a very pleasant 66 year old male who is 5 years s/p buccal graft dorsal onlay urethroplasty for a history of bulbar and penile urethral strictures (surgeon: Dr. Leger). Last cystoscopy with Dr. Leger on 10/30/17 which revealed no evidence for stricture recurrence. He continued to complain of slow flow post-operatively so was referred for urodynamics testing, performed on 5/29/19. Results summarized as follows:     -Normal bladder capacity (325 mL) with normal filling sensations.  -Normal bladder compliance without DO/DOI.  -No SERGE.  -Fair detrusor contraction during voiding to max Pdet 13 cm H2O.  -Reduced flow rate (Qmax 8.3 mL/s) with a continuous flow curve and complete bladder emptying (final PVR 0 mL).  -No evidence for DSD or bladder outlet  "obstruction.  -Fluoroscopy reveals a mildly trabeculated bladder wall without diverticuli or VUR. Bladder neck is closed during filling and open during voiding with some possible narrowing noted within the prostatic urethra.    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 He was offered to start CIC, but deferred as he was emptying well. He has continued to take tamsulosin 0.4 mg daily (higher dose caused symptomatic hypotension) and finasteride 5 mg daily. He can tell a difference when he forgets to take these medications as his flow will be noticeably worse.     Today, urinary symptoms are stable. Needs refills of both medications. He tried the higher dose of sildenafil prescribed at his last visit which helped \"a little.\"     PEx  /82   Pulse 86   Ht 1.803 m (5' 11\")   Wt 132.9 kg (293 lb)   BMI 40.87 kg/m    GENERAL: Healthy, alert and no distress  EYES: Eyes grossly normal to inspection.  No discharge or erythema, or obvious scleral/conjunctival abnormalities.  RESP: No audible wheeze, cough, or visible cyanosis.  No visible retractions or increased work of breathing.    SKIN: Visible skin clear. No significant rash, abnormal pigmentation or lesions.  NEURO: Cranial nerves grossly intact.  Mentation and speech appropriate for age.  PSYCH: Mentation appears normal, affect normal/bright, judgement and insight intact, normal speech and appearance well-groomed.    LABS:  PSA   Date Value Ref Range Status   08/13/2020 1.02 0 - 4 ug/L Final     Comment:     Assay Method:  Chemiluminescence using Siemens Vista analyzer     Creatinine   Date Value Ref Range Status   09/22/2021 1.23 0.66 - 1.25 mg/dL Final   08/13/2020 1.25 0.66 - 1.25 mg/dL Final       23 minutes spent on the date of the encounter doing chart review, patient visit and documentation      "

## 2021-11-12 ENCOUNTER — LAB (OUTPATIENT)
Dept: LAB | Facility: CLINIC | Age: 66
End: 2021-11-12
Payer: COMMERCIAL

## 2021-11-12 DIAGNOSIS — Z12.5 SCREENING PSA (PROSTATE SPECIFIC ANTIGEN): ICD-10-CM

## 2021-11-12 DIAGNOSIS — E78.5 HYPERLIPIDEMIA LDL GOAL <130: ICD-10-CM

## 2021-11-12 LAB
CHOLEST SERPL-MCNC: 223 MG/DL
FASTING STATUS PATIENT QL REPORTED: YES
HDLC SERPL-MCNC: 41 MG/DL
LDLC SERPL CALC-MCNC: 149 MG/DL
NONHDLC SERPL-MCNC: 182 MG/DL
PSA SERPL-MCNC: 0.98 UG/L (ref 0–4)
TRIGL SERPL-MCNC: 164 MG/DL

## 2021-11-12 PROCEDURE — G0103 PSA SCREENING: HCPCS

## 2021-11-12 PROCEDURE — 36415 COLL VENOUS BLD VENIPUNCTURE: CPT

## 2021-11-12 PROCEDURE — 80061 LIPID PANEL: CPT

## 2021-11-18 ENCOUNTER — TELEPHONE (OUTPATIENT)
Dept: FAMILY MEDICINE | Facility: CLINIC | Age: 66
End: 2021-11-18
Payer: COMMERCIAL

## 2021-11-18 NOTE — TELEPHONE ENCOUNTER
.Reason for Call:  Form, our goal is to have forms completed with 72 hours, however, some forms may require a visit or additional information.    Type of letter, form or note:  medical    Who is the form from?: Patient    Where did the form come from: Patient or family brought in       What clinic location was the form placed at?: [place]    Where the form was placed: Given to MA/RN    What number is listed as a contact on the form?:        Additional comments: Evaluation form for [facility]    Call taken on 11/18/2021 at 10:39 AM by Carlie Mills CNA

## 2021-11-18 NOTE — TELEPHONE ENCOUNTER
I brought the completed and signed [employer] form up to the [place]  and it is ready for .  I spoke to the patient and let him know it is ready for .  Pao Rizzo,  [facility]

## 2021-11-19 ENCOUNTER — TELEPHONE (OUTPATIENT)
Dept: FAMILY MEDICINE | Facility: CLINIC | Age: 66
End: 2021-11-19
Payer: COMMERCIAL

## 2021-12-04 DIAGNOSIS — F33.1 MAJOR DEPRESSIVE DISORDER, RECURRENT EPISODE, MODERATE (H): ICD-10-CM

## 2021-12-06 NOTE — TELEPHONE ENCOUNTER
"Routing refill request to provider for review/approval because:  Requested Prescriptions   Pending Prescriptions Disp Refills    FLUoxetine (PROZAC) 20 MG capsule [Pharmacy Med Name: FLUOXETINE HCL 20MG CAPS] 90 capsule 0     Sig: TAKE ONE CAPSULE BY MOUTH ONCE DAILY        SSRIs Protocol Failed - 12/4/2021  1:07 AM        Failed - PHQ-9 score less than 5 in past 6 months     Please review last PHQ-9 score.           Passed - Medication is active on med list        Passed - Patient is age 18 or older        Passed - Recent (6 mo) or future (30 days) visit within the authorizing provider's specialty     Patient had office visit in the last 6 months or has a visit in the next 30 days with authorizing provider or within the authorizing provider's specialty.  See \"Patient Info\" tab in inbasket, or \"Choose Columns\" in Meds & Orders section of the refill encounter.                PHQ 1/31/2020 4/13/2021 10/19/2021   PHQ-9 Total Score 0 0 6   Q9: Thoughts of better off dead/self-harm past 2 weeks Not at all Not at all Not at all         Anna Marie CORRIGANN, RN        " [facility]  Report of Consultation    Isidro Grant Patient Status:  Inpatient    8/3/1938 MRN [id number]   [facility] 3NE-A Attending Tena Litten, DO   Hosp Day # 21 PCP Lexi Asher MD     Reason for Consultation:  Sigmoid volvu Ana Jovel MD at [facility] N/A 3/4/2019    Performed by Ana Jovel MD at [facility] 3/2/2019    Performed by Kenji Galeano MD at [address] [facility] 1,000 mg, 1,000 mg, Oral, Q6H PRN  •  ipratropium-albuterol (DUONEB) nebulizer solution 3 mL, 3 mL, Nebulization, Q4H PRN  •  racepinephrine HCl (S-2) nebulizer solution 0.5 mL, 0.5 mL, Nebulization, Q4H PRN  •  benzonatate (TESSALON) cap 100 mg, 100 mg, O or excessive bleeding    Physical Exam:  Blood pressure 106/58, pulse 58, temperature 98.3 °F (36.8 °C), temperature source Oral, resp. rate 18, height 6' 5\" (1.956 m), weight 251 lb 1.7 oz (113.9 kg), SpO2 92 %. General: Alert, orientated x3.   Sangita Middleton

## 2022-01-31 ENCOUNTER — OFFICE VISIT (OUTPATIENT)
Dept: FAMILY MEDICINE | Facility: CLINIC | Age: 67
End: 2022-01-31
Payer: COMMERCIAL

## 2022-01-31 ENCOUNTER — MYC MEDICAL ADVICE (OUTPATIENT)
Dept: FAMILY MEDICINE | Facility: CLINIC | Age: 67
End: 2022-01-31

## 2022-01-31 VITALS
HEART RATE: 67 BPM | TEMPERATURE: 97.2 F | OXYGEN SATURATION: 96 % | RESPIRATION RATE: 18 BRPM | SYSTOLIC BLOOD PRESSURE: 142 MMHG | BODY MASS INDEX: 41.3 KG/M2 | DIASTOLIC BLOOD PRESSURE: 74 MMHG | HEIGHT: 71 IN | WEIGHT: 295 LBS

## 2022-01-31 DIAGNOSIS — R20.0 NUMBNESS OF RIGHT HAND: Primary | ICD-10-CM

## 2022-01-31 PROCEDURE — 99213 OFFICE O/P EST LOW 20 MIN: CPT | Performed by: FAMILY MEDICINE

## 2022-01-31 ASSESSMENT — PAIN SCALES - GENERAL: PAINLEVEL: NO PAIN (0)

## 2022-01-31 ASSESSMENT — MIFFLIN-ST. JEOR: SCORE: 2130.24

## 2022-01-31 NOTE — PROGRESS NOTES
SUBJECTIVE:  Tad Olivas is a 66 year old male who is seen for right hand numbness.he reports that it primarily affects the 5th and 4th fingers.  He reports some mild pain in his arm and shoulder/   He denies upper extremity(ies) weakness.         The patient relates no prior history of problems with his neck.  The patient reports that he is employed as a salesman and does not have a physical job that demands use of his neck. He has been doing some drywall recently   The patients past medical, surgical, social and family histories were reviewed.    [facility] with it.         Social History     Socioeconomic History     Marital status: Single     Spouse name: None     Number of children: None     Years of education: None     Highest education level: None   Occupational History     None   Tobacco Use     Smoking status: Former Smoker     Quit date: 1970     Years since quittin.1     Smokeless tobacco: Never Used     Tobacco comment: no 2nd hand smoke exposure   Vaping Use     Vaping Use: Never used   Substance and Sexual Activity     Alcohol use: Yes     Comment: occ.     Drug use: No     Sexual activity: Not Currently     Partners: Female   Other Topics Concern     Parent/sibling w/ CABG, MI or angioplasty before 65F 55M? Yes   Social History Narrative     None     Social Determinants of Health     Financial Resource Strain: Not on file   Food Insecurity: Not on file   Transportation Needs: Not on file   Physical Activity: Not on file   Stress: Not on file   Social Connections: Not on file   Intimate Partner Violence: Not on file   Housing Stability: Not on file     Past Medical History:   Diagnosis Date     Biceps rupture, distal      Degenerative joint disease     right knee OA     Depressive disorder      Elevated cholesterol      Erectile dysfunction 2010     Hypertension      Hypertension goal BP (blood pressure) < 140/90      Moderate major depression (H) 2010     Renal  cysts, acquired, bilateral 9/26/2012     Current Outpatient Medications   Medication Sig Dispense Refill     albuterol (PROAIR HFA/PROVENTIL HFA/VENTOLIN HFA) 108 (90 Base) MCG/ACT inhaler Inhale 2 puffs into the lungs every 6 hours 8 g 1     amLODIPine (NORVASC) 5 MG tablet TAKE ONE TABLET BY MOUTH ONCE DAILY 90 tablet 1     amoxicillin (AMOXIL) 500 MG capsule Take 4 capsules (2,000 mg) by mouth daily For one dose prior to dental procedures 4 capsule 0     ascorbic acid (VITAMIN C) 500 MG tablet Take 1 tablet by mouth daily. 100 tablet 3     atorvastatin (LIPITOR) 80 MG tablet TAKE ONE TABLET BY MOUTH ONCE DAILY FOR CHOLESTEROL 90 tablet 3     buPROPion (WELLBUTRIN SR) 100 MG 12 hr tablet TAKE ONE TABLET BY MOUTH EVERY MORNING FOR ENERGY/ WEIGHT LOSS/ DEPRESSION 90 tablet 0     Cholecalciferol (VITAMIN D3 PO) Take 2,000 Units by mouth daily       finasteride (PROSCAR) 5 MG tablet Take 1 tablet (5 mg) by mouth daily 90 tablet 3     FLUoxetine (PROZAC) 20 MG capsule TAKE ONE CAPSULE BY MOUTH ONCE DAILY 90 capsule 1     Glucosamine-Chondroit-Vit C-Mn (GLUCOSAMINE-CHONDROITINOITIN) CAPS Take 1 each by mouth daily. 90 capsule 3     Multiple vitamin TABS Take 1 tablet by mouth daily. 90 tablet 3     Naproxen Sodium (ALEVE PO) Take 220 mg by mouth daily        omeprazole (PRILOSEC) 20 MG DR capsule TAKE 1 CAPSULE BY MOUTH EVERY OTHER DAY 30 TO 60 MINUTES BEFORE A MEAL TO PROTECT STOMACH AND HEARTBURN 45 capsule 3     sildenafil (VIAGRA) 50 MG tablet Take 1-2 tablets ( mg) by mouth as needed (30-60 minutes prior to sexual activity) 30 tablet 3     tamsulosin (FLOMAX) 0.4 MG capsule Take 1 capsule (0.4 mg) by mouth daily 90 capsule 3       REVIEW OF SYSTEMS:  CONSTITUTIONAL:NEGATIVE for fever, chills, change in weight  INTEGUMENTARY/SKIN: NEGATIVE for worrisome rashes, moles or lesions  MUSCULOSKELETAL:See HPI above        Allergies as of 01/31/2022     (No Known Allergies)     Current Outpatient Medications  "  Medication     albuterol (PROAIR HFA/PROVENTIL HFA/VENTOLIN HFA) 108 (90 Base) MCG/ACT inhaler     amLODIPine (NORVASC) 5 MG tablet     amoxicillin (AMOXIL) 500 MG capsule     ascorbic acid (VITAMIN C) 500 MG tablet     atorvastatin (LIPITOR) 80 MG tablet     buPROPion (WELLBUTRIN SR) 100 MG 12 hr tablet     Cholecalciferol (VITAMIN D3 PO)     finasteride (PROSCAR) 5 MG tablet     FLUoxetine (PROZAC) 20 MG capsule     Glucosamine-Chondroit-Vit C-Mn (GLUCOSAMINE-CHONDROITINOITIN) CAPS     Multiple vitamin TABS     Naproxen Sodium (ALEVE PO)     omeprazole (PRILOSEC) 20 MG DR capsule     sildenafil (VIAGRA) 50 MG tablet     tamsulosin (FLOMAX) 0.4 MG capsule     No current facility-administered medications for this visit.         Examination:  BP (!) 142/74   Pulse 67   Temp 97.2  F (36.2  C) (Tympanic)   Resp 18   Ht 1.803 m (5' 11\")   Wt 133.8 kg (295 lb)   SpO2 96%   BMI 41.14 kg/m    General: healthy, alert and no distress.  PSYCH:  mentation appears normal and affect normal/bright    Neurological exam of the upper extremities   DTR's    Strength was +5 globally in the upper extremities except right finger abduction       Musculoskeletal:  Inspection: normal cervical lordosis  There was no tenderness to palpation of the insertion of the paraspinal muscles on the right occipital area, upper cervical vertebrae, lower cervical vertebrae, right paraspinal muscles in the upper cervical region, right paraspinal muscles in the lower cervical region and right trapezius muscle.    Spurling's manuever was negative bilaterally.  There was decreased active range of motion in neck flexion: Mild, extension: Mild, rotation: Mild and lateral bending: Mild.    Tinels sign was negative at the ulnar groove, the tunnel of guyen and the carpal tunnel         ASSESSMENT/IMPRESSION:  (R20.0) Numbness of right hand  (primary encounter diagnosis)  Comment:   Plan: EMG              "

## 2022-01-31 NOTE — NURSING NOTE
"Chief Complaint   Patient presents with     Numbness     right hand and fingers, x 2 weeks,       Initial BP (!) 142/74   Pulse 67   Temp 97.2  F (36.2  C) (Tympanic)   Resp 18   Ht 1.803 m (5' 11\")   Wt 133.8 kg (295 lb)   SpO2 96%   BMI 41.14 kg/m   Estimated body mass index is 41.14 kg/m  as calculated from the following:    Height as of this encounter: 1.803 m (5' 11\").    Weight as of this encounter: 133.8 kg (295 lb).  Medication Reconciliation: complete  Swati Pennington, MISBAH  "

## 2022-01-31 NOTE — TELEPHONE ENCOUNTER
Patient/parent is informed of MD note below, as it is written. Verbalized good understanding.  Next 5 appointments (look out 90 days)    Jan 31, 2022  2:40 PM  (Arrive by 2:20 PM)  Provider Visit with Reuben Zapata MD  [facility] [address] [facility] [address]  [phone]        Val Guajardo RN

## 2022-03-28 ENCOUNTER — OFFICE VISIT (OUTPATIENT)
Dept: NEUROLOGY | Facility: CLINIC | Age: 67
End: 2022-03-28
Attending: FAMILY MEDICINE
Payer: COMMERCIAL

## 2022-03-28 DIAGNOSIS — R20.0 NUMBNESS OF RIGHT HAND: ICD-10-CM

## 2022-03-28 PROCEDURE — 95909 NRV CNDJ TST 5-6 STUDIES: CPT | Performed by: PSYCHIATRY & NEUROLOGY

## 2022-03-28 PROCEDURE — 95886 MUSC TEST DONE W/N TEST COMP: CPT | Performed by: PSYCHIATRY & NEUROLOGY

## 2022-03-28 NOTE — RESULT ENCOUNTER NOTE
Tad,  I have reviewed the report of the EMG that we recently ordered. The results show both carpal tunnel syndrome Compression of the median nerve at the wrist) but also ulnar nerve problems at the elbow.   The first thing I would recommend would be to avoid any pressure on the elbow by wearing an elbow pad all the time.  If this is not helpful I would likely have you follow-up with neurology for further evaluation. Let me know if you have questions.  Sincerely,   Reuben Zapata MD

## 2022-03-28 NOTE — PROGRESS NOTES
[facility]   EMG Laboratory      Nerve Conduction & EMG Report          Patient:       Tad Olivas  Patient ID:    [id number]  Gender:        Male  YOB: 1955  Age:           66 Years 5 Months      History and Examination:  Tad Olivas is a 66 year old man with numbness in the right hand. Symptoms developed abruptly and in association with numbness in the forearm but without neck pain; since then symptoms have fluctuated but are overall improved. He indicated to his referring provider that symptoms were in the fourth and fifth digits; however today he is less specific, and suggests that they are prominent in the third (middle) and fourth (ring) fingers. Examination demonstrates normal bulk and strength. He is referred for evaluation of suspected entrapment neuropathy.     Techniques:  Motor conduction studies were done with surface recording electrodes. Sensory conduction studies were performed with surface electrodes, unless indicated otherwise by (n), designating the use of subdermal recording electrodes. Temperature was monitored and recorded throughout the study. Upper extremities were maintained at a temperature of 32 degrees Centigrade or higher.  EMG was done with a concentric needle electrode.     Results:  Right median sensory and mixed nerve conduction studies demonstrated severe attenuation of amplitude and slowing of conduction, accentuated in the transcarpal segment. Right ulnar sensory and mixed nerve conduction studies were normal. The right radial sensory nerve action potential was mildly attenuated. Right median motor conduction studies demonstrated moderate prolongation of distal latency. Right ulnar motor conduction studies demonstrated mild to moderate conduction slowing across the elbow when recording from abductor digiti minimi but not first dorsal interosseous. Electromyography was normal.    Interpretation:  This is an abnormal study, demonstrating  electrophysiologic evidence of the followin. 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   2. Probable mild right ulnar neuropathy at the elbow.  3. Mild radial sensory neuropathy; this may reflect a mononeuropathy or a sensory polyneuropathy. If clinically indicated, further nerve conduction studies could be performed to clarify.  4. No evidence of right cervical radiculopathy.    Jarret Alicea MD        Sensory NCS      Nerve / Sites Rec. Site Onset Peak Ref. NP Amp Ref. PP Amp Dist Maurice Ref. Temp     ms ms ms  V  V  V cm m/s m/s  C   R MEDIAN - Dig II Anti      Wrist Dig II 4.32 5.73  2.8 10.0 4.4 14 32.4 48.0 33.3   R ULNAR - Dig V Anti      Wrist Dig V 2.08 2.71  11.1 8.0 20.4 12.5 60.0 48.0 33.4   R RADIAL - Snuff      Forearm Snuff 2.34 3.02 3.00 10.5  10.2 12.5 53.3  32.9   R MEDIAN - Ulnar - Palmar      Median Wrist 3.23 3.85 2.40 1.1  4.1 8 24.8  33.9      Ulnar Wrist 1.25 1.77 2.40 5.7  8.0 8 64.0  33.6       Motor NCS      Nerve / Sites Rec. Site Lat Ref. Amp Ref. Rel Amp Dist Maurice Ref. Dur. Area Temp.     ms ms mV mV % cm m/s m/s ms %  C   R MEDIAN - APB      Wrist APB 6.82 4.40 6.2 5.0 100 8   6.20 100 33.5      Elbow APB 11.46  5.8  93.8 24.5 52.9 48.0 6.41 110 32.4   R ULNAR - ADM      Wrist ADM 2.76 3.50 9.1 5.0 100 8   6.04 100 33.3      B.Elbow ADM 6.41  7.2  79.3 23 63.1 48.0 6.15 91 33.3      A.Elbow ADM 8.65  6.9  75.1 10 44.7 48.0 6.20 83.1 33.3      B.Elbow ADM 6.09  5.6  61.2   48.0 5.94 63 33.4      A.Elbow ADM 8.02  5.3  57.6 7.5 38.9  5.99 58.8 33.5   R MEDIAN - II Lumb      Median II Lumb 5.73  0.6  100 10   6.61 100 33.4      Ulnar Palm Int 2.97  2.4  403 10   5.47 252 33.4   R ULNAR - FDI      Wrist FDI 3.70  7.0  100    5.94 100 32.7      B.Elbow FDI 7.81  4.9  70.1 22 53.5  6.15 82.5 32.9      A.Elbow FDI 9.27  5.0  71.8 7.5 51.4  6.20 84 33.5       EMG Summary Table     Spontaneous MUAP Recruitment    IA Fib PSW Fasc H.F. Amp Dur. PPP Pattern   L. FIRST D INTEROSS N  None None None None N N N N   R. PRON TERES N None None None None N N N N   R. BICEPS N None None None None N N N N   R. EXT DIG COMM N None None None None N N N N   R. FIRST D INTEROSS N None None None None N N N N   R. ABD POLL BREVIS N None None None None N N N N   R. TRICEPS N None None None None N N N N   R. C7 PSP N None None None None N N N N

## 2022-03-28 NOTE — LETTER
3/28/2022         RE: Tad Olivas  [address] [facility] [address]        Dear Colleague,    Thank you for referring your patient, Tad Olivas, to the [facility] NEUROLOGY CLINIC [place]. Please see a copy of my visit note below.    [facility]   EMG Laboratory      Nerve Conduction & EMG Report          Patient:       Tad Olivas  Patient ID:    [id number]  Gender:        Male  YOB: 1955  Age:           66 Years 5 Months      History and Examination:  Tad Olivas is a 66 year old man with numbness in the right hand. Symptoms developed abruptly and in association with numbness in the forearm but without neck pain; since then symptoms have fluctuated but are overall improved. He indicated to his referring provider that symptoms were in the fourth and fifth digits; however today he is less specific, and suggests that they are prominent in the third (middle) and fourth (ring) fingers. Examination demonstrates normal bulk and strength. He is referred for evaluation of suspected entrapment neuropathy.     Techniques:  Motor conduction studies were done with surface recording electrodes. Sensory conduction studies were performed with surface electrodes, unless indicated otherwise by (n), designating the use of subdermal recording electrodes. Temperature was monitored and recorded throughout the study. Upper extremities were maintained at a temperature of 32 degrees Centigrade or higher.  EMG was done with a concentric needle electrode.     Results:  Right median sensory and mixed nerve conduction studies demonstrated severe attenuation of amplitude and slowing of conduction, accentuated in the transcarpal segment. Right ulnar sensory and mixed nerve conduction studies were normal. The right radial sensory nerve action potential was mildly attenuated. Right median motor conduction studies demonstrated moderate prolongation of distal latency. Right ulnar motor  conduction studies demonstrated mild to moderate conduction slowing across the elbow when recording from abductor digiti minimi but not first dorsal interosseous. Electromyography was normal.    Interpretation:  This is an abnormal study, demonstrating electrophysiologic evidence of the followin. 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   2. Probable mild right ulnar neuropathy at the elbow.  3. Mild radial sensory neuropathy; this may reflect a mononeuropathy or a sensory polyneuropathy. If clinically indicated, further nerve conduction studies could be performed to clarify.  4. No evidence of right cervical radiculopathy.    Jarret Alicea MD        Sensory NCS      Nerve / Sites Rec. Site Onset Peak Ref. NP Amp Ref. PP Amp Dist Maurice Ref. Temp     ms ms ms  V  V  V cm m/s m/s  C   R MEDIAN - Dig II Anti      Wrist Dig II 4.32 5.73  2.8 10.0 4.4 14 32.4 48.0 33.3   R ULNAR - Dig V Anti      Wrist Dig V 2.08 2.71  11.1 8.0 20.4 12.5 60.0 48.0 33.4   R RADIAL - Snuff      Forearm Snuff 2.34 3.02 3.00 10.5  10.2 12.5 53.3  32.9   R MEDIAN - Ulnar - Palmar      Median Wrist 3.23 3.85 2.40 1.1  4.1 8 24.8  33.9      Ulnar Wrist 1.25 1.77 2.40 5.7  8.0 8 64.0  33.6       Motor NCS      Nerve / Sites Rec. Site Lat Ref. Amp Ref. Rel Amp Dist Maurice Ref. Dur. Area Temp.     ms ms mV mV % cm m/s m/s ms %  C   R MEDIAN - APB      Wrist APB 6.82 4.40 6.2 5.0 100 8   6.20 100 33.5      Elbow APB 11.46  5.8  93.8 24.5 52.9 48.0 6.41 110 32.4   R ULNAR - ADM      Wrist ADM 2.76 3.50 9.1 5.0 100 8   6.04 100 33.3      B.Elbow ADM 6.41  7.2  79.3 23 63.1 48.0 6.15 91 33.3      A.Elbow ADM 8.65  6.9  75.1 10 44.7 48.0 6.20 83.1 33.3      B.Elbow ADM 6.09  5.6  61.2   48.0 5.94 63 33.4      A.Elbow ADM 8.02  5.3  57.6 7.5 38.9  5.99 58.8 33.5   R MEDIAN - II Lumb      Median II Lumb 5.73  0.6  100 10   6.61 100 33.4      Ulnar Palm Int 2.97  2.4  403 10   5.47 252 33.4   R ULNAR - FDI      Wrist FDI 3.70  7.0  100     5.94 100 32.7      B.Elbow FDI 7.81  4.9  70.1 22 53.5  6.15 82.5 32.9      A.Elbow FDI 9.27  5.0  71.8 7.5 51.4  6.20 84 33.5       EMG Summary Table     Spontaneous MUAP Recruitment    IA Fib PSW Fasc H.F. Amp Dur. PPP Pattern   L. FIRST D INTEROSS N None None None None N N N N   R. PRON TERES N None None None None N N N N   R. BICEPS N None None None None N N N N   R. EXT DIG COMM N None None None None N N N N   R. FIRST D INTEROSS N None None None None N N N N   R. ABD POLL BREVIS N None None None None N N N N   R. TRICEPS N None None None None N N N N   R. C7 PSP N None None None None N N N N                              Again, thank you for allowing me to participate in the care of your patient.        Sincerely,        Jarret Ailcea MD

## 2022-03-30 ENCOUNTER — TELEPHONE (OUTPATIENT)
Dept: FAMILY MEDICINE | Facility: CLINIC | Age: 67
End: 2022-03-30

## 2022-03-30 ENCOUNTER — E-VISIT (OUTPATIENT)
Dept: FAMILY MEDICINE | Facility: CLINIC | Age: 67
End: 2022-03-30
Payer: COMMERCIAL

## 2022-03-30 DIAGNOSIS — G56.00 CARPAL TUNNEL SYNDROME, UNSPECIFIED LATERALITY: ICD-10-CM

## 2022-03-30 DIAGNOSIS — G56.00 CARPAL TUNNEL SYNDROME, UNSPECIFIED LATERALITY: Primary | ICD-10-CM

## 2022-03-30 PROCEDURE — 99422 OL DIG E/M SVC 11-20 MIN: CPT | Performed by: FAMILY MEDICINE

## 2022-03-30 RX ORDER — PREDNISONE 20 MG/1
20 TABLET ORAL DAILY
Qty: 7 TABLET | Refills: 1 | Status: SHIPPED | OUTPATIENT
Start: 2022-03-30 | End: 2022-06-24

## 2022-03-30 RX ORDER — PREDNISONE 20 MG/1
20 TABLET ORAL DAILY
Qty: 7 TABLET | Refills: 1 | Status: SHIPPED | OUTPATIENT
Start: 2022-03-30 | End: 2022-03-30

## 2022-03-30 NOTE — TELEPHONE ENCOUNTER
RX for prednisone was sent to [facility].  He uses [facility] in [place].  Please send to [facility].

## 2022-03-31 NOTE — TELEPHONE ENCOUNTER
DIAGNOSIS: Carpal tunnel syndrome   APPOINTMENT DATE: 04/12/2022   NOTES STATUS DETAILS   OFFICE NOTE from referring provider Internal 03/30/2022 Dr De Jesus [facility]   OFFICE NOTE from other specialist Internal 01/31/2022 Dr Zapata [facility]   DISCHARGE SUMMARY from hospital N/A    DISCHARGE REPORT from the ER N/A    OPERATIVE REPORT N/A    MEDICATION LIST N/A    EMG (for Spine) Internal 03/28/2022 Dr Alicea [facility]   IMPLANT RECORD/STICKER N/A    LABS     CBC/DIFF N/A    CULTURES N/A    INJECTIONS DONE IN RADIOLOGY N/A    MRI N/A    CT SCAN N/A    XRAYS (IMAGES & REPORTS) N/A    TUMOR     PATHOLOGY  Slides & report N/A

## 2022-04-11 ENCOUNTER — E-VISIT (OUTPATIENT)
Dept: FAMILY MEDICINE | Facility: CLINIC | Age: 67
End: 2022-04-11
Payer: COMMERCIAL

## 2022-04-11 DIAGNOSIS — M79.651 PAIN OF RIGHT THIGH: Primary | ICD-10-CM

## 2022-04-11 PROCEDURE — 99421 OL DIG E/M SVC 5-10 MIN: CPT | Performed by: FAMILY MEDICINE

## 2022-04-12 ENCOUNTER — PRE VISIT (OUTPATIENT)
Dept: ORTHOPEDICS | Facility: CLINIC | Age: 67
End: 2022-04-12

## 2022-04-12 ENCOUNTER — TELEPHONE (OUTPATIENT)
Dept: ORTHOPEDICS | Facility: CLINIC | Age: 67
End: 2022-04-12

## 2022-04-12 ENCOUNTER — OFFICE VISIT (OUTPATIENT)
Dept: ORTHOPEDICS | Facility: CLINIC | Age: 67
End: 2022-04-12
Attending: FAMILY MEDICINE
Payer: COMMERCIAL

## 2022-04-12 DIAGNOSIS — G56.01 CARPAL TUNNEL SYNDROME OF RIGHT WRIST: Primary | ICD-10-CM

## 2022-04-12 DIAGNOSIS — G56.00 CARPAL TUNNEL SYNDROME, UNSPECIFIED LATERALITY: ICD-10-CM

## 2022-04-12 PROCEDURE — 99204 OFFICE O/P NEW MOD 45 MIN: CPT | Performed by: PLASTIC SURGERY

## 2022-04-12 NOTE — NURSING NOTE
Pre-Operative Teaching Flowsheet     Person(s) involved in teaching: Patient     Motivation Level:  Receptive (willing/able to accept information) and asks appropriate questions where applicable: Yes  Any cultural factors/Alevism beliefs that may influence understanding or compliance? No     Patient demonstrates understanding of the following:  Pre-operative planning, including the necessary appointments and preparation needed prior to surgery: Yes  Which situations necessitate calling provider and whom to contact: Yes  Pain management techniques pre and post op: Yes  How, and when, to access community resources: Yes    Additional Teaching Concerns Addressed:  Post-operative living arrangements and necessary adaptations to living environment.     Instructional Materials Used/Given: Yes, pre-op packet printed from AVS with additional system forms added (COVID Testing Handout, Map, etc). Pre-op soap given (if in clinic).     Time spent with patient: 20 minutes.    Latrell Gilbert RN

## 2022-04-12 NOTE — TELEPHONE ENCOUNTER
Procedure: Right open carpal tunnel release  Facility: [facility]  Length: 20 minutes  Anesthesia: Local  Post-op appointments needed: 2.5 weeks provider only  Surgery packet/instructions given to patient?  Yes    Latrell Gilbert RN

## 2022-04-12 NOTE — PROGRESS NOTES
REFERRING PROVIDER:  Clement De Jesus MD    PRESENTING COMPLAINT:  Consultation for right hand numbness, tingling.    HISTORY OF PRESENTING COMPLAINT:  Mr. Olivas is 66 years old.  He is right-hand dominant.  He is very clear that 6 weeks ago after using his hands for work on his home, he started noticing numbness and tingling in his right hand, not in his left hand.  It involves all 5 fingers, but mostly in the median innervated fingers, less in the ulnar innervated fingers.  Minimal night symptoms.  No weakness.  No neck pain.  No radiculopathy, no feet symptoms.    PAST MEDICAL HISTORY:  Hypertension, hyperlipidemia, GERD, depression.    PAST SURGICAL HISTORY:  Knee replacement, shoulder surgery.    MEDICATIONS:  Atorvastatin, amlodipine, Wellbutrin, furosemide, tamsulosin, sildenafil, Prozac.    ALLERGIES:  Nil.    SOCIAL HISTORY:  Does not smoke, socially drinks.    REVIEW OF SYSTEMS:  Denies chest pain, shortness of breath, MI, CVA, DVT and PE.    PHYSICAL EXAMINATION:  Vital signs stable.  He is afebrile, in no obvious distress.  On examination of his right hand, he has no atrophy.  He has full range of motion, no tenderness.  Tinel's is positive at the wrist, not in the proximal arm or neck.  Two-point discrimination is 5-6 mm at the median innervated fingers, 4 mm at the ulnar innervated fingers.  Phalen is positive.  Carpal compression is positive.  Elbow flexion test is negative.  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  Ulnar nerve on the whole was okay, maybe a mild ulnar neuropathy at the elbow.  No evidence for cervical radiculopathy.    ASSESSMENT AND PLAN:  Based on the above findings, a diagnosis of moderately severe right carpal tunnel syndrome was made.  Symptoms are worsening.  Symptoms are moderately severe.  My advice is right carpal tunnel release, given his severity of the problem.  Showed him where the scar would be, explained  to him what it would entail.  Risks of pain, infection, bleeding, scarring, asymmetry, seromas, hematomas, wound breakdown, wound dehiscence, injury to deeper structures like nerves, vessels and tendons, CRPS, pillar pain, DVT, PE, MI, CVA, pneumonia, renal failure and death were all explained.  I was also very clear that I cannot guarantee all his symptoms will go away, given the moderately severe nature of his problem.  He understood.  All questions were answered.  He was happy with the visit.  All exam and discussion done in presence of my resident, Roger Toth.    Total time spent with chart review, visit itself and post-visit paperwork was 20 minutes.    cc:  Clement De Jesus MD  [facility]  [address] [place] [address]

## 2022-04-12 NOTE — LETTER
4/12/2022         RE: Tad Olivas  [address] [facility] [address]        Dear Colleague,    Thank you for referring your patient, Tad Olivas, to the [facility]. Please see a copy of my visit note below.    REFERRING PROVIDER:  Clement De Jesus MD    PRESENTING COMPLAINT:  Consultation for right hand numbness, tingling.    HISTORY OF PRESENTING COMPLAINT:  Mr. Olivas is 66 years old.  He is right-hand dominant.  He is very clear that 6 weeks ago after using his hands for work on his home, he started noticing numbness and tingling in his right hand, not in his left hand.  It involves all 5 fingers, but mostly in the median innervated fingers, less in the ulnar innervated fingers.  Minimal night symptoms.  No weakness.  No neck pain.  No radiculopathy, no feet symptoms.    PAST MEDICAL HISTORY:  Hypertension, hyperlipidemia, GERD, depression.    PAST SURGICAL HISTORY:  Knee replacement, shoulder surgery.    MEDICATIONS:  Atorvastatin, amlodipine, Wellbutrin, furosemide, tamsulosin, sildenafil, Prozac.    ALLERGIES:  Nil.    SOCIAL HISTORY:  Does not smoke, socially drinks.    REVIEW OF SYSTEMS:  Denies chest pain, shortness of breath, MI, CVA, DVT and PE.    PHYSICAL EXAMINATION:  Vital signs stable.  He is afebrile, in no obvious distress.  On examination of his right hand, he has no atrophy.  He has full range of motion, no tenderness.  Tinel's is positive at the wrist, not in the proximal arm or neck.  Two-point discrimination is 5-6 mm at the median innervated fingers, 4 mm at the ulnar innervated fingers.  Phalen is positive.  Carpal compression is positive.  Elbow flexion test is negative.  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  Ulnar nerve on the whole was okay, maybe a mild ulnar neuropathy at the elbow.  No evidence for cervical radiculopathy.    ASSESSMENT AND PLAN:  Based on the above findings,  a diagnosis of moderately severe right carpal tunnel syndrome was made.  Symptoms are worsening.  Symptoms are moderately severe.  My advice is right carpal tunnel release, given his severity of the problem.  Showed him where the scar would be, explained to him what it would entail.  Risks of pain, infection, bleeding, scarring, asymmetry, seromas, hematomas, wound breakdown, wound dehiscence, injury to deeper structures like nerves, vessels and tendons, CRPS, pillar pain, DVT, PE, MI, CVA, pneumonia, renal failure and death were all explained.  I was also very clear that I cannot guarantee all his symptoms will go away, given the moderately severe nature of his problem.  He understood.  All questions were answered.  He was happy with the visit.  All exam and discussion done in presence of my resident, Roger Toth.    Total time spent with chart review, visit itself and post-visit paperwork was 20 minutes.    cc:  MD FRANKIE Sanchez [facility]  [address] [place] [address]          Again, thank you for allowing me to participate in the care of your patient.        Sincerely,        FRANKIE Linder MD

## 2022-04-13 ENCOUNTER — TELEPHONE (OUTPATIENT)
Dept: ORTHOPEDICS | Facility: CLINIC | Age: 67
End: 2022-04-13
Payer: COMMERCIAL

## 2022-04-13 DIAGNOSIS — Z11.59 ENCOUNTER FOR SCREENING FOR OTHER VIRAL DISEASES: Primary | ICD-10-CM

## 2022-04-13 PROBLEM — G56.01 CARPAL TUNNEL SYNDROME OF RIGHT WRIST: Status: ACTIVE | Noted: 2022-04-13

## 2022-04-13 NOTE — TELEPHONE ENCOUNTER
Spoke with doctor. Dr. Linder is ok with pt having surgery 4/29.    Scheduled 4/29.    COVID scheduled for 4/25.    Post-op scheduled for 5/17.    No further questions/concerns at this time.

## 2022-04-13 NOTE — TELEPHONE ENCOUNTER
Called patient to schedule surgery. Pt noted surgeon wanted him off medication before surgery. Writer did not see any notes about this to . Sent message to surgeon to see how long pt needs to wait to have surgery.    PT understood and understands writer will call back once writer hears back from surgeon. No further questions at this time.

## 2022-04-20 ENCOUNTER — OFFICE VISIT (OUTPATIENT)
Dept: ORTHOPEDICS | Facility: CLINIC | Age: 67
End: 2022-04-20
Attending: FAMILY MEDICINE

## 2022-04-20 ENCOUNTER — ANCILLARY PROCEDURE (OUTPATIENT)
Dept: GENERAL RADIOLOGY | Facility: CLINIC | Age: 67
End: 2022-04-20
Attending: PEDIATRICS
Payer: COMMERCIAL

## 2022-04-20 VITALS
DIASTOLIC BLOOD PRESSURE: 84 MMHG | SYSTOLIC BLOOD PRESSURE: 138 MMHG | HEIGHT: 70 IN | BODY MASS INDEX: 37.94 KG/M2 | WEIGHT: 265 LBS

## 2022-04-20 DIAGNOSIS — M25.551 LATERAL PAIN OF RIGHT HIP: Primary | ICD-10-CM

## 2022-04-20 DIAGNOSIS — M25.551 RIGHT HIP PAIN: ICD-10-CM

## 2022-04-20 DIAGNOSIS — M79.651 PAIN OF RIGHT THIGH: ICD-10-CM

## 2022-04-20 PROCEDURE — 99213 OFFICE O/P EST LOW 20 MIN: CPT | Performed by: PEDIATRICS

## 2022-04-20 PROCEDURE — 73502 X-RAY EXAM HIP UNI 2-3 VIEWS: CPT | Mod: TC | Performed by: RADIOLOGY

## 2022-04-20 NOTE — PROGRESS NOTES
ASSESSMENT & PLAN    Diagnoses and all orders for this visit:    Lateral pain of right hip  -     XR Pelvis w Hip Right 1 View; Future    Pain of right thigh  -     Orthopedic  Referral      Lateral hip source. Does not appear joint related.  PT offered, HEP reviewed.  We discussed consideration of additional imaging of the affected area, but this is not required currently given assessment and plan for other intervention.  Questions answered. Discussed signs and symptoms that may indicate more serious issues; the patient was instructed to seek appropriate care if noted. Travon indicates understanding of these issues and agrees with the plan.      See Patient Instructions  Patient Instructions   Pain over the right lateral hip most consistent with a muscular/soft tissue source.  Hip joint exam is reassuring, as are the x-rays for no significant degenerative change and no acute bony abnormality.  Discussed potential for continued improvement with time.  Discussed rehab exercises; home exercises reviewed today.  Formal physical therapy shortly also consideration, and you may contact clinic for PT referral if desired.  No indication for additional imaging or for an injection at this point, though we did briefly discussed indications for each of the use should the pain increase or symptoms change.  Plan to monitor over the next 2 to 3 weeks with exercises, and contact clinic if not improving; likely formal PT next if persistent issues.    If you have any further questions for your physician or physician s care team you can call [phone] and use option 3 to leave a voice message. Calls received during business hours will be returned same day.        Jordon Magallon DO  [facility]      CC: Clement De Jesus      -----  No chief complaint on file.      SUBJECTIVE  Tad Olivas is a/an 66 year old male who is seen in consultation at the request of  Clement De Jesus M.D.  [facility] Brief Operative Note    Pre-operative diagnosis: anemia, vomiting   Post-operative diagnosis Anastomotic ulcer   Procedure: Procedure(s):  UPPER ENDOSCOPY WITH BIOPSIES - Wound Class: II-Clean Contaminated   Surgeon: Roger Vazquez MD   Assistants(s):    Estimated blood loss: Minimal    Specimens: Biopsies stomal ulcer, gastric pouch   Findings: Ulcer at previous anastomosis, prominent friable tissue gastric pouch        "for evaluation of right hip and thigh pain.  A few weeks ago, he was walking around a pool table and swung his leg around and had a stinger in his right lateral hip.  Pain became around his hip, down his thigh to his knee.    Was seen by primary care, was given an anti inflammatory and used a heating pad.  Has had some decrease in pain.    Did feel a pop in his anterior and lateral hip about 5 days ago and since that time he has had some decrease in pain.    Is having carpal tunnel surgery next week.  Was given an oral steroid about 3 weeks ago for this.       The patient is seen by themselves.      Onset: 3+ week(s) ago. Reports insidious onset without acute precipitating event.  Location of Pain: right lateral hip   Worsened by: prolonged standing, time   Better with: heat   Treatments tried: heat, Relafen   Associated symptoms: popping     Orthopedic/Surgical history: may have had something similar in the past.   Did have a knee injury at 15, had ligament damage and repair.  TKA 8 years ago thru [place] and was told his leg was out of alignment     Social History/Occupation: semi retired, sales     No family history pertinent to patient's problem today.     **  After initial onset, also had some pain radiating to hip, medial and anterior thigh.  Currently more of a tightness lateral hip.      REVIEW OF SYSTEMS:  Review of Systems   All other systems reviewed and are negative.        OBJECTIVE:  /84   Ht 1.778 m (5' 10\")   Wt 120.2 kg (265 lb)   BMI 38.02 kg/m     General: healthy, alert and in no distress  HEENT: no scleral icterus or conjunctival erythema  Skin: no suspicious lesions or rash. No jaundice.  CV: distal perfusion intact   Resp: normal respiratory effort without conversational dyspnea   Psych: normal mood and affect  Gait: ambulates independently  Neuro: Normal light sensory exam of  extremity     Right hip exam    ROM:     Grossly intact active and passive ROM  Mild lateral discomfort with " active abduction     Strength:      flexion        abduction        adduction   Grossly intact, no change in pain    Tender:      TFL mild    Non Tender:      remainder of hip area    Sensation:      grossly intact in hip and thigh    Special Tests:      neg (-) FADIR       neg (-) scour       Some lateral discomfort with Muriel       Log roll neg        RADIOLOGY:  I independently ordered, visualized and reviewed these images with the patient  Hip joint spacing preserved. CAM appearance femoral head-neck.      XR Pelvis w Hip Right 1 View    Narrative    EXAM: PELVIS AND HIP RIGHT ONE VIEW  DATE/TIME: 4/20/2022 10:45 AM     INDICATION: Right-sided hip pain.   COMPARISON: 6/2/2015.      Impression    IMPRESSION:  1.  Normal right hip joint spacing and alignment.  2.  Right proximal femur cam morphology.  3.  Degenerative changes in the lower lumbar spine.  4.  No fracture or joint malalignment.    BARNEY URIARTE MD         SYSTEM ID:  [id number]

## 2022-04-20 NOTE — PATIENT INSTRUCTIONS
Pain over the right lateral hip most consistent with a muscular/soft tissue source.  Hip joint exam is reassuring, as are the x-rays for no significant degenerative change and no acute bony abnormality.  Discussed potential for continued improvement with time.  Discussed rehab exercises; home exercises reviewed today.  Formal physical therapy shortly also consideration, and you may contact clinic for PT referral if desired.  No indication for additional imaging or for an injection at this point, though we did briefly discussed indications for each of the use should the pain increase or symptoms change.  Plan to monitor over the next 2 to 3 weeks with exercises, and contact clinic if not improving; likely formal PT next if persistent issues.    If you have any further questions for your physician or physician s care team you can call [phone] and use option 3 to leave a voice message. Calls received during business hours will be returned same day.

## 2022-04-20 NOTE — LETTER
4/20/2022         RE: Tad Olivas  [address]   [place] [address]        Dear Colleague,    Thank you for referring your patient, Tad Olivas, to the [facility]. Please see a copy of my visit note below.    ASSESSMENT & PLAN    Diagnoses and all orders for this visit:    Lateral pain of right hip  -     XR Pelvis w Hip Right 1 View; Future    Pain of right thigh  -     Orthopedic  Referral      Lateral hip source. Does not appear joint related.  PT offered, HEP reviewed.  We discussed consideration of additional imaging of the affected area, but this is not required currently given assessment and plan for other intervention.  Questions answered. Discussed signs and symptoms that may indicate more serious issues; the patient was instructed to seek appropriate care if noted. Travon indicates understanding of these issues and agrees with the plan.      See Patient Instructions  Patient Instructions   Pain over the right lateral hip most consistent with a muscular/soft tissue source.  Hip joint exam is reassuring, as are the x-rays for no significant degenerative change and no acute bony abnormality.  Discussed potential for continued improvement with time.  Discussed rehab exercises; home exercises reviewed today.  Formal physical therapy shortly also consideration, and you may contact clinic for PT referral if desired.  No indication for additional imaging or for an injection at this point, though we did briefly discussed indications for each of the use should the pain increase or symptoms change.  Plan to monitor over the next 2 to 3 weeks with exercises, and contact clinic if not improving; likely formal PT next if persistent issues.    If you have any further questions for your physician or physician s care team you can call [phone] and use option 3 to leave a voice message. Calls received during business hours will be returned same  "day.        Jordon Magallon DO  [facility]      CC: Clement De Jesus      -----  No chief complaint on file.      SUBJECTIVE  Tad Olivas is a/an 66 year old male who is seen in consultation at the request of  Clement De Jesus M.D. for evaluation of right hip and thigh pain.  A few weeks ago, he was walking around a pool table and swung his leg around and had a stinger in his right lateral hip.  Pain became around his hip, down his thigh to his knee.    Was seen by primary care, was given an anti inflammatory and used a heating pad.  Has had some decrease in pain.    Did feel a pop in his anterior and lateral hip about 5 days ago and since that time he has had some decrease in pain.    Is having carpal tunnel surgery next week.  Was given an oral steroid about 3 weeks ago for this.       The patient is seen by themselves.      Onset: 3+ week(s) ago. Reports insidious onset without acute precipitating event.  Location of Pain: right lateral hip   Worsened by: prolonged standing, time   Better with: heat   Treatments tried: heat, Relafen   Associated symptoms: popping     Orthopedic/Surgical history: may have had something similar in the past.   Did have a knee injury at 15, had ligament damage and repair.  TKA 8 years ago thru [place] and was told his leg was out of alignment     Social History/Occupation: semi retired, sales     No family history pertinent to patient's problem today.     **  After initial onset, also had some pain radiating to hip, medial and anterior thigh.  Currently more of a tightness lateral hip.      REVIEW OF SYSTEMS:  Review of Systems   All other systems reviewed and are negative.        OBJECTIVE:  /84   Ht 1.778 m (5' 10\")   Wt 120.2 kg (265 lb)   BMI 38.02 kg/m     General: healthy, alert and in no distress  HEENT: no scleral icterus or conjunctival erythema  Skin: no suspicious lesions or rash. No jaundice.  CV: distal perfusion " intact   Resp: normal respiratory effort without conversational dyspnea   Psych: normal mood and affect  Gait: ambulates independently  Neuro: Normal light sensory exam of  extremity     Right hip exam    ROM:     Grossly intact active and passive ROM  Mild lateral discomfort with active abduction     Strength:      flexion        abduction        adduction   Grossly intact, no change in pain    Tender:      TFL mild    Non Tender:      remainder of hip area    Sensation:      grossly intact in hip and thigh    Special Tests:      neg (-) FADIR       neg (-) scour       Some lateral discomfort with Muriel       Log roll neg        RADIOLOGY:  I independently ordered, visualized and reviewed these images with the patient  Hip joint spacing preserved. CAM appearance femoral head-neck.      XR Pelvis w Hip Right 1 View    Narrative    EXAM: PELVIS AND HIP RIGHT ONE VIEW  DATE/TIME: 4/20/2022 10:45 AM     INDICATION: Right-sided hip pain.   COMPARISON: 6/2/2015.      Impression    IMPRESSION:  1.  Normal right hip joint spacing and alignment.  2.  Right proximal femur cam morphology.  3.  Degenerative changes in the lower lumbar spine.  4.  No fracture or joint malalignment.    BARNEY URIARTE MD         SYSTEM ID:  [id number]                   Again, thank you for allowing me to participate in the care of your patient.        Sincerely,        Jordon Magallon DO

## 2022-04-25 ENCOUNTER — LAB (OUTPATIENT)
Dept: LAB | Facility: CLINIC | Age: 67
End: 2022-04-25
Payer: COMMERCIAL

## 2022-04-25 DIAGNOSIS — Z11.59 ENCOUNTER FOR SCREENING FOR OTHER VIRAL DISEASES: ICD-10-CM

## 2022-04-25 PROCEDURE — U0003 INFECTIOUS AGENT DETECTION BY NUCLEIC ACID (DNA OR RNA); SEVERE ACUTE RESPIRATORY SYNDROME CORONAVIRUS 2 (SARS-COV-2) (CORONAVIRUS DISEASE [COVID-19]), AMPLIFIED PROBE TECHNIQUE, MAKING USE OF HIGH THROUGHPUT TECHNOLOGIES AS DESCRIBED BY CMS-2020-01-R: HCPCS

## 2022-04-25 PROCEDURE — U0005 INFEC AGEN DETEC AMPLI PROBE: HCPCS

## 2022-04-26 LAB — SARS-COV-2 RNA RESP QL NAA+PROBE: NEGATIVE

## 2022-04-29 ENCOUNTER — HOSPITAL ENCOUNTER (OUTPATIENT)
Facility: AMBULATORY SURGERY CENTER | Age: 67
Discharge: HOME | End: 2022-04-29
Attending: PLASTIC SURGERY | Admitting: PLASTIC SURGERY
Payer: COMMERCIAL

## 2022-04-29 VITALS
HEART RATE: 75 BPM | DIASTOLIC BLOOD PRESSURE: 85 MMHG | SYSTOLIC BLOOD PRESSURE: 156 MMHG | TEMPERATURE: 97 F | OXYGEN SATURATION: 95 % | RESPIRATION RATE: 12 BRPM

## 2022-04-29 DIAGNOSIS — G56.01 CARPAL TUNNEL SYNDROME OF RIGHT WRIST: ICD-10-CM

## 2022-04-29 PROCEDURE — G8907 PT DOC NO EVENTS ON DISCHARG: HCPCS

## 2022-04-29 PROCEDURE — G8918 PT W/O PREOP ORDER IV AB PRO: HCPCS

## 2022-04-29 PROCEDURE — 64721 CARPAL TUNNEL SURGERY: CPT | Mod: RT

## 2022-04-29 PROCEDURE — 64721 CARPAL TUNNEL SURGERY: CPT | Mod: RT | Performed by: PLASTIC SURGERY

## 2022-04-29 RX ORDER — AMOXICILLIN 250 MG
1-2 CAPSULE ORAL 2 TIMES DAILY
Qty: 30 TABLET | Refills: 0 | Status: SHIPPED | OUTPATIENT
Start: 2022-04-29 | End: 2022-06-24

## 2022-04-29 RX ORDER — ONDANSETRON 4 MG/1
4-8 TABLET, ORALLY DISINTEGRATING ORAL EVERY 8 HOURS PRN
Qty: 4 TABLET | Refills: 0 | Status: SHIPPED | OUTPATIENT
Start: 2022-04-29 | End: 2022-06-24

## 2022-04-29 RX ORDER — OXYCODONE HYDROCHLORIDE 5 MG/1
5-10 TABLET ORAL EVERY 6 HOURS PRN
Qty: 6 TABLET | Refills: 0 | Status: SHIPPED | OUTPATIENT
Start: 2022-04-29 | End: 2022-06-24

## 2022-04-29 NOTE — OP NOTE
PREOPERATIVE DIAGNOSIS:  Right carpal tunnel syndrome.       POSTOPERATIVE DIAGNOSIS:  Right carpal tunnel syndrome.       PROCEDURES:  Right open carpal tunnel release.       SURGEON:  Ace Linder MD.       ANESTHESIA:  Local.       COMPLICATIONS:  Nil.       DRAINS:  Nil.       SPECIMENS:  Nil.       BLOOD LOSS:  1 mL.       DESCRIPTION OF PROCEDURE:   After informed consent was obtained from the patient, the proper site was discussed with him and appropriately marked in the procedure room. He was placed in supine position with his knees comfortably flexed and pillows underneath them.  Preoperative antibiotics were given in the OR.  His right hand was then surgically prepped and draped in a standard surgical fashion. I injected 1% lidocaine mixed 1:100,000 epinephrine 20 mL in the wrist area in the pre-op area. I waited at least 10 minutes had passed for the medication to take effect.  An Esmarch was used to exsanguinate the hand and forearm and the tourniquet was elevated to 250 mmHg pressure.  I then made an incision over the proximal palm in line with the radial aspect of the ring finger.  I then bluntly dissected down to the palmar fascia, opened the palmar fascia and then identified the transverse carpal ligament.  I looked for any nerve-like structure passing superficial to the transcarpal ligament, which I did not see.  I then made an incision with a 15 blade through the transcarpal ligament, again in line with the ulnar aspect of the ring finger, in an effort to leave part of the transcarpal ligament over the underlying median nerve.  I then went ahead and distally cut the transcarpal ligament with a sharp pair of scissors until the deep fat of the hand was identified and the entire transcarpal ligament distally was released.  Then proximally under direct vision, I released the transcarpal ligament and the flexor retinaculum, again releasing the entire median nerve in the process.  Within the carpal  tunnel,  I did not see any masses.  I then let the tourniquet down.  Minimal bleeding was seen superficially.  No deep bleeding was seen.  It was controlled with bipolar cautery.  I then closed the incision with 3-0 nylon suture in an interrupted horizontal mattress fashion.  A sterile dressing and an ACE wrap were placed.  The patient tolerated the procedure well.  All counts were correct at the end of the case.  The patient was sent to recovery room in stable condition.

## 2022-04-29 NOTE — DISCHARGE INSTRUCTIONS
CARPAL AND ULNAR NERVE RELEASE POST-OPERATIVE INSTRUCTIONS    Instructions      Have someone drive you home after surgery and help you at home for 1-2      days.     Get plenty of rest.     Follow balanced diet.     Decreased activity may promote constipation, so you may want to add      more raw fruit to your diet, and be sure to increase fluid intake.     Take pain medication as prescribed. Do not take aspirin or any products      containing aspirin.     Do not drink alcohol when taking pain medications.     Even when not taking pain medications, no alcohol for 3 weeks as it      causes fluid retention.     If you are taking vitamins with iron, resume these as tolerated.     Do not smoke, as smoking delays healing and increases the risk of      complications.    Activities     Do not drive until you are no longer taking any pain medications      (narcotics).     Start walking as soon as possible, this helps to reduce swelling and       lowers the chance of blood clots.     Resume normal activities gradually.     Return to work in 1-2 days, depending upon extent of surgery     No strenuous work or stress on wound for 2-3 weeks.    Incision Care     Keep ACE wrap on for 3 days then discontinue. Keep dry until then with plastic bag. After 3 days may get wet. Every hour raise your hand above your head and pump your fist 10 times. Rewrap the ACE if it gets loose or too tight.      Avoid exposing scars to sun for at least 12 months.     Always use a strong sunblock, if sun exposure is unavoidable (SPF 50 or      greater).     Inspect daily for signs of infection.     No tub soaking, bathing, or swimming while sutures or drains are in place.     Keep area clean and dry for first 24 hours.     What to Expect     Some swelling and bruising.     May have slight bleeding from incision.  Apply 4 x 4 gauze with slight pressure to       control bleeding.     Skin grafts and flaps may take several weeks or months to heal; a  support garment or      bandage may be necessary for up to a year.    Appearance     Final results of surgery may take a year or more.    Follow-Up Care     If external sutures have been used, they will be removed in 5-14 days.    Please note my office will call you 1-2 business days after your procedure to check up on how you're doing and to schedule your post-operative appointment.        When to Call     If you have increased swelling or bruising.     If swelling and redness persist after a few days.     If you have increased redness along the incision.     If you have severe or increased pain not relieved by medication.     If you have any side effects to medications; such as, rash, nausea,      headache, vomiting.     If you have an oral temperature over 100.4 degrees.     If you have any yellowish or greenish drainage from the incisions or      notice a foul odor.     If you have bleeding from the incisions that is difficult to control with      light pressure.     If you have loss of feeling or motion.    For Medical Questions, Please Call:     [phone], Monday - Friday, 8 a.m. - 4:30 p.m.     After hours and on weekends, call Hospital Paging at [phone] and      ask for the Plastic Surgeon on call.

## 2022-05-17 ENCOUNTER — OFFICE VISIT (OUTPATIENT)
Dept: ORTHOPEDICS | Facility: CLINIC | Age: 67
End: 2022-05-17
Payer: COMMERCIAL

## 2022-05-17 DIAGNOSIS — G56.00 CARPAL TUNNEL SYNDROME, UNSPECIFIED LATERALITY: Primary | ICD-10-CM

## 2022-05-17 PROCEDURE — 99024 POSTOP FOLLOW-UP VISIT: CPT | Performed by: PLASTIC SURGERY

## 2022-05-17 NOTE — LETTER
5/17/2022         RE: Tad Olivas  [id number] [facility] [id number]        Dear Colleague,    Thank you for referring your patient, Tad Olivas, to the [facility]. Please see a copy of my visit note below.    PRESENTING COMPLAINT:  Postoperative visit status post right carpal tunnel release done on 04/29/2022.    HISTORY OF PRESENTING COMPLAINT:  Mr. Olivas is 66 years old, a couple of weeks out from his surgery, doing well, symptoms improved, no major issues.    PHYSICAL EXAMINATION:  Vital signs are stable.   He is afebrile, in no obvious distress.  Wound is healing in well.    ASSESSMENT AND PLAN:  Based upon the above findings, a diagnosis of right carpal tunnel release was made.  Sutures were removed.  Advised aggressive moisturization.  No heavy activities for another 2 weeks.  He will keep an eye on his left hand and will let me know if he wants to proceed with the left side.  All questions were answered.  He was happy with his visit.          Again, thank you for allowing me to participate in the care of your patient.        Sincerely,        FRANKIE Linder MD

## 2022-05-17 NOTE — PROGRESS NOTES
PRESENTING COMPLAINT:  Postoperative visit status post right carpal tunnel release done on 04/29/2022.    HISTORY OF PRESENTING COMPLAINT:  Mr. Olivas is 66 years old, a couple of weeks out from his surgery, doing well, symptoms improved, no major issues.    PHYSICAL EXAMINATION:  Vital signs are stable.   He is afebrile, in no obvious distress.  Wound is healing in well.    ASSESSMENT AND PLAN:  Based upon the above findings, a diagnosis of right carpal tunnel release was made.  Sutures were removed.  Advised aggressive moisturization.  No heavy activities for another 2 weeks.  He will keep an eye on his left hand and will let me know if he wants to proceed with the left side.  All questions were answered.  He was happy with his visit.

## 2022-06-03 DIAGNOSIS — F33.1 MAJOR DEPRESSIVE DISORDER, RECURRENT EPISODE, MODERATE (H): ICD-10-CM

## 2022-06-03 NOTE — TELEPHONE ENCOUNTER
"Requested Prescriptions   Pending Prescriptions Disp Refills    FLUoxetine (PROZAC) 20 MG capsule [Pharmacy Med Name: FLUOXETINE HCL 20MG CAPS] 90 capsule 1     Sig: TAKE ONE CAPSULE BY MOUTH ONCE DAILY        SSRIs Protocol Failed - 6/3/2022  1:06 AM        Failed - PHQ-9 score less than 5 in past 6 months     Please review last PHQ-9 score.           Passed - Medication is active on med list        Passed - Patient is age 18 or older        Passed - Recent (6 mo) or future (30 days) visit within the authorizing provider's specialty     Patient had office visit in the last 6 months or has a visit in the next 30 days with authorizing provider or within the authorizing provider's specialty.  See \"Patient Info\" tab in inbasket, or \"Choose Columns\" in Meds & Orders section of the refill encounter.                  "

## 2022-06-24 ENCOUNTER — VIRTUAL VISIT (OUTPATIENT)
Dept: UROLOGY | Facility: CLINIC | Age: 67
End: 2022-06-24
Payer: COMMERCIAL

## 2022-06-24 DIAGNOSIS — R39.198 SLOW URINARY STREAM: Primary | ICD-10-CM

## 2022-06-24 DIAGNOSIS — Z87.448 HISTORY OF URETHRAL STRICTURE: ICD-10-CM

## 2022-06-24 DIAGNOSIS — N52.9 ERECTILE DYSFUNCTION, UNSPECIFIED ERECTILE DYSFUNCTION TYPE: ICD-10-CM

## 2022-06-24 PROCEDURE — 99214 OFFICE O/P EST MOD 30 MIN: CPT | Mod: 95 | Performed by: PHYSICIAN ASSISTANT

## 2022-06-24 RX ORDER — TAMSULOSIN HYDROCHLORIDE 0.4 MG/1
0.4 CAPSULE ORAL DAILY
Qty: 90 CAPSULE | Refills: 3 | Status: SHIPPED | OUTPATIENT
Start: 2022-06-24 | End: 2023-09-04

## 2022-06-24 RX ORDER — FINASTERIDE 5 MG/1
1 TABLET, FILM COATED ORAL DAILY
Qty: 90 TABLET | Refills: 3 | Status: SHIPPED | OUTPATIENT
Start: 2022-06-24 | End: 2023-08-03

## 2022-06-24 RX ORDER — SILDENAFIL 50 MG/1
50-100 TABLET, FILM COATED ORAL PRN
Qty: 30 TABLET | Refills: 3 | Status: SHIPPED | OUTPATIENT
Start: 2022-06-24 | End: 2024-01-05

## 2022-06-24 NOTE — PATIENT INSTRUCTIONS
UROLOGY CLINIC VISIT PATIENT INSTRUCTIONS    Schedule cystoscopy with Dr. Christiano Leger at the [facility] next available.    Continue tamsulosin (Flomax) 0.4 mg daily. This was refilled.    Continue finasteride (Proscar) 5 mg daily. This was refilled.    Continue to use sildenafil (Viagra)  mg as needed. This was refilled.    CYSTOSCOPY    What is a Cystoscopy?  This is a procedure done to check for problems inside the bladder.  Problems may include polyps (growths), tumors, inflammation (swelling and redness) and other concerns.    The doctor inserts a thin tube (called a cystoscope) into the bladder.  The tube is about the size of a pencil.  We will give you numbing medicine to reduce the pain or discomfort you may feel.    The tube allows the doctor to:  The doctor will be able to see inside the bladder by filling the bladder with water.  The water makes it easier to see any problems that may be present.    If needed, the doctor may use the tube to:  The doctor is able to take tissue samples (biopsies).  Samples are sent to the lab for testing.  The doctor can also burn off any small growths or tumors that are found.  This is call fulguration.    What happens after the exam?  You may go back to your normal diet and activity as you feel ready, unless your doctor tells you not to.    For the next two days, you may notice:  Some blood in your urine.  Some burning when you urinate (use the toilet).  An urge to urinate more often.  Bladder spasms.    These are normal after the procedure. They should go away on their own after a day or two.      You can help to relieve the above listed symptoms by:  Drinking 6 to 8 large glasses of water each day (includes drinks at meals).  This will help clear the urine.  Take warm baths to relieve pain and bladder spasms.  Do not add anything to the bath water.  Your doctor may prescribe pain medicine.  You may also take Tylenol (acetaminophen) for pain.    When  should I call my doctor?  A fever over 100.0 F (38 C) for more than a day.  (Before you call the doctor, check your temperature under your tongue.)  Chills.  Failure to urinate: No urine comes out when you try to use the toilet.  (Try soaking in a bathtub full of warm water.  If still no urine, call your doctor.)  A lot of blood in the urine or blood clots larger than a nickel.  Pain in the back or abdomen (belly / stomach area).  Pain or spasms that are not relieved by warm tub baths and pain medicine.  Severe pain, burning or other problems while passing urine.  Pain that gets worse after two days.      If you have any issues, questions or concerns in the meantime, do not hesitate to contact us at [phone] or via [facility].     It was a pleasure meeting with you today.  Thank you for allowing me and my team the privilege of caring for you today.  YOU are the reason we are here, and I truly hope we provided you with the excellent service you deserve.  Please let us know if there is anything else we can do for you so that we can be sure you are leaving completely satisfied with your care experience.

## 2022-06-24 NOTE — PROGRESS NOTES
Tad Olivas  who is being evaluated via a billable video visit.      How would you like to obtain your AVS? MyChart  If the video visit is dropped, the invitation should be resent by: Send to e-mail at: [email or web address]  Will anyone else be joining your video visit? No    Patient unable to connect for video visit. Appointment converted to telephone.    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 Had been doing well with tamsulosin and finasteride for a period of time, now with bothersome split stream and urinary dribbling. We reviewed possible etiologies for this symptoms including detrusor hypocontractility, stricture recurrence, BPH progression, vs other. Discussed option to repeat cystoscopy to ensure no stricture recurrence since his last cysto was 5 years ago. He would like to proceed. If urethra is open and prostate non-occlusive, he would prefer to continue with medical management and is not yet ready to commit to CIC.     Regarding his ED, he would like a refill of sildenafil. Would also like to discuss IPP as he is not getting a good response with max dose PDE5 inhibitors.    Plan:  -Cystourethroscopy with Dr. Leger next available to rule out stricture recurrence and assess prostate size.  -Continue tamsulosin 0.4 mg daily. Refilled.  -Continue finasteride 5 mg daily. Refilled.  -Continue sildenafil  mg PRN. Refilled.  -Can further discuss IPP with Dr. Leger, Dr. Olivo, or Dr. Camacho.    Janet Dodd PA-C      HPI: Tad Olivas is a very pleasant 66 year old male who is 6 years s/p buccal graft dorsal onlay urethroplasty for a history of bulbar and penile urethral strictures (surgeon: Dr. Christiano Leger). Last cystoscopy with Dr. Leger on 10/30/17 which  "revealed no evidence for stricture recurrence. He continued to complain of slow flow post-operatively so was referred for urodynamics testing, performed on 5/29/19. Results summarized as follows:     -Normal bladder capacity (325 mL) with normal filling sensations.  -Normal bladder compliance without DO/DOI.  -No SERGE.  -Fair detrusor contraction during voiding to max Pdet 13 cm H2O.  -Reduced flow rate (Qmax 8.3 mL/s) with a continuous flow curve and complete bladder emptying (final PVR 0 mL).  -No evidence for DSD or bladder outlet obstruction.  -Fluoroscopy reveals a mildly trabeculated bladder wall without diverticuli or VUR. Bladder neck is closed during filling and open during voiding with some possible narrowing noted within the prostatic urethra.    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 He was offered to start CIC, but deferred as he was emptying well. He has continued to take tamsulosin 0.4 mg daily (higher dose caused symptomatic hypotension) and finasteride 5 mg daily. He can tell a difference when he forgets to take these medications as his flow will be noticeably worse.     Last office visit 10/26/21:  Today, urinary symptoms are stable. Needs refills of both medications. He tried the higher dose of sildenafil prescribed at his last visit which helped \"a little.\"     TODAY   6/24/22:  Having more split stream and dribbling with urination which he finds bothersome. He denies stress or urge incontinence.  ED and ejaculation symptoms unchanged. Requests a refill of sildenafil. Interested in learning more about IPP.    LABS:  PSA   Date Value Ref Range Status   08/13/2020 1.02 0 - 4 ug/L Final     Comment:     Assay Method:  Chemiluminescence using Siemens Vista analyzer     Prostate Specific Antigen Screen   Date Value Ref Range Status   11/12/2021 0.98 0.00 - 4.00 ug/L Final     Creatinine   Date Value Ref Range Status "   09/22/2021 1.23 0.66 - 1.25 mg/dL Final   08/13/2020 1.25 0.66 - 1.25 mg/dL Final       37 minutes spent on the date of the encounter, doing pre-visit chart review, interpreting lab/imaging results, ordering labs/tests, ordering prescriptions, and documenting, including 18 minutes spent on the phone with the patient.

## 2022-06-30 ENCOUNTER — MYC MEDICAL ADVICE (OUTPATIENT)
Dept: UROLOGY | Facility: CLINIC | Age: 67
End: 2022-06-30

## 2022-07-05 ENCOUNTER — TELEPHONE (OUTPATIENT)
Dept: UROLOGY | Facility: CLINIC | Age: 67
End: 2022-07-05

## 2022-07-05 NOTE — TELEPHONE ENCOUNTER
----- Message from Anna Marie Martinez RN sent at 7/1/2022  8:11 AM CDT -----  Hello,    When you get a chance, are you able to contact patient to assist in scheduling a cysto with Dr. Leger per Janet Dodd PA-C?    Thank you,    Anna Marie Martinez RN, BSN

## 2022-08-12 NOTE — TELEPHONE ENCOUNTER
Health Call Center    Phone Message    May a detailed message be left on voicemail: no     Reason for Call: Patient called back to get scheduled for his cystoscopy with Dr. Leger. Soonest availability is on 12/5 at 3 pm. If anything is on hold for patient for an earlier appt, please reach out to patient to further discuss. Thank you     Action Taken: Message routed to:  [facility] ([facility] Urology    Travel Screening: Not Applicable

## 2022-08-29 ENCOUNTER — MYC MEDICAL ADVICE (OUTPATIENT)
Dept: FAMILY MEDICINE | Facility: CLINIC | Age: 67
End: 2022-08-29

## 2022-09-27 ENCOUNTER — OFFICE VISIT (OUTPATIENT)
Dept: ORTHOPEDICS | Facility: CLINIC | Age: 67
End: 2022-09-27
Payer: COMMERCIAL

## 2022-09-27 DIAGNOSIS — G56.00 CARPAL TUNNEL SYNDROME, UNSPECIFIED LATERALITY: Primary | ICD-10-CM

## 2022-09-27 PROCEDURE — 99213 OFFICE O/P EST LOW 20 MIN: CPT | Performed by: PLASTIC SURGERY

## 2022-09-27 NOTE — PROGRESS NOTES
PRESENTING COMPLAINT:  Postoperative visit status post right carpal tunnel release done 04/29/2022 with resultant occasional stiffness of the wrist.    HISTORY OF PRESENTING COMPLAINT:  Mr. Olivas is 66 years old.  He is  about 6 months out from surgery.  Symptoms of numbness and tingling have improved tremendously.  He noticed about a month ago, started having nonspecific wrist/finger stiffness that comes and goes, usually in the morning, not related to anything specific.  No trauma.  His left symptoms have remained abated.    PHYSICAL EXAMINATION:  Vital signs stable.  She is afebrile, in no obvious distress.  He is fully healed.  Right wrist, no swelling.  No range of motion issues.  No stiffness.    ASSESSMENT AND PLAN:  Based on above findings, a diagnosis of right carpal tunnel release was made.  Stiffness is nonspecific.  No evidence for tendinitis.  No evidence of swelling.  Reassured him.  I will see him back on a p.r.n. basis.  All questions were answered.  He was happy with the visit.  All exam and discussion done in presence of my resident, Cameron Stockton.

## 2022-09-27 NOTE — LETTER
9/27/2022         RE: Tad Olivas  [id number] [facility] [id number]        Dear Colleague,    Thank you for referring your patient, Tad Olivas, to the [facility]. Please see a copy of my visit note below.    PRESENTING COMPLAINT:  Postoperative visit status post right carpal tunnel release done 04/29/2022 with resultant occasional stiffness of the wrist.    HISTORY OF PRESENTING COMPLAINT:  Mr. Olivas is 66 years old.  He is  about 6 months out from surgery.  Symptoms of numbness and tingling have improved tremendously.  He noticed about a month ago, started having nonspecific wrist/finger stiffness that comes and goes, usually in the morning, not related to anything specific.  No trauma.  His left symptoms have remained abated.    PHYSICAL EXAMINATION:  Vital signs stable.  She is afebrile, in no obvious distress.  He is fully healed.  Right wrist, no swelling.  No range of motion issues.  No stiffness.    ASSESSMENT AND PLAN:  Based on above findings, a diagnosis of right carpal tunnel release was made.  Stiffness is nonspecific.  No evidence for tendinitis.  No evidence of swelling.  Reassured him.  I will see him back on a p.r.n. basis.  All questions were answered.  He was happy with the visit.  All exam and discussion done in presence of my resident, Cameron Stockton.          Again, thank you for allowing me to participate in the care of your patient.        Sincerely,        FRANKIE Linder MD

## 2022-09-29 ENCOUNTER — MYC REFILL (OUTPATIENT)
Dept: FAMILY MEDICINE | Facility: CLINIC | Age: 67
End: 2022-09-29

## 2022-09-29 DIAGNOSIS — F33.1 MAJOR DEPRESSIVE DISORDER, RECURRENT EPISODE, MODERATE (H): ICD-10-CM

## 2022-10-03 ENCOUNTER — MYC MEDICAL ADVICE (OUTPATIENT)
Dept: FAMILY MEDICINE | Facility: CLINIC | Age: 67
End: 2022-10-03

## 2022-10-03 RX ORDER — BUPROPION HYDROCHLORIDE 100 MG/1
TABLET, EXTENDED RELEASE ORAL
Qty: 90 TABLET | Refills: 0 | Status: SHIPPED | OUTPATIENT
Start: 2022-10-03 | End: 2022-11-03

## 2022-10-03 RX ORDER — BUPROPION HYDROCHLORIDE 100 MG/1
TABLET, EXTENDED RELEASE ORAL
Qty: 90 TABLET | Refills: 0 | Status: SHIPPED | OUTPATIENT
Start: 2022-10-03 | End: 2023-10-11

## 2022-10-21 DIAGNOSIS — K21.9 GASTROESOPHAGEAL REFLUX DISEASE WITHOUT ESOPHAGITIS: ICD-10-CM

## 2022-10-31 NOTE — PROGRESS NOTES
"SUBJECTIVE:   Travon is a 67 year old who presents for Preventive Visit.  History htn, bph, depression, high cholesterol, morbid obesity, ed and gerd.  Memory ok and  No  Falls. Outside blood pressure readings ok. No chest pain or shortness of breath.   No nausea, vomiting or diarrhea or black/bloody stools. No urine changes or hematuria.   Emotionally doing good. Sleep overall o k.   gerd  Every other day prilosec - stable. Taking  MVI/D.   Albuterol prn was a little helpful.   Memory  Ok. No falls.   Denies testicle pain/masses/hernia. No rashes/mole changes.   Eye exam - 2 years ago.  Dentist regularly.  Had covid one year ago. Still occasionally cough. Rare albuterol. No sneezing or itching.   Fullness right upper abdominal/lower ribs. Large hematoma noted after coughing.   Father with breast cancer. No nipple lumps.   No urine changes acutely..seeing urology. Sleep overall ok. No over the counter sleep aides.     Patient has been advised of split billing requirements and indicates understanding: Yes  Are you in the first 12 months of your Medicare coverage?  No    Healthy Habits:     In general, how would you rate your overall health?  Good    Frequency of exercise:  None    Do you usually eat at least 4 servings of fruit and vegetables a day, include whole grains    & fiber and avoid regularly eating high fat or \"junk\" foods?  No    Taking medications regularly:  Yes    Medication side effects:  Not applicable    Ability to successfully perform activities of daily living:  No assistance needed    Home Safety:  No safety concerns identified    Hearing Impairment:  Difficulty following a conversation in a noisy restaurant or crowded room, difficulty following dialogue in the theater and need to ask people to speak up or repeat themselves    In the past 6 months, have you been bothered by leaking of urine? Yes    In general, how would you rate your overall mental or emotional health?  Good      PHQ-2 Total Score: " 1    Additional concerns today:  Yes  Identified Health Risks:  Answers for HPI/ROS submitted by the patient on 11/3/2022  If you checked off any problems, how difficult have these problems made it for you to do your work, take care of things at home, or get along with other people?: Not difficult at all  PHQ9 TOTAL SCORE: 3    Do you feel safe in your environment? Yes    Have you ever done Advance Care Planning? (For example, a Health Directive, POLST, or a discussion with a medical provider or your loved ones about your wishes): No, advance care planning information given to patient to review.  Patient plans to discuss their wishes with loved ones or provider.      Normal conversational hearing.   Fall risk  Fallen 2 or more times in the past year?: No  Any fall with injury in the past year?: No    Cognitive Screening   1) Repeat 3 items (Leader, Season, Table)    2) Clock draw: NORMAL  3) 3 item recall: Recalls 3 objects  Results: 3 items recalled: COGNITIVE IMPAIRMENT LESS LIKELY    Mini-CogTM Copyright KENIA Quiroz. Licensed by the author for use in [facility]; reprinted with permission (soob@[facility]). All rights reserved.      Do you have sleep apnea, excessive snoring or daytime drowsiness?: unknown    Reviewed and updated as needed this visit by clinical staff                  Reviewed and updated as needed this visit by Provider                 Social History     Tobacco Use     Smoking status: Former     Types: Cigarettes     Quit date: 1970     Years since quittin.8     Smokeless tobacco: Never     Tobacco comments:     no 2nd hand smoke exposure   Substance Use Topics     Alcohol use: Yes     Comment: occ.         Alcohol Use 2020   Prescreen: >3 drinks/day or >7 drinks/week? No   Prescreen: >3 drinks/day or >7 drinks/week? -           PROBLEMS TO ADD ON...    Patient Care Team:  Clement De Jesus MD as PCP - General  Clement De Jesus MD as Assigned PCP  Christiano Leger,  MD as MD (Urology)  Clement De Jesus MD as Referring Physician (Family Practice)  Janet Dodd PA-C as Physician Assistant (Physician Assistant)  Jarret Alicea MD as Assigned Neuroscience Provider  Jordon Magallon DO as Assigned Musculoskeletal Provider  FRANKIE Linder MD as Assigned Surgical Provider    The following health maintenance items are reviewed in Epic and correct as of today:  Health Maintenance   Topic Date Due     ANNUAL REVIEW OF HM ORDERS  Never done     HEPATITIS B IMMUNIZATION (1 of 3 - 3-dose series) Never done     AORTIC ANEURYSM SCREENING (SYSTEM ASSIGNED)  10/23/2018     PHQ-9  04/19/2022     COVID-19 Vaccine (5 - Booster for Moderna series) 06/27/2022     INFLUENZA VACCINE (1) 09/01/2022     FALL RISK ASSESSMENT  10/19/2022     MEDICARE ANNUAL WELLNESS VISIT  10/19/2022     LIPID  11/12/2022     ADVANCE CARE PLANNING  10/19/2026     DTAP/TDAP/TD IMMUNIZATION (3 - Td or Tdap) 06/04/2029     COLORECTAL CANCER SCREENING  02/04/2031     HEPATITIS C SCREENING  Completed     DEPRESSION ACTION PLAN  Completed     Pneumococcal Vaccine: 65+ Years  Completed     ZOSTER IMMUNIZATION  Completed     IPV IMMUNIZATION  Aged Out     MENINGITIS IMMUNIZATION  Aged Out     Lab work is in process          Review of Systems   Constitutional: Negative for chills and fever.   HENT: Positive for hearing loss. Negative for congestion, ear pain and sore throat.    Eyes: Positive for visual disturbance. Negative for pain.   Respiratory: Negative for cough and shortness of breath.    Cardiovascular: Negative for chest pain, palpitations and peripheral edema.   Gastrointestinal: Negative for abdominal pain, constipation, diarrhea, heartburn, hematochezia and nausea.   Genitourinary: Positive for frequency, impotence and urgency. Negative for dysuria, genital sores, hematuria and penile discharge.   Musculoskeletal: Negative for arthralgias, joint swelling and myalgias.   Skin: Negative for rash.  "  Neurological: Positive for paresthesias. Negative for dizziness, weakness and headaches.   Psychiatric/Behavioral: Negative for mood changes. The patient is not nervous/anxious.        OBJECTIVE:   There were no vitals taken for this visit. Estimated body mass index is 38.02 kg/m  as calculated from the following:    Height as of 4/20/22: 1.778 m (5' 10\").    Weight as of 4/20/22: 120.2 kg (265 lb).   /83   Pulse 64   Temp 98.7  F (37.1  C) (Oral)   Resp 18   Wt 132.5 kg (292 lb)   SpO2 97%   BMI 41.90 kg/m     Physical Exam  GENERAL: healthy, alert and no distress  EYES: Eyes grossly normal to inspection, PERRL and conjunctivae and sclerae normal  HENT: ear canals and TM's normal, nose and mouth without ulcers or lesions  NECK: no adenopathy, no asymmetry, masses, or scars and thyroid normal to palpation  RESP: lungs clear to auscultation - no rales, rhonchi or wheezes  BREAST: normal without masses, tenderness or nipple discharge and no palpable axillary masses or adenopathy  CV: regular rate and rhythm, normal S1 S2, no S3 or S4, no murmur, click or rub, no peripheral edema and peripheral pulses strong  ABDOMEN: soft, nontender, no hepatosplenomegaly, no masses and bowel sounds normal  Some fullness right lateral lower ribs area   (male): patient deferred  /rectal exams  MS: no gross musculoskeletal defects noted, no edema  SKIN: no suspicious lesions or rashes  NEURO: Normal strength and tone, mentation intact and speech normal  PSYCH: mentation appears normal, affect normal/bright  LYMPH: no cervical, supraclavicular, axillary adenopathy    ASSESSMENT / PLAN:   ASSESSMENT / PLAN:  (Z00.00) Encounter for Medicare annual wellness exam  (primary encounter diagnosis)  Comment: generally healthy and normal exam. No falls and memory ok  Plan: CBC with platelets, Comprehensive metabolic         panel        Await labs. Reviewed self mole/testicle check handout.  Continue vitaminD/mvi. Exercise " "more    (E78.5) Hyperlipidemia LDL goal <130  Plan: Comprehensive metabolic panel, atorvastatin         (LIPITOR) 80 MG tablet        Recheck in 6 months  Fasting. Chest pain or shortness of breath to er.     (F33.1) Major depressive disorder, recurrent episode, moderate (H)  Comment: stable  Plan: buPROPion (WELLBUTRIN SR) 100 MG 12 hr tablet,         FLUoxetine (PROZAC) 20 MG capsule        Exercise. If SUICIAL IDEATION OR HOMOCIDAL IDEATION OR YAKOV TO ER     (E66.01) Morbid obesity (H)  Comment: needs help  Plan: exercise and lower carb diet.     (I10) Hypertension goal BP (blood pressure) < 140/90  Comment: stable  Plan: Comprehensive metabolic panel, UA with         Microscopic reflex to Culture, amLODIPine         (NORVASC) 5 MG tablet        Self-monitor. Recheck in 6 months  Sooner if worse    (K21.9) Gastroesophageal reflux disease without esophagitis  Comment: stable  Plan: omeprazole (PRILOSEC) 20 MG DR capsule        egd if worse. Diet and weight loss.     (R07.81) Rib pain on right side  Comment: fullness. Likely strain  Plan: XR Ribs & Chest Right G/E 3 Views        Surgery follow-up if worse/growing/etc. Await rad report. Closely monitor. Recheck in 6 months  Sooner if worse Expected course and warning signs reviewed. Call/email with questions/concerns         Patient has been advised of split billing requirements and indicates understanding: Yes      COUNSELING:  Reviewed preventive health counseling, as reflected in patient instructions       Regular exercise       Healthy diet/nutrition       Vision screening       Hearing screening       Dental care       Bladder control       Fall risk prevention       Alcohol Use        Colon cancer screening       Prostate cancer screening    Estimated body mass index is 38.02 kg/m  as calculated from the following:    Height as of 4/20/22: 1.778 m (5' 10\").    Weight as of 4/20/22: 120.2 kg (265 lb).    Weight management plan: Discussed healthy diet and " exercise guidelines    He reports that he quit smoking about 51 years ago. He has never used smokeless tobacco.      Appropriate preventive services were discussed with this patient, including applicable screening as appropriate for cardiovascular disease, diabetes, osteopenia/osteoporosis, and glaucoma.  As appropriate for age/gender, discussed screening for colorectal cancer, prostate cancer, breast cancer, and cervical cancer. Checklist reviewing preventive services available has been given to the patient.    Reviewed patients plan of care and provided an AVS. The Intermediate Care Plan ( asthma action plan, low back pain action plan, and migraine action plan) for Tad meets the Care Plan requirement. This Care Plan has been established and reviewed with the Patient.    Counseling Resources:  ATP IV Guidelines  Pooled Cohorts Equation Calculator  Breast Cancer Risk Calculator  Breast Cancer: Medication to Reduce Risk  FRAX Risk Assessment  ICSI Preventive Guidelines  Dietary Guidelines for Americans, 2010  USDA's MyPlate  ASA Prophylaxis  Lung CA Screening    Clement De Jesus MD  [facility]

## 2022-10-31 NOTE — PATIENT INSTRUCTIONS
Patient Education   Personalized Prevention Plan  You are due for the preventive services outlined below.  Your care team is available to assist you in scheduling these services.  If you have already completed any of these items, please share that information with your care team to update in your medical record.  Health Maintenance Due   Topic Date Due     ANNUAL REVIEW OF HM ORDERS  Never done     Hepatitis B Vaccine (1 of 3 - 3-dose series) Never done     AORTIC ANEURYSM SCREENING (SYSTEM ASSIGNED)  10/23/2018     Depression Assessment  04/19/2022     COVID-19 Vaccine (5 - Booster for Moderna series) 06/27/2022     Flu Vaccine (1) 09/01/2022     FALL RISK ASSESSMENT  10/19/2022     Annual Wellness Visit  10/19/2022     Cholesterol Lab  11/12/2022

## 2022-11-03 ENCOUNTER — OFFICE VISIT (OUTPATIENT)
Dept: FAMILY MEDICINE | Facility: CLINIC | Age: 67
End: 2022-11-03
Payer: COMMERCIAL

## 2022-11-03 ENCOUNTER — ANCILLARY PROCEDURE (OUTPATIENT)
Dept: GENERAL RADIOLOGY | Facility: CLINIC | Age: 67
End: 2022-11-03
Attending: FAMILY MEDICINE
Payer: COMMERCIAL

## 2022-11-03 VITALS
WEIGHT: 292 LBS | SYSTOLIC BLOOD PRESSURE: 138 MMHG | DIASTOLIC BLOOD PRESSURE: 83 MMHG | BODY MASS INDEX: 41.9 KG/M2 | OXYGEN SATURATION: 97 % | HEART RATE: 64 BPM | RESPIRATION RATE: 18 BRPM | TEMPERATURE: 98.7 F

## 2022-11-03 DIAGNOSIS — E66.01 MORBID OBESITY (H): ICD-10-CM

## 2022-11-03 DIAGNOSIS — F33.1 MAJOR DEPRESSIVE DISORDER, RECURRENT EPISODE, MODERATE (H): ICD-10-CM

## 2022-11-03 DIAGNOSIS — Z00.00 ENCOUNTER FOR MEDICARE ANNUAL WELLNESS EXAM: Primary | ICD-10-CM

## 2022-11-03 DIAGNOSIS — I10 HYPERTENSION GOAL BP (BLOOD PRESSURE) < 140/90: ICD-10-CM

## 2022-11-03 DIAGNOSIS — R07.81 RIB PAIN ON RIGHT SIDE: ICD-10-CM

## 2022-11-03 DIAGNOSIS — E78.5 HYPERLIPIDEMIA LDL GOAL <130: ICD-10-CM

## 2022-11-03 DIAGNOSIS — K21.9 GASTROESOPHAGEAL REFLUX DISEASE WITHOUT ESOPHAGITIS: ICD-10-CM

## 2022-11-03 LAB
ALBUMIN UR-MCNC: NEGATIVE MG/DL
APPEARANCE UR: CLEAR
BILIRUB UR QL STRIP: NEGATIVE
COLOR UR AUTO: YELLOW
ERYTHROCYTE [DISTWIDTH] IN BLOOD BY AUTOMATED COUNT: 13.1 % (ref 10–15)
GLUCOSE UR STRIP-MCNC: NEGATIVE MG/DL
HCT VFR BLD AUTO: 45.8 % (ref 40–53)
HGB BLD-MCNC: 15.3 G/DL (ref 13.3–17.7)
HGB UR QL STRIP: NEGATIVE
KETONES UR STRIP-MCNC: NEGATIVE MG/DL
LEUKOCYTE ESTERASE UR QL STRIP: NEGATIVE
MCH RBC QN AUTO: 29.6 PG (ref 26.5–33)
MCHC RBC AUTO-ENTMCNC: 33.4 G/DL (ref 31.5–36.5)
MCV RBC AUTO: 89 FL (ref 78–100)
MUCOUS THREADS #/AREA URNS LPF: PRESENT /LPF
NITRATE UR QL: NEGATIVE
PH UR STRIP: 6 [PH] (ref 5–7)
PLATELET # BLD AUTO: 190 10E3/UL (ref 150–450)
RBC # BLD AUTO: 5.17 10E6/UL (ref 4.4–5.9)
RBC #/AREA URNS AUTO: ABNORMAL /HPF
SP GR UR STRIP: 1.02 (ref 1–1.03)
SQUAMOUS #/AREA URNS AUTO: ABNORMAL /LPF
UROBILINOGEN UR STRIP-ACNC: 1 E.U./DL
WBC # BLD AUTO: 6.7 10E3/UL (ref 4–11)
WBC #/AREA URNS AUTO: ABNORMAL /HPF

## 2022-11-03 PROCEDURE — 71101 X-RAY EXAM UNILAT RIBS/CHEST: CPT | Mod: TC | Performed by: RADIOLOGY

## 2022-11-03 PROCEDURE — 85027 COMPLETE CBC AUTOMATED: CPT | Performed by: FAMILY MEDICINE

## 2022-11-03 PROCEDURE — 80053 COMPREHEN METABOLIC PANEL: CPT | Performed by: FAMILY MEDICINE

## 2022-11-03 PROCEDURE — G0439 PPPS, SUBSEQ VISIT: HCPCS | Performed by: FAMILY MEDICINE

## 2022-11-03 PROCEDURE — 36415 COLL VENOUS BLD VENIPUNCTURE: CPT | Performed by: FAMILY MEDICINE

## 2022-11-03 PROCEDURE — 81001 URINALYSIS AUTO W/SCOPE: CPT | Performed by: FAMILY MEDICINE

## 2022-11-03 PROCEDURE — 99214 OFFICE O/P EST MOD 30 MIN: CPT | Mod: 25 | Performed by: FAMILY MEDICINE

## 2022-11-03 RX ORDER — AMLODIPINE BESYLATE 5 MG/1
TABLET ORAL
Qty: 90 TABLET | Refills: 1 | Status: SHIPPED | OUTPATIENT
Start: 2022-11-03 | End: 2023-09-11

## 2022-11-03 RX ORDER — BUPROPION HYDROCHLORIDE 100 MG/1
TABLET, EXTENDED RELEASE ORAL
Qty: 90 TABLET | Refills: 1 | Status: SHIPPED | OUTPATIENT
Start: 2022-11-03 | End: 2023-07-11

## 2022-11-03 RX ORDER — ATORVASTATIN CALCIUM 80 MG/1
TABLET, FILM COATED ORAL
Qty: 90 TABLET | Refills: 3 | Status: SHIPPED | OUTPATIENT
Start: 2022-11-03 | End: 2023-10-22

## 2022-11-03 ASSESSMENT — ENCOUNTER SYMPTOMS
COUGH: 0
NERVOUS/ANXIOUS: 0
PALPITATIONS: 0
FREQUENCY: 1
ABDOMINAL PAIN: 0
CONSTIPATION: 0
FEVER: 0
HEADACHES: 0
HEARTBURN: 0
PARESTHESIAS: 1
JOINT SWELLING: 0
NAUSEA: 0
HEMATOCHEZIA: 0
SHORTNESS OF BREATH: 0
EYE PAIN: 0
CHILLS: 0
ARTHRALGIAS: 0
WEAKNESS: 0
MYALGIAS: 0
DYSURIA: 0
DIARRHEA: 0
SORE THROAT: 0
HEMATURIA: 0
DIZZINESS: 0

## 2022-11-03 ASSESSMENT — PAIN SCALES - GENERAL: PAINLEVEL: NO PAIN (0)

## 2022-11-03 ASSESSMENT — ACTIVITIES OF DAILY LIVING (ADL): CURRENT_FUNCTION: NO ASSISTANCE NEEDED

## 2022-11-03 ASSESSMENT — PATIENT HEALTH QUESTIONNAIRE - PHQ9
SUM OF ALL RESPONSES TO PHQ QUESTIONS 1-9: 3
10. IF YOU CHECKED OFF ANY PROBLEMS, HOW DIFFICULT HAVE THESE PROBLEMS MADE IT FOR YOU TO DO YOUR WORK, TAKE CARE OF THINGS AT HOME, OR GET ALONG WITH OTHER PEOPLE: NOT DIFFICULT AT ALL
SUM OF ALL RESPONSES TO PHQ QUESTIONS 1-9: 3

## 2022-11-04 LAB
ALBUMIN SERPL-MCNC: 4 G/DL (ref 3.4–5)
ALP SERPL-CCNC: 93 U/L (ref 40–150)
ALT SERPL W P-5'-P-CCNC: 35 U/L (ref 0–70)
ANION GAP SERPL CALCULATED.3IONS-SCNC: 2 MMOL/L (ref 3–14)
AST SERPL W P-5'-P-CCNC: 15 U/L (ref 0–45)
BILIRUB SERPL-MCNC: 1.3 MG/DL (ref 0.2–1.3)
BUN SERPL-MCNC: 24 MG/DL (ref 7–30)
CALCIUM SERPL-MCNC: 8.9 MG/DL (ref 8.5–10.1)
CHLORIDE BLD-SCNC: 110 MMOL/L (ref 94–109)
CO2 SERPL-SCNC: 30 MMOL/L (ref 20–32)
CREAT SERPL-MCNC: 1.06 MG/DL (ref 0.66–1.25)
GFR SERPL CREATININE-BSD FRML MDRD: 76 ML/MIN/1.73M2
GLUCOSE BLD-MCNC: 85 MG/DL (ref 70–99)
POTASSIUM BLD-SCNC: 4.5 MMOL/L (ref 3.4–5.3)
PROT SERPL-MCNC: 6.6 G/DL (ref 6.8–8.8)
SODIUM SERPL-SCNC: 142 MMOL/L (ref 133–144)

## 2022-11-25 ENCOUNTER — PRE VISIT (OUTPATIENT)
Dept: UROLOGY | Facility: CLINIC | Age: 67
End: 2022-11-25

## 2022-11-25 NOTE — TELEPHONE ENCOUNTER
Reason for visit: Cystoscopy      Relevant information: slowing stream, evaluate for urethral stricture and prostate size    Records/imaging/labs/orders: UDS available    Pt called: No need for a call    At Rooming: jimi Haney CMA  11/25/2022  9:01 AM

## 2022-12-05 ENCOUNTER — OFFICE VISIT (OUTPATIENT)
Dept: UROLOGY | Facility: CLINIC | Age: 67
End: 2022-12-05
Payer: COMMERCIAL

## 2022-12-05 VITALS
HEART RATE: 77 BPM | SYSTOLIC BLOOD PRESSURE: 141 MMHG | WEIGHT: 282 LBS | DIASTOLIC BLOOD PRESSURE: 87 MMHG | HEIGHT: 70 IN | BODY MASS INDEX: 40.37 KG/M2

## 2022-12-05 DIAGNOSIS — N13.8 BPH WITH URINARY OBSTRUCTION: ICD-10-CM

## 2022-12-05 DIAGNOSIS — N40.1 BPH WITH URINARY OBSTRUCTION: ICD-10-CM

## 2022-12-05 DIAGNOSIS — Z87.448 HISTORY OF URETHRAL STRICTURE: Primary | ICD-10-CM

## 2022-12-05 PROCEDURE — 52000 CYSTOURETHROSCOPY: CPT | Performed by: UROLOGY

## 2022-12-05 ASSESSMENT — PAIN SCALES - GENERAL: PAINLEVEL: NO PAIN (0)

## 2022-12-05 NOTE — PROGRESS NOTES
Male Cystoscopy Procedure Note     PRE-PROCEDURE DIAGNOSIS: h/o urethral stricture and BPH with LUTS  POST-PROCEDURE DIAGNOSIS: same  PROCEDURE: cystoscopy    HISTORY: Tad Olivas is a 67 year old man with LUTS:  2016: 7cm dorsal BMG urethroplasty by me   2017: slow flow with normal cysto --> treated for presumed BPH with finasteride and Flomax  2019 : LUTS --> UDS showing weak detrusor and slow flow. Didn't see me afterwards but was managed for presumed BPH  2022: still has slow flow. Only gets good flow with very full bladder. Worse flow when he forgets his Flomax. Unclear why he would have the weak detrusor (no DM or other neurologic conditions). Complains of buried penis --> makes it hard to direct stream.     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 No scarring or inflammation.     After informed consent was obtained, the patient was brought to the procedure room where he was placed in the supine position with all pressure points well padded.  The penis and scrotum were prepped and draped in a sterile fashion. A flexible cystoscope was introduced through a well-lubricated urethra.  The anterior urethra was significant for 16F rings in bulbar urethra x 3. These accommodated the scope.  The urinary sphincter was intact. The prostate demonstrated enlargement 4-5cm longth. Bladder neck was open. Bladder was free of diverticuli, cellules, trabeculation, tumors or stones.The flexible cystoscope was removed and the findings were described to the patient.     ASSESSMENT AND PLAN:  67 year old man with slow flow from BPH vs. Weak detrusor. No stricture recurrence to explain symptoms. He had rUTIs before the urethroplasty and those have not returned.  Discussed Interstim or CIC for weak detrusor vs. TURP for BPH. He says slow flow is not bad enough that he would consider any of these therapies. If he changes his mind in the future then we may want to  consider repeat UDS before Interstim of TURP.  His real complaint is that buried penis makes it hard for him to hold his penis while voiding and he can't control the direction of his stream. He would consider surgery for this. So will refer to Pallavi.

## 2022-12-05 NOTE — LETTER
"12/5/2022       RE: Tad Olivas  [address] [facility] [id number]     Dear Colleague,    Thank you for referring your patient, Tad Olivas, to the [facility] [place] [facility]. Please see a copy of my visit note below.    Chief Complaint   Patient presents with     Cystoscopy       Blood pressure (!) 141/87, pulse 77, height 1.778 m (5' 10\"), weight 127.9 kg (282 lb). Body mass index is 40.46 kg/m .    Patient Active Problem List   Diagnosis     Biceps tendon tear     HYPERLIPIDEMIA LDL GOAL <130     Elbow pain     Moderate major depression (H)     Erectile dysfunction     Gross hematuria     Urethritis     Advanced directives, counseling/discussion     Renal cysts, acquired, bilateral     Decreased urine stream     Depression     Major depressive disorder, recurrent episode, moderate (H)     Urethral stricture     Hypertension goal BP (blood pressure) < 140/90     History of urethral stricture     Gastroesophageal reflux disease without esophagitis     Morbid obesity (H)     Carpal tunnel syndrome of right wrist       No Known Allergies    Current Outpatient Medications   Medication Sig Dispense Refill     amLODIPine (NORVASC) 5 MG tablet TAKE ONE TABLET BY MOUTH ONCE DAILY 90 tablet 1     amoxicillin (AMOXIL) 500 MG capsule Take 4 capsules (2,000 mg) by mouth daily For one dose prior to dental procedures 4 capsule 0     ascorbic acid (VITAMIN C) 500 MG tablet Take 1 tablet by mouth daily. 100 tablet 3     atorvastatin (LIPITOR) 80 MG tablet TAKE ONE TABLET BY MOUTH ONCE DAILY FOR CHOLESTEROL 90 tablet 3     buPROPion (WELLBUTRIN SR) 100 MG 12 hr tablet TAKE ONE TABLET BY MOUTH EVERY MORNING 90 tablet 1     buPROPion (WELLBUTRIN SR) 100 MG 12 hr tablet TAKE ONE TABLET BY MOUTH EVERY MORNING FOR ENERGY/ WEIGHT LOSS/ DEPRESSION 90 tablet 0     Cholecalciferol (VITAMIN D3 PO) Take 2,000 Units by mouth daily       finasteride " (PROSCAR) 5 MG tablet Take 1 tablet (5 mg) by mouth daily 90 tablet 3     FLUoxetine (PROZAC) 20 MG capsule Take 1 capsule (20 mg) by mouth daily 90 capsule 1     Glucosamine-Chondroit-Vit C-Mn (GLUCOSAMINE-CHONDROITINOITIN) CAPS Take 1 each by mouth daily. 90 capsule 3     Multiple vitamin TABS Take 1 tablet by mouth daily. 90 tablet 3     Naproxen Sodium (ALEVE PO) Take 220 mg by mouth daily        omeprazole (PRILOSEC) 20 MG DR capsule TAKE ONE CAPSULE BY MOUTH EVERY OTHER DAY 30-60 MINUTES BEFORE A MEAL FOR HEARTBURN 45 capsule 3     sildenafil (VIAGRA) 50 MG tablet Take 1-2 tablets ( mg) by mouth as needed (30-60 minutes prior to sexual activity) 30 tablet 3     tamsulosin (FLOMAX) 0.4 MG capsule Take 1 capsule (0.4 mg) by mouth daily 90 capsule 3       Social History     Tobacco Use     Smoking status: Former     Types: Cigarettes     Quit date: 1970     Years since quittin.9     Smokeless tobacco: Never     Tobacco comments:     no 2nd hand smoke exposure   Vaping Use     Vaping Use: Never used   Substance Use Topics     Alcohol use: Yes     Comment: occ.     Drug use: No       Invasive Procedure Safety Checklist:    Procedure: Cystoscopy    Action: Complete sections and checkboxes as appropriate.    Pre-procedure:  1. Patient ID Verified with 2 identifiers (Jayde and  or MRN) : YES    2. Procedure and site verified with patient/designee (when able) : YES    3. Accurate consent documentation in medical record : YES    4. H&P (or appropriate assessment) documented in medical record : N/A  H&P must be up to 30 days prior to procedure an updated within 24 hours of                 Procedure as applicable.     5. Relevant diagnostic and radiology test results appropriately labeled and displayed as applicable : YES    6. Blood products, implants, devices, and/or special equipment available for the procedure as applicable : YES    7. Procedure site(s) marked with provider initials [Exclusions:  none] : NO    8. Marking not required. Reason : Yes  Procedure does not require site marking    Time Out:     Time-Out performed immediately prior to starting procedure, including verbal and active participation of all team members addressing: YES    1. Correct patient identity.  2. Confirmed that the correct side and site are marked.  3. An accurate procedure to be done.  4. Agreement on the procedure to be done.  5. Correct patient position.  6. Relevant images and results are properly labeled and appropriately displayed.  7. The need to administer antibiotics or fluids for irrigation purposes during the procedure as applicable.  8. Safety precautions based on patient history or medication use.    During Procedure: Verification of correct person, site, and procedure occurs any time the responsibility for care of the patient is transferred to another member of the care team.      Michelle Lim  12/5/2022    Male Cystoscopy Procedure Note     PRE-PROCEDURE DIAGNOSIS: h/o urethral stricture and BPH with LUTS  POST-PROCEDURE DIAGNOSIS: same  PROCEDURE: cystoscopy    HISTORY: Tad Olivas is a 67 year old man with LUTS:  2016: 7cm dorsal BMG urethroplasty by me   2017: slow flow with normal cysto --> treated for presumed BPH with finasteride and Flomax  2019 : LUTS --> UDS showing weak detrusor and slow flow. Didn't see me afterwards but was managed for presumed BPH  2022: still has slow flow. Only gets good flow with very full bladder. Worse flow when he forgets his Flomax. Unclear why he would have the weak detrusor (no DM or other neurologic conditions). Complains of buried penis --> makes it hard to direct stream.     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 No scarring or inflammation.     After informed consent was obtained, the patient was brought to the procedure room where he was placed in the supine position with all pressure points well  padded.  The penis and scrotum were prepped and draped in a sterile fashion. A flexible cystoscope was introduced through a well-lubricated urethra.  The anterior urethra was significant for 16F rings in bulbar urethra x 3. These accommodated the scope.  The urinary sphincter was intact. The prostate demonstrated enlargement 4-5cm longth. Bladder neck was open. Bladder was free of diverticuli, cellules, trabeculation, tumors or stones.The flexible cystoscope was removed and the findings were described to the patient.     ASSESSMENT AND PLAN:  67 year old man with slow flow from BPH vs. Weak detrusor. No stricture recurrence to explain symptoms. He had rUTIs before the urethroplasty and those have not returned.  Discussed Interstim or CIC for weak detrusor vs. TURP for BPH. He says slow flow is not bad enough that he would consider any of these therapies. If he changes his mind in the future then we may want to consider repeat UDS before Interstim of TURP.  His real complaint is that buried penis makes it hard for him to hold his penis while voiding and he can't control the direction of his stream. He would consider surgery for this. So will refer to Pallavi.      Christiano Leger MD

## 2022-12-05 NOTE — PROGRESS NOTES
"Chief Complaint   Patient presents with     Cystoscopy       Blood pressure (!) 141/87, pulse 77, height 1.778 m (5' 10\"), weight 127.9 kg (282 lb). Body mass index is 40.46 kg/m .    Patient Active Problem List   Diagnosis     Biceps tendon tear     HYPERLIPIDEMIA LDL GOAL <130     Elbow pain     Moderate major depression (H)     Erectile dysfunction     Gross hematuria     Urethritis     Advanced directives, counseling/discussion     Renal cysts, acquired, bilateral     Decreased urine stream     Depression     Major depressive disorder, recurrent episode, moderate (H)     Urethral stricture     Hypertension goal BP (blood pressure) < 140/90     History of urethral stricture     Gastroesophageal reflux disease without esophagitis     Morbid obesity (H)     Carpal tunnel syndrome of right wrist       No Known Allergies    Current Outpatient Medications   Medication Sig Dispense Refill     amLODIPine (NORVASC) 5 MG tablet TAKE ONE TABLET BY MOUTH ONCE DAILY 90 tablet 1     amoxicillin (AMOXIL) 500 MG capsule Take 4 capsules (2,000 mg) by mouth daily For one dose prior to dental procedures 4 capsule 0     ascorbic acid (VITAMIN C) 500 MG tablet Take 1 tablet by mouth daily. 100 tablet 3     atorvastatin (LIPITOR) 80 MG tablet TAKE ONE TABLET BY MOUTH ONCE DAILY FOR CHOLESTEROL 90 tablet 3     buPROPion (WELLBUTRIN SR) 100 MG 12 hr tablet TAKE ONE TABLET BY MOUTH EVERY MORNING 90 tablet 1     buPROPion (WELLBUTRIN SR) 100 MG 12 hr tablet TAKE ONE TABLET BY MOUTH EVERY MORNING FOR ENERGY/ WEIGHT LOSS/ DEPRESSION 90 tablet 0     Cholecalciferol (VITAMIN D3 PO) Take 2,000 Units by mouth daily       finasteride (PROSCAR) 5 MG tablet Take 1 tablet (5 mg) by mouth daily 90 tablet 3     FLUoxetine (PROZAC) 20 MG capsule Take 1 capsule (20 mg) by mouth daily 90 capsule 1     Glucosamine-Chondroit-Vit C-Mn (GLUCOSAMINE-CHONDROITINOITIN) CAPS Take 1 each by mouth daily. 90 capsule 3     Multiple vitamin TABS Take 1 tablet by " mouth daily. 90 tablet 3     Naproxen Sodium (ALEVE PO) Take 220 mg by mouth daily        omeprazole (PRILOSEC) 20 MG DR capsule TAKE ONE CAPSULE BY MOUTH EVERY OTHER DAY 30-60 MINUTES BEFORE A MEAL FOR HEARTBURN 45 capsule 3     sildenafil (VIAGRA) 50 MG tablet Take 1-2 tablets ( mg) by mouth as needed (30-60 minutes prior to sexual activity) 30 tablet 3     tamsulosin (FLOMAX) 0.4 MG capsule Take 1 capsule (0.4 mg) by mouth daily 90 capsule 3       Social History     Tobacco Use     Smoking status: Former     Types: Cigarettes     Quit date: 1970     Years since quittin.9     Smokeless tobacco: Never     Tobacco comments:     no 2nd hand smoke exposure   Vaping Use     Vaping Use: Never used   Substance Use Topics     Alcohol use: Yes     Comment: occ.     Drug use: No       Invasive Procedure Safety Checklist:    Procedure: Cystoscopy    Action: Complete sections and checkboxes as appropriate.    Pre-procedure:  1. Patient ID Verified with 2 identifiers (Jayde and  or MRN) : YES    2. Procedure and site verified with patient/designee (when able) : YES    3. Accurate consent documentation in medical record : YES    4. H&P (or appropriate assessment) documented in medical record : N/A  H&P must be up to 30 days prior to procedure an updated within 24 hours of                 Procedure as applicable.     5. Relevant diagnostic and radiology test results appropriately labeled and displayed as applicable : YES    6. Blood products, implants, devices, and/or special equipment available for the procedure as applicable : YES    7. Procedure site(s) marked with provider initials [Exclusions: none] : NO    8. Marking not required. Reason : Yes  Procedure does not require site marking    Time Out:     Time-Out performed immediately prior to starting procedure, including verbal and active participation of all team members addressing: YES    1. Correct patient identity.  2. Confirmed that the correct side  and site are marked.  3. An accurate procedure to be done.  4. Agreement on the procedure to be done.  5. Correct patient position.  6. Relevant images and results are properly labeled and appropriately displayed.  7. The need to administer antibiotics or fluids for irrigation purposes during the procedure as applicable.  8. Safety precautions based on patient history or medication use.    During Procedure: Verification of correct person, site, and procedure occurs any time the responsibility for care of the patient is transferred to another member of the care team.      iMchelle Lim  12/5/2022

## 2022-12-05 NOTE — PATIENT INSTRUCTIONS
"Schedule an in person visit with Dr. Olivo to discuss surgical options.      AFTER YOUR CYSTOSCOPY        You have just completed a cystoscopy, or \"cysto\", which allowed your physician to learn more about your bladder (or to remove a stent placed after surgery). We suggest that you continue to avoid caffeine, fruit juice, and alcohol for the next 24 hours, however, you are encouraged to return to your normal activities.         A few things that are considered normal after your cystoscopy:     * Small amount of bleeding (or spotting) that clears within the next 24 hours     * Slight burning sensation with urination     * Sensation to of needing to avoid more frequently     * The feeling of \"air\" in your urine     * Mild discomfort that is relieved with Tylenol        Please contact our office promptly if you:     * Develop a fever above 101 degrees     * Are unable to urinate     * Develop bright red blood that does not stop     * Severe pain or swelling         Please contact our office with any concerns or questions @[email or web address].  "

## 2023-02-08 ENCOUNTER — OFFICE VISIT (OUTPATIENT)
Dept: UROLOGY | Facility: CLINIC | Age: 68
End: 2023-02-08
Payer: COMMERCIAL

## 2023-02-08 VITALS — DIASTOLIC BLOOD PRESSURE: 92 MMHG | SYSTOLIC BLOOD PRESSURE: 149 MMHG | HEART RATE: 84 BPM

## 2023-02-08 DIAGNOSIS — N52.33 ERECTILE DYSFUNCTION FOLLOWING URETHRAL SURGERY: Primary | ICD-10-CM

## 2023-02-08 PROCEDURE — 99213 OFFICE O/P EST LOW 20 MIN: CPT | Performed by: UROLOGY

## 2023-02-08 RX ORDER — TADALAFIL 5 MG/1
5 TABLET ORAL DAILY
Qty: 90 TABLET | Refills: 3 | Status: SHIPPED | OUTPATIENT
Start: 2023-02-08 | End: 2023-07-26

## 2023-02-08 NOTE — PROGRESS NOTES
HPI:  Tad Olivas is a 67 year old male with     2016: 7cm dorsal BMG urethroplasty by Arsen  2017: slow flow with normal cysto --> treated for presumed BPH with finasteride and Flomax  2019 : LUTS --> UDS showing weak detrusor and slow flow. Didn't see me afterwards but was managed for presumed BPH  2022: still has slow flow. Only gets good flow with very full bladder. Worse flow when he forgets his Flomax. Unclear why he would have the weak detrusor (no DM or other neurologic conditions). Complains of buried penis --> makes it hard to direct stream.   He had a cystoscopy in December 2022 which shows some bands but scope passes into bladder.  He also has ED and tried Viagra but cost prohibitive in the past.      Exam:  BP (!) 149/92   Pulse 84    NAD  Penis is somewhat buried at rest though easily exposed glans. No scarring. Mild suprapubic pannus. Mild enlarged scrotum.  No phimosis      Assessment & Plan   ED  Mild buried penis without     [facility] for [facility] and urinary flow. Risks, benefits discussed.    Discussed I recommend no surgery for this patient.    José Miguel Olivo MD  Reconstructive Urology  [facility]

## 2023-02-08 NOTE — NURSING NOTE
Chief Complaint   Patient presents with     Follow Up       Blood pressure (!) 149/92, pulse 84. There is no height or weight on file to calculate BMI.    Patient Active Problem List   Diagnosis     Biceps tendon tear     HYPERLIPIDEMIA LDL GOAL <130     Elbow pain     Moderate major depression (H)     Erectile dysfunction     Gross hematuria     Urethritis     Advanced directives, counseling/discussion     Renal cysts, acquired, bilateral     Decreased urine stream     Depression     Major depressive disorder, recurrent episode, moderate (H)     Urethral stricture     Hypertension goal BP (blood pressure) < 140/90     History of urethral stricture     Gastroesophageal reflux disease without esophagitis     Morbid obesity (H)     Carpal tunnel syndrome of right wrist       No Known Allergies    Current Outpatient Medications   Medication Sig Dispense Refill     amLODIPine (NORVASC) 5 MG tablet TAKE ONE TABLET BY MOUTH ONCE DAILY 90 tablet 1     amoxicillin (AMOXIL) 500 MG capsule Take 4 capsules (2,000 mg) by mouth daily For one dose prior to dental procedures 4 capsule 0     ascorbic acid (VITAMIN C) 500 MG tablet Take 1 tablet by mouth daily. 100 tablet 3     atorvastatin (LIPITOR) 80 MG tablet TAKE ONE TABLET BY MOUTH ONCE DAILY FOR CHOLESTEROL 90 tablet 3     buPROPion (WELLBUTRIN SR) 100 MG 12 hr tablet TAKE ONE TABLET BY MOUTH EVERY MORNING 90 tablet 1     buPROPion (WELLBUTRIN SR) 100 MG 12 hr tablet TAKE ONE TABLET BY MOUTH EVERY MORNING FOR ENERGY/ WEIGHT LOSS/ DEPRESSION 90 tablet 0     Cholecalciferol (VITAMIN D3 PO) Take 2,000 Units by mouth daily       finasteride (PROSCAR) 5 MG tablet Take 1 tablet (5 mg) by mouth daily 90 tablet 3     FLUoxetine (PROZAC) 20 MG capsule Take 1 capsule (20 mg) by mouth daily 90 capsule 1     Glucosamine-Chondroit-Vit C-Mn (GLUCOSAMINE-CHONDROITINOITIN) CAPS Take 1 each by mouth daily. 90 capsule 3     Multiple vitamin TABS Take 1 tablet by mouth daily. 90 tablet 3      Naproxen Sodium (ALEVE PO) Take 220 mg by mouth daily        omeprazole (PRILOSEC) 20 MG DR capsule TAKE ONE CAPSULE BY MOUTH EVERY OTHER DAY 30-60 MINUTES BEFORE A MEAL FOR HEARTBURN 45 capsule 3     sildenafil (VIAGRA) 50 MG tablet Take 1-2 tablets ( mg) by mouth as needed (30-60 minutes prior to sexual activity) 30 tablet 3     tamsulosin (FLOMAX) 0.4 MG capsule Take 1 capsule (0.4 mg) by mouth daily 90 capsule 3       Social History     Tobacco Use     Smoking status: Former     Types: Cigarettes     Quit date: 1970     Years since quittin.1     Smokeless tobacco: Never     Tobacco comments:     no 2nd hand smoke exposure   Vaping Use     Vaping Use: Never used   Substance Use Topics     Alcohol use: Yes     Comment: occ.     Drug use: No       Michael Renee  2023  9:19 AM

## 2023-02-08 NOTE — LETTER
2/8/2023       RE: Tad Olivas  [address] [facility] [id number]     Dear Colleague,    Thank you for referring your patient, Tad Olivas, to the [facility] [place] [facility]. Please see a copy of my visit note below.    HPI:  Tad Olivas is a 67 year old male with     2016: 7cm dorsal BMG urethroplasty by Arsen  2017: slow flow with normal cysto --> treated for presumed BPH with finasteride and Flomax  2019 : LUTS --> UDS showing weak detrusor and slow flow. Didn't see me afterwards but was managed for presumed BPH  2022: still has slow flow. Only gets good flow with very full bladder. Worse flow when he forgets his Flomax. Unclear why he would have the weak detrusor (no DM or other neurologic conditions). Complains of buried penis --> makes it hard to direct stream.   He had a cystoscopy in December 2022 which shows some bands but scope passes into bladder.  He also has ED and tried Viagra but cost prohibitive in the past.      Exam:  BP (!) 149/92   Pulse 84    NAD  Penis is somewhat buried at rest though easily exposed glans. No scarring. Mild suprapubic pannus. Mild enlarged scrotum.  No phimosis      Assessment & Plan   ED  Mild buried penis without     I recommend Cialis 5mg daily - [facility] - for ED and urinary flow. Risks, benefits discussed.    Discussed I recommend no surgery for this patient.    José Miguel Olivo MD  Reconstructive Urology  [facility]

## 2023-04-28 DIAGNOSIS — F33.1 MAJOR DEPRESSIVE DISORDER, RECURRENT EPISODE, MODERATE (H): ICD-10-CM

## 2023-07-11 DIAGNOSIS — F33.1 MAJOR DEPRESSIVE DISORDER, RECURRENT EPISODE, MODERATE (H): ICD-10-CM

## 2023-07-11 RX ORDER — BUPROPION HYDROCHLORIDE 100 MG/1
TABLET, EXTENDED RELEASE ORAL
Qty: 90 TABLET | Refills: 0 | Status: SHIPPED | OUTPATIENT
Start: 2023-07-11 | End: 2023-10-09

## 2023-07-24 ENCOUNTER — MYC MEDICAL ADVICE (OUTPATIENT)
Dept: UROLOGY | Facility: CLINIC | Age: 68
End: 2023-07-24
Payer: COMMERCIAL

## 2023-07-26 DIAGNOSIS — N52.33 ERECTILE DYSFUNCTION FOLLOWING URETHRAL SURGERY: Primary | ICD-10-CM

## 2023-07-26 RX ORDER — TADALAFIL 5 MG/1
5 TABLET ORAL DAILY
Qty: 90 TABLET | Refills: 2 | Status: SHIPPED | OUTPATIENT
Start: 2023-07-26 | End: 2024-01-05

## 2023-07-26 NOTE — NURSING NOTE
Patient was previously getting his medication from [facility]. [facility] ran out of stock and now patient wants to try a different pharmacy. New script sent    Barbra Keane, RN, BSN  Care Coordinator Urology  [facility] [place], [place]  Urology [facility]  [phone]

## 2023-08-02 DIAGNOSIS — R39.198 SLOW URINARY STREAM: ICD-10-CM

## 2023-08-03 RX ORDER — FINASTERIDE 5 MG/1
1 TABLET, FILM COATED ORAL DAILY
Qty: 90 TABLET | Refills: 3 | Status: SHIPPED | OUTPATIENT
Start: 2023-08-03 | End: 2024-01-05

## 2023-08-03 NOTE — TELEPHONE ENCOUNTER
finasteride (PROSCAR) 5 MG tablet    Office Visit    2/8/2023  [facility] [place]   José Miguel Olivo MD  Urology         
Anemia

## 2023-09-03 ENCOUNTER — MYC MEDICAL ADVICE (OUTPATIENT)
Dept: UROLOGY | Facility: CLINIC | Age: 68
End: 2023-09-03
Payer: COMMERCIAL

## 2023-09-04 DIAGNOSIS — R39.198 SLOW URINARY STREAM: ICD-10-CM

## 2023-09-05 RX ORDER — TAMSULOSIN HYDROCHLORIDE 0.4 MG/1
0.4 CAPSULE ORAL DAILY
Qty: 90 CAPSULE | Refills: 3 | Status: SHIPPED | OUTPATIENT
Start: 2023-09-05 | End: 2024-01-05

## 2023-09-11 DIAGNOSIS — I10 HYPERTENSION GOAL BP (BLOOD PRESSURE) < 140/90: ICD-10-CM

## 2023-09-11 RX ORDER — AMLODIPINE BESYLATE 5 MG/1
TABLET ORAL
Qty: 90 TABLET | Refills: 1 | Status: SHIPPED | OUTPATIENT
Start: 2023-09-11 | End: 2024-03-04

## 2023-10-08 DIAGNOSIS — F33.1 MAJOR DEPRESSIVE DISORDER, RECURRENT EPISODE, MODERATE (H): ICD-10-CM

## 2023-10-09 RX ORDER — BUPROPION HYDROCHLORIDE 100 MG/1
TABLET, EXTENDED RELEASE ORAL
Qty: 90 TABLET | Refills: 0 | Status: SHIPPED | OUTPATIENT
Start: 2023-10-09 | End: 2024-01-08

## 2023-10-10 ENCOUNTER — MYC MEDICAL ADVICE (OUTPATIENT)
Dept: FAMILY MEDICINE | Facility: CLINIC | Age: 68
End: 2023-10-10
Payer: COMMERCIAL

## 2023-10-10 NOTE — TELEPHONE ENCOUNTER
"Please call 911 if:    SEVERE difficulty breathing (e.g., struggling for each breath, speaks in single words, pulse > 120)   Breathing stopped and hasn't returned   Choking on something   Bluish (or gray) lips or face   Difficult to awaken or acting confused (e.g., disoriented, slurred speech)   Passed out (i.e., fainted, collapsed and was not responding)   Wheezing started suddenly after medicine, an allergic food, or bee sting   Stridor (harsh sound while breathing in)   Slow, shallow and weak breathing     Please go to the E.R. now:    MODERATE difficulty breathing (e.g., speaks in phrases, SOB even at rest, pulse 100-120) of new-onset or worse than normal   Oxygen level (e.g., pulse oximetry) 90% or lower   Wheezing can be heard across the room   Drooling or spitting out saliva (because can't swallow)   Any history of prior \"blood clot\" in leg or lungs   Illness requiring prolonged bedrest in past month (e.g., immobilization, long hospital stay)   Hip or leg fracture (broken bone) in past month (or had cast on leg or ankle in past month)   Major surgery in the past month   Long-distance travel in past month (e.g., car, bus, train, plane; with trip lasting 6 or more hours)   Cancer treatment in past six months (or has cancer now)   Extra heartbeats, irregular heart beating, or heart is beating very fast (i.e., 'palpitations')   Fever > 103 F (39.4 C)   Fever > 101 F (38.3 C) and over 60 years of age  Fever > 100.0 F (37.8 C) and bedridden (e.g., nursing home patient, stroke, chronic illness, recovering from surgery)   Fever > 100.0 F (37.8 C) and diabetes mellitus or weak immune system (e.g., HIV positive, cancer chemo, splenectomy, organ transplant, chronic steroids)   Periods where breathing stops and then resumes normally and bedridden (e.g., nursing home patient, CVA)   Pregnant or postpartum (from 0 to 6 weeks after delivery)     Please be seen in office now if:    MILD difficulty breathing (e.g., minimal/no " SOB at rest, SOB with walking, pulse < 100) of new-onset or worse than normal   Longstanding difficulty breathing (e.g., CHF, COPD, emphysema) and worse than normal   Longstanding difficulty breathing and not responding to usual therapy   Continuous (nonstop) coughing   Patient wants to be seen     Please be seen in office within 3 days if:    MODERATE longstanding difficulty breathing (e.g., speaks in phrases, SOB even at rest, pulse 100-120) and SAME as normal     Please be seen in office within 2 weeks if:     MILD longstanding difficulty breathing (e.g., speaks in phrases, SOB even at rest, pulse 100-120) and SAME as normal

## 2023-10-11 ENCOUNTER — OFFICE VISIT (OUTPATIENT)
Dept: URGENT CARE | Facility: URGENT CARE | Age: 68
End: 2023-10-11
Payer: COMMERCIAL

## 2023-10-11 VITALS
RESPIRATION RATE: 16 BRPM | DIASTOLIC BLOOD PRESSURE: 84 MMHG | HEART RATE: 86 BPM | SYSTOLIC BLOOD PRESSURE: 153 MMHG | BODY MASS INDEX: 41.61 KG/M2 | OXYGEN SATURATION: 95 % | TEMPERATURE: 97.9 F | WEIGHT: 290 LBS

## 2023-10-11 DIAGNOSIS — R20.2 NUMBNESS AND TINGLING IN LEFT HAND: Primary | ICD-10-CM

## 2023-10-11 DIAGNOSIS — R20.0 NUMBNESS AND TINGLING IN LEFT HAND: Primary | ICD-10-CM

## 2023-10-11 DIAGNOSIS — R42 DIZZINESS: ICD-10-CM

## 2023-10-11 PROCEDURE — 99214 OFFICE O/P EST MOD 30 MIN: CPT | Performed by: PHYSICIAN ASSISTANT

## 2023-10-11 NOTE — PROGRESS NOTES
Assessment & Plan     Numbness and tingling in left hand  - Adult Neurology  Referral; Future  Worsening  chronic numbness.    Dizziness  Improved after lowering blood pressure meds, worse with heat    Numbness in hand sounds consistent with ulnar nerve impingement.  Advised neurology referral for possible EMG. Dizziness has improved after lowering blood pressure medications. I recommend he follow up with his primary care provider at scheduled well visit in 1 month. Ddx includes stroke, MI which are emergent however with symptoms for years- many months, workup deferred.    Return in about 1 week (around 10/18/2023) for follow up with neurology.     [facility]    Subjective   Tad Olivas is a 67 year old who presents for the following health issues     Patient presents with:  Urgent Care  Dizziness: Per patient states he talked to triage yesterday regarding symptoms and was told to come in to be assessed     HPI    Travon presents clinic today for evaluation of left hand numbness and tingling.  He states that symptoms first began approximately 9 months ago, maybe longer.  Symptoms have been progressively worsening and he noticed symptoms more often beginning this last summer, 5 months ago.  He feels numbness and tingling in the morning and with normal everyday activities such as gripping a steering wheel.  The tingling is felt in his thumb index and middle finger.  It does not travel up his arm. He has had a history of a ruptured distal biceps tendon that was repaired at his left antecubital fossa greater than 10 years ago.  Numbness feels similar to previous carpal tunnel syndrome. History of release of right carpal tunnel several years ago. He also states that he has had dizziness for several years.  His blood pressure medications were adjusted and his symptoms improved.  He did note one dizzy spell last June and a really hot day while he was working  outside.  He did quite a bit of work outside a couple weeks ago when the weather was not so warm and tolerated this just fine.    Review of Systems   ROS negative except as stated above.      Objective    BP (!) 153/84   Pulse 86   Temp 97.9  F (36.6  C) (Tympanic)   Resp 16   Wt 131.5 kg (290 lb)   SpO2 95%   BMI 41.61 kg/m    Physical Exam       No results found for any visits on 10/11/23.

## 2023-10-16 ENCOUNTER — MYC MEDICAL ADVICE (OUTPATIENT)
Dept: FAMILY MEDICINE | Facility: CLINIC | Age: 68
End: 2023-10-16
Payer: COMMERCIAL

## 2023-10-16 DIAGNOSIS — Z00.00 PREVENTATIVE HEALTH CARE: ICD-10-CM

## 2023-10-16 DIAGNOSIS — E78.5 HYPERLIPIDEMIA LDL GOAL <130: Primary | ICD-10-CM

## 2023-10-16 DIAGNOSIS — Z12.5 SCREENING PSA (PROSTATE SPECIFIC ANTIGEN): ICD-10-CM

## 2023-10-20 DIAGNOSIS — F33.1 MAJOR DEPRESSIVE DISORDER, RECURRENT EPISODE, MODERATE (H): ICD-10-CM

## 2023-10-20 DIAGNOSIS — E78.5 HYPERLIPIDEMIA LDL GOAL <130: ICD-10-CM

## 2023-10-22 RX ORDER — ATORVASTATIN CALCIUM 80 MG/1
TABLET, FILM COATED ORAL
Qty: 90 TABLET | Refills: 0 | Status: SHIPPED | OUTPATIENT
Start: 2023-10-22 | End: 2024-01-15

## 2023-10-31 ASSESSMENT — ENCOUNTER SYMPTOMS
FEVER: 0
MYALGIAS: 0
CHILLS: 0
COUGH: 0
HEMATURIA: 0
DIZZINESS: 1
ABDOMINAL PAIN: 0
FREQUENCY: 1
SORE THROAT: 0
HEARTBURN: 0
NAUSEA: 0
WEAKNESS: 0
HEMATOCHEZIA: 0
DYSURIA: 0
JOINT SWELLING: 0
NERVOUS/ANXIOUS: 0
HEADACHES: 0
DIARRHEA: 0
ARTHRALGIAS: 0
EYE PAIN: 0
CONSTIPATION: 0
PALPITATIONS: 0
PARESTHESIAS: 0
SHORTNESS OF BREATH: 1

## 2023-10-31 ASSESSMENT — ACTIVITIES OF DAILY LIVING (ADL): CURRENT_FUNCTION: NO ASSISTANCE NEEDED

## 2023-11-07 ENCOUNTER — OFFICE VISIT (OUTPATIENT)
Dept: FAMILY MEDICINE | Facility: CLINIC | Age: 68
End: 2023-11-07
Payer: COMMERCIAL

## 2023-11-07 VITALS
DIASTOLIC BLOOD PRESSURE: 80 MMHG | OXYGEN SATURATION: 95 % | WEIGHT: 290 LBS | HEIGHT: 70 IN | SYSTOLIC BLOOD PRESSURE: 135 MMHG | TEMPERATURE: 97.1 F | RESPIRATION RATE: 14 BRPM | HEART RATE: 76 BPM | BODY MASS INDEX: 41.52 KG/M2

## 2023-11-07 DIAGNOSIS — E66.01 MORBID OBESITY (H): ICD-10-CM

## 2023-11-07 DIAGNOSIS — I10 HYPERTENSION GOAL BP (BLOOD PRESSURE) < 140/90: ICD-10-CM

## 2023-11-07 DIAGNOSIS — Z00.00 ENCOUNTER FOR MEDICARE ANNUAL WELLNESS EXAM: Primary | ICD-10-CM

## 2023-11-07 DIAGNOSIS — R06.2 WHEEZING: ICD-10-CM

## 2023-11-07 DIAGNOSIS — K21.9 GASTROESOPHAGEAL REFLUX DISEASE WITHOUT ESOPHAGITIS: ICD-10-CM

## 2023-11-07 DIAGNOSIS — E78.5 HYPERLIPIDEMIA LDL GOAL <130: ICD-10-CM

## 2023-11-07 DIAGNOSIS — F33.1 MAJOR DEPRESSIVE DISORDER, RECURRENT EPISODE, MODERATE (H): ICD-10-CM

## 2023-11-07 DIAGNOSIS — H91.93 HEARING DIFFICULTY OF BOTH EARS: ICD-10-CM

## 2023-11-07 DIAGNOSIS — Z12.5 SCREENING PSA (PROSTATE SPECIFIC ANTIGEN): ICD-10-CM

## 2023-11-07 DIAGNOSIS — Z00.00 PREVENTATIVE HEALTH CARE: ICD-10-CM

## 2023-11-07 LAB
ALBUMIN SERPL BCG-MCNC: 4.2 G/DL (ref 3.5–5.2)
ALP SERPL-CCNC: 82 U/L (ref 40–129)
ALT SERPL W P-5'-P-CCNC: 25 U/L (ref 0–70)
ANION GAP SERPL CALCULATED.3IONS-SCNC: 11 MMOL/L (ref 7–15)
AST SERPL W P-5'-P-CCNC: 21 U/L (ref 0–45)
BILIRUB SERPL-MCNC: 1.3 MG/DL
BUN SERPL-MCNC: 21.8 MG/DL (ref 8–23)
CALCIUM SERPL-MCNC: 9.5 MG/DL (ref 8.8–10.2)
CHLORIDE SERPL-SCNC: 104 MMOL/L (ref 98–107)
CHOLEST SERPL-MCNC: 199 MG/DL
CREAT SERPL-MCNC: 1.25 MG/DL (ref 0.67–1.17)
DEPRECATED HCO3 PLAS-SCNC: 26 MMOL/L (ref 22–29)
EGFRCR SERPLBLD CKD-EPI 2021: 62 ML/MIN/1.73M2
GLUCOSE SERPL-MCNC: 107 MG/DL (ref 70–99)
HDLC SERPL-MCNC: 53 MG/DL
LDLC SERPL CALC-MCNC: 127 MG/DL
NONHDLC SERPL-MCNC: 146 MG/DL
PHOSPHATE SERPL-MCNC: 3.4 MG/DL (ref 2.5–4.5)
POTASSIUM SERPL-SCNC: 4.9 MMOL/L (ref 3.4–5.3)
PROT SERPL-MCNC: 6.6 G/DL (ref 6.4–8.3)
PSA SERPL DL<=0.01 NG/ML-MCNC: 1.07 NG/ML (ref 0–6.5)
SODIUM SERPL-SCNC: 141 MMOL/L (ref 135–145)
TRIGL SERPL-MCNC: 97 MG/DL

## 2023-11-07 PROCEDURE — G0439 PPPS, SUBSEQ VISIT: HCPCS | Performed by: FAMILY MEDICINE

## 2023-11-07 PROCEDURE — 99214 OFFICE O/P EST MOD 30 MIN: CPT | Mod: 25 | Performed by: FAMILY MEDICINE

## 2023-11-07 PROCEDURE — 84100 ASSAY OF PHOSPHORUS: CPT | Performed by: FAMILY MEDICINE

## 2023-11-07 PROCEDURE — G0103 PSA SCREENING: HCPCS | Performed by: FAMILY MEDICINE

## 2023-11-07 PROCEDURE — 36415 COLL VENOUS BLD VENIPUNCTURE: CPT | Performed by: FAMILY MEDICINE

## 2023-11-07 PROCEDURE — 80053 COMPREHEN METABOLIC PANEL: CPT | Performed by: FAMILY MEDICINE

## 2023-11-07 PROCEDURE — 80061 LIPID PANEL: CPT | Performed by: FAMILY MEDICINE

## 2023-11-07 RX ORDER — ALBUTEROL SULFATE 90 UG/1
2 AEROSOL, METERED RESPIRATORY (INHALATION) EVERY 6 HOURS
Qty: 8 G | Refills: 1 | Status: SHIPPED | OUTPATIENT
Start: 2023-11-07

## 2023-11-07 ASSESSMENT — PATIENT HEALTH QUESTIONNAIRE - PHQ9
SUM OF ALL RESPONSES TO PHQ QUESTIONS 1-9: 2
SUM OF ALL RESPONSES TO PHQ QUESTIONS 1-9: 2
10. IF YOU CHECKED OFF ANY PROBLEMS, HOW DIFFICULT HAVE THESE PROBLEMS MADE IT FOR YOU TO DO YOUR WORK, TAKE CARE OF THINGS AT HOME, OR GET ALONG WITH OTHER PEOPLE: NOT DIFFICULT AT ALL

## 2023-11-07 ASSESSMENT — ENCOUNTER SYMPTOMS
NERVOUS/ANXIOUS: 0
PALPITATIONS: 0
HEADACHES: 0
FEVER: 0
DYSURIA: 0
MYALGIAS: 0
SORE THROAT: 0
ARTHRALGIAS: 0
WEAKNESS: 0
PARESTHESIAS: 0
DIARRHEA: 0
NAUSEA: 0
CONSTIPATION: 0
HEARTBURN: 0
EYE PAIN: 0
CHILLS: 0
FREQUENCY: 1
ABDOMINAL PAIN: 0
HEMATOCHEZIA: 0
JOINT SWELLING: 0
SHORTNESS OF BREATH: 1
HEMATURIA: 0
DIZZINESS: 1
COUGH: 0

## 2023-11-07 ASSESSMENT — PAIN SCALES - GENERAL: PAINLEVEL: NO PAIN (1)

## 2023-11-07 ASSESSMENT — ACTIVITIES OF DAILY LIVING (ADL): CURRENT_FUNCTION: NO ASSISTANCE NEEDED

## 2023-11-07 NOTE — PROGRESS NOTES
"SUBJECTIVE:   Travon is a 68 year old who presents for Preventive Visit.  History htn, bph, depression, high cholesterol, morbid obesity, ed and gerd.  Memory ok and  No  Falls. Outside blood pressure readings ok. No chest pain or shortness of breath. Good overall exercise tolerance.   No nausea, vomiting or diarrhea or black/bloody stools. No urine changes or hematuria.   Emotionally doing good. Sleep overall ok. Seen urology for urethra.   Gerd stable  Every other day prilosec - stable. Taking  MVI/D. No dysphagia.   Albuterol prn was a little helpful. Cough overall improving. Sleep overall. No rashes/mole changes. Memory Ok. Eats meat.   Had shots already..   Hearing a little down.          11/7/2023     7:38 AM   Additional Questions   Roomed by Yamileth   Accompanied by n/a       Are you in the first 12 months of your Medicare coverage?  No    Healthy Habits:     In general, how would you rate your overall health?  Good    Frequency of exercise:  None    Do you usually eat at least 4 servings of fruit and vegetables a day, include whole grains    & fiber and avoid regularly eating high fat or \"junk\" foods?  No    Taking medications regularly:  Yes    Ability to successfully perform activities of daily living:  No assistance needed    Home Safety:  Lack of grab bars in the bathroom    Hearing Impairment:  Difficulty following a conversation in a noisy restaurant or crowded room, feel that people are mumbling or not speaking clearly, difficulty following dialogue in the theater and need to ask people to speak up or repeat themselves    In the past 6 months, have you been bothered by leaking of urine? Yes    In general, how would you rate your overall mental or emotional health?  Good    Additional concerns today:  No      Today's PHQ-9 Score:       11/7/2023     7:32 AM   PHQ-9 SCORE   PHQ-9 Total Score MyChart 2 (Minimal depression)   PHQ-9 Total Score 2           Have you ever done Advance Care Planning? (For " example, a Health Directive, POLST, or a discussion with a medical provider or your loved ones about your wishes): No, advance care planning information given to patient to review.  Advanced care planning was discussed at today's visit.       Fall risk  Fallen 2 or more times in the past year?: No  Any fall with injury in the past year?: No  none  Normal cognition based on my direct observation during interview and exam.      Do you have sleep apnea, excessive snoring or daytime drowsiness? : no    Reviewed and updated as needed this visit by clinical staff   Tobacco  Allergies  Meds              Reviewed and updated as needed this visit by Provider                 Social History     Tobacco Use    Smoking status: Former     Types: Cigarettes     Quit date: 1970     Years since quittin.8    Smokeless tobacco: Never    Tobacco comments:     no 2nd hand smoke exposure   Substance Use Topics    Alcohol use: Yes     Comment: socially             10/31/2023    10:33 AM   Alcohol Use   Prescreen: >3 drinks/day or >7 drinks/week? No     Do you have a current opioid prescription? No  Do you use any other controlled substances or medications that are not prescribed by a provider? None          Pt mentions , a growth on his on face and back of neck. And mentions he is currently taking THC and CBD gummies. Got Covid and still having big deep cough.     Current providers sharing in care for this patient include:   Patient Care Team:  Clement De Jesus MD as PCP - General  Clement De Jesus MD as Assigned PCP  Christiano Leger MD as MD (Urology)  Clement De Jesus MD as Referring Physician (Family Practice)  Janet Dodd PA-C as Physician Assistant (Physician Assistant)  FRANKIE Linder MD as Assigned Surgical Provider    The following health maintenance items are reviewed in Epic and correct as of today:  Health Maintenance   Topic Date Due    ANNUAL REVIEW OF HM ORDERS  Never done    RSV  "VACCINE (Pregnancy & 60+) (1 - 1-dose 60+ series) Never done    AORTIC ANEURYSM SCREENING (SYSTEM ASSIGNED)  10/23/2018    LIPID  11/12/2022    MEDICARE ANNUAL WELLNESS VISIT  11/03/2023    PHQ-9  05/07/2024    FALL RISK ASSESSMENT  11/07/2024    ADVANCE CARE PLANNING  11/03/2027    DTAP/TDAP/TD IMMUNIZATION (3 - Td or Tdap) 06/04/2029    COLORECTAL CANCER SCREENING  02/04/2031    HEPATITIS C SCREENING  Completed    DEPRESSION ACTION PLAN  Completed    INFLUENZA VACCINE  Completed    Pneumococcal Vaccine: 65+ Years  Completed    ZOSTER IMMUNIZATION  Completed    COVID-19 Vaccine  Completed    IPV IMMUNIZATION  Aged Out    HPV IMMUNIZATION  Aged Out    MENINGITIS IMMUNIZATION  Aged Out    RSV MONOCLONAL ANTIBODY  Aged Out     Review of Systems   Constitutional:  Negative for chills and fever.   HENT:  Positive for hearing loss. Negative for congestion, ear pain and sore throat.    Eyes:  Negative for pain and visual disturbance.   Respiratory:  Positive for shortness of breath. Negative for cough.    Cardiovascular:  Negative for chest pain, palpitations and peripheral edema.   Gastrointestinal:  Negative for abdominal pain, constipation, diarrhea, heartburn, hematochezia and nausea.   Genitourinary:  Positive for frequency, impotence and urgency. Negative for dysuria, genital sores, hematuria and penile discharge.   Musculoskeletal:  Negative for arthralgias, joint swelling and myalgias.   Skin:  Negative for rash.   Neurological:  Positive for dizziness. Negative for weakness, headaches and paresthesias.   Psychiatric/Behavioral:  Negative for mood changes. The patient is not nervous/anxious.        OBJECTIVE:   /80   Pulse 76   Temp 97.1  F (36.2  C) (Tympanic)   Resp 14   Ht 1.778 m (5' 10\")   Wt 131.5 kg (290 lb)   SpO2 95%   BMI 41.61 kg/m   Estimated body mass index is 41.61 kg/m  as calculated from the following:    Height as of this encounter: 1.778 m (5' 10\").    Weight as of this encounter: " 131.5 kg (290 lb).  Physical Exam  GENERAL: healthy, alert and no distress  EYES: Eyes grossly normal to inspection, PERRL and conjunctivae and sclerae normal  HENT: ear canals and TM's normal, nose and mouth without ulcers or lesions  HENT: waxy canals lavaged by MA  NECK: no adenopathy, no asymmetry, masses, or scars and thyroid normal to palpation  RESP: lungs clear to auscultation - no rales, rhonchi or wheezes  BREAST: normal without masses, tenderness or nipple discharge and no palpable axillary masses or adenopathy  CV: regular rate and rhythm, normal S1 S2, no S3 or S4, no murmur, click or rub, no peripheral edema and peripheral pulses strong  ABDOMEN: soft, nontender, no hepatosplenomegaly, no masses and bowel sounds normal   (male): patient deferred gu/rectal  MS: no gross musculoskeletal defects noted, no edema  SKIN: no suspicious lesions or rashes  NEURO: Normal strength and tone, mentation intact and speech normal  PSYCH: mentation appears normal, affect normal/bright  LYMPH: no cervical, supraclavicular, axillary adenopathy    ASSESSMENT / PLAN:   ASSESSMENT / PLAN:  (Z00.00) Encounter for Medicare annual wellness exam  (primary encounter diagnosis)  Comment: generally healty and normal exam. No falls and memory ok  Plan: vitaminD/MVI. Reviewed self mole/testicle check handout.    Hearing down despite MA ear lavage. Will have patient see audiology.     (F33.1) Major depressive disorder, recurrent episode, moderate (H)  Comment: stable  Plan: OFFICE/OUTPT VISIT,EST,LEVL IV        Exercise and continue med. Recheck in 6 months  Sooner if worse. If SUICIAL IDEATION OR HOMOCIDAL IDEATION OR YAKOV TO ER     (E66.01) Morbid obesity (H)  Comment: needs help  Plan: OFFICE/OUTPT VISIT,EST,LEVL IV        Low carb/high protein diet. Exercise. Recheck in 6 months      (E78.5) HYPERLIPIDEMIA LDL GOAL <130  Comment: ok in past  Plan: Lipid Profile, Comprehensive metabolic panel,         OFFICE/OUTPT  "VISIT,EST,LEVL IV        Continue statin. Chest pain or shortness of breath to er    (I10) Hypertension goal BP (blood pressure) < 140/90  Comment: stabl  Plan: Comprehensive metabolic panel, OFFICE/OUTPT         VISIT,EST,LEVL IV        Self-monitor. Continue meds. Add cozaar if worse. Recheck in 6 months      (Z12.5) Screening PSA (prostate specific antigen)    Plan: Prostate Specific Antigen Screen           (K21.9) Gastroesophageal reflux disease without esophagitis  Comment: stable  Plan: omeprazole (PRILOSEC) 20 MG DR capsule,         OFFICE/OUTPT VISIT,EST,LEVL IV        Weight loss.     (R06.2) Wheezing  Comment: improving after covid.   Plan: albuterol (PROAIR HFA/PROVENTIL HFA/VENTOLIN         HFA) 108 (90 Base) MCG/ACT inhaler        Continue rare prn. Allergist if worsening? Acutely worsneing shortness of breath to er.       Patient has been advised of split billing requirements and indicates understanding: Yes      COUNSELING:  Reviewed preventive health counseling, as reflected in patient instructions       Regular exercise       Healthy diet/nutrition       Vision screening       Hearing screening       Dental care       Bladder control       Fall risk prevention       Colon cancer screening       Prostate cancer screening      BMI:   Estimated body mass index is 41.61 kg/m  as calculated from the following:    Height as of this encounter: 1.778 m (5' 10\").    Weight as of this encounter: 131.5 kg (290 lb).   Weight management plan: Discussed healthy diet and exercise guidelines      He reports that he quit smoking about 52 years ago. His smoking use included cigarettes. He has never used smokeless tobacco.      Appropriate preventive services were discussed with this patient, including applicable screening as appropriate for fall prevention, nutrition, physical activity, Tobacco-use cessation, weight loss and cognition.  Checklist reviewing preventive services available has been given to the " patient.    Reviewed patients plan of care and provided an AVS. The Intermediate Care Plan ( asthma action plan, low back pain action plan, and migraine action plan) for Tad meets the Care Plan requirement. This Care Plan has been established and reviewed with the Patient.          Clement De Jesus MD  [facility]    Identified Health Risks:  I have reviewed Opioid Use Disorder and Substance Use Disorder risk factors and made any needed referrals.   Answers submitted by the patient for this visit:  Patient Health Questionnaire (Submitted on 11/7/2023)  If you checked off any problems, how difficult have these problems made it for you to do your work, take care of things at home, or get along with other people?: Not difficult at all  PHQ9 TOTAL SCORE: 2

## 2023-11-07 NOTE — PATIENT INSTRUCTIONS
Patient Education   Personalized Prevention Plan  You are due for the preventive services outlined below.  Your care team is available to assist you in scheduling these services.  If you have already completed any of these items, please share that information with your care team to update in your medical record.  Health Maintenance Due   Topic Date Due     ANNUAL REVIEW OF HM ORDERS  Never done     RSV VACCINE (Pregnancy & 60+) (1 - 1-dose 60+ series) Never done     AORTIC ANEURYSM SCREENING (SYSTEM ASSIGNED)  10/23/2018     Cholesterol Lab  11/12/2022     Depression Assessment  05/03/2023     Annual Wellness Visit  11/03/2023

## 2023-11-22 ENCOUNTER — TELEPHONE (OUTPATIENT)
Dept: UROLOGY | Facility: CLINIC | Age: 68
End: 2023-11-22
Payer: COMMERCIAL

## 2023-11-22 ENCOUNTER — PRE VISIT (OUTPATIENT)
Dept: UROLOGY | Facility: CLINIC | Age: 68
End: 2023-11-22
Payer: COMMERCIAL

## 2023-11-22 NOTE — TELEPHONE ENCOUNTER
Reason for visit: Symptom check      Relevant information: ED and buried    Records/imaging/labs/orders: all records available    Pt called: message sent to reschedule to Dr. Olivo     At Rooming: jimi Haney CMA  11/22/2023  3:13 PM

## 2023-12-06 ENCOUNTER — PRE VISIT (OUTPATIENT)
Dept: UROLOGY | Facility: CLINIC | Age: 68
End: 2023-12-06
Payer: COMMERCIAL

## 2023-12-06 NOTE — TELEPHONE ENCOUNTER
Reason for visit: follow up      Dx/Hx/Sx: ED && buried penis     Records/imaging/labs/orders: in epic    At Rooming: virtual visit

## 2024-01-01 NOTE — PROGRESS NOTES
SUBJECTIVE:  Tad Olivas, a 62 year old male scheduled an appointment to discuss the following issues:  Blood pressure/ cholesterol, gerd and depression.  Seeing urology for urethral stricture  Has blood pressure cuff at home - ok.   Fasting today. No chest pain or shortness of breath. No nausea, vomiting or diarrhea or black or bloody stools. Colonoscopy last year - polyps x2.   No urine changes acutely. No hematuria.   gerd stable. No dysphagia. Taking MVI and balanced diet.   Emotionally doing ok.    Exercise needs more.     Past Medical History:   Diagnosis Date     Biceps rupture, distal      Degenerative joint disease     right knee OA     Depressive disorder      Elevated cholesterol      Erectile dysfunction 12/29/2010     Hypertension      Hypertension goal BP (blood pressure) < 140/90      Moderate major depression (H) 12/29/2010     Renal cysts, acquired, bilateral 9/26/2012       Past Surgical History:   Procedure Laterality Date     BIOPSY  2017    colon     COLONOSCOPY  2017     ORTHOPEDIC SURGERY      right knee replacement     REPAIR TENDON BICEPS DISTAL UPPER EXTREMITY      10/15/10     SOFT TISSUE SURGERY       SURGICAL HISTORY OF -       rt shoulder cartilage repair     SURGICAL HISTORY OF -       cartilage .ligament ,arthroscopy     URETHROPLASTY WITH BUCCAL GRAFT N/A 10/14/2016    Procedure: URETHROPLASTY WITH BUCCAL GRAFT;  Surgeon: Christiano Leger MD;  Location: [facility] OR     UROLOGY PROCEDURE                         DATE:  10.15.2016    urithral stricture        Family History   Problem Relation Age of Onset     Coronary Artery Disease Mother      Hyperlipidemia Mother      Diabetes Father      Cerebrovascular Disease Father      Breast Cancer Father      Anesthesia Reaction Father        Social History   Substance Use Topics     Smoking status: Former Smoker     Quit date: 12/30/1970     Smokeless tobacco: Never Used      Comment: no 2nd hand smoke exposure     Alcohol use Yes       "Comment: occ.       ROS:  All other ROS negative    OBJECTIVE:  /86  Pulse 73  Temp 97.2  F (36.2  C) (Oral)  Resp 20  Ht 5' 11\" (1.803 m)  Wt 276 lb (125.2 kg)  SpO2 96%  BMI 38.49 kg/m2  EXAM:  GENERAL APPEARANCE: healthy, alert and no distress  EYES: EOMI,  PERRL  HENT: ear canals and TM's normal and nose and mouth without ulcers or lesions  NECK: no adenopathy, no asymmetry, masses, or scars and thyroid normal to palpation  RESP: lungs clear to auscultation - no rales, rhonchi or wheezes  CV: regular rates and rhythm, normal S1 S2, no S3 or S4 and no murmur, click or rub -  ABDOMEN:  soft, nontender, no HSM or masses and bowel sounds normal  MS: extremities normal- no gross deformities noted, no evidence of inflammation in joints, FROM in all extremities.  PSYCH: mentation appears normal and affect normal/bright    ASSESSMENT / PLAN:  (F33.1) Major depressive disorder, recurrent episode, moderate (H)  (primary encounter diagnosis)  Comment: stable  Plan: PAF COMPLETED, FLUoxetine (PROZAC) 40 MG         capsule        Exercise. If SUICIAL IDEATION OR HOMOCIDAL IDEATION OR YAKOV TO ER. Recheck in 6 months  Sooner if worse. Call/email with questions/concerns    (I10) Hypertension goal BP (blood pressure) < 140/90  Comment: stable  Plan: PAF COMPLETED, Comprehensive metabolic panel         (BMP + Alb, Alk Phos, ALT, AST, Total. Bili,         TP), amLODIPine (NORVASC) 5 MG tablet        Self-montior. 5 lbs weight loss. Exercise. Chest pain or shortness of breath to er. Call/email with questions/concerns     (K21.9) Gastroesophageal reflux disease without esophagitis  Comment: stable  Plan: PAF COMPLETED, CBC with platelets        Continue prilosec and egd if worse. Continue mdi.     (E78.5) Hyperlipidemia LDL goal <130  Comment: stable in past  Plan: PAF COMPLETED, Comprehensive metabolic panel         (BMP + Alb, Alk Phos, ALT, AST, Total. Bili,         TP), Lipid panel reflex to direct LDL Fasting      "   Weight loss.     (Z12.5) Special screening for malignant neoplasm of prostate    Plan: PSA, screen          Patient mentioned heel pain - likely plantar fasciitis. Can see podiatry if stretching not helpful. Referral placed.   Clement De Jesus     chart(s)

## 2024-01-05 ENCOUNTER — VIRTUAL VISIT (OUTPATIENT)
Dept: UROLOGY | Facility: CLINIC | Age: 69
End: 2024-01-05
Payer: COMMERCIAL

## 2024-01-05 DIAGNOSIS — N52.33 ERECTILE DYSFUNCTION FOLLOWING URETHRAL SURGERY: ICD-10-CM

## 2024-01-05 DIAGNOSIS — R39.198 SLOW URINARY STREAM: ICD-10-CM

## 2024-01-05 PROCEDURE — 99213 OFFICE O/P EST LOW 20 MIN: CPT | Mod: 95 | Performed by: UROLOGY

## 2024-01-05 RX ORDER — TADALAFIL 5 MG/1
5 TABLET ORAL DAILY
Qty: 90 TABLET | Refills: 3 | Status: SHIPPED | OUTPATIENT
Start: 2024-01-05

## 2024-01-05 RX ORDER — FINASTERIDE 5 MG/1
1 TABLET, FILM COATED ORAL DAILY
Qty: 90 TABLET | Refills: 3 | Status: SHIPPED | OUTPATIENT
Start: 2024-01-05

## 2024-01-05 RX ORDER — TAMSULOSIN HYDROCHLORIDE 0.4 MG/1
0.4 CAPSULE ORAL DAILY
Qty: 90 CAPSULE | Refills: 3 | Status: SHIPPED | OUTPATIENT
Start: 2024-01-05

## 2024-01-05 ASSESSMENT — PAIN SCALES - GENERAL: PAINLEVEL: NO PAIN (0)

## 2024-01-05 NOTE — PROGRESS NOTES
Virtual Visit Details    Type of service:  Video Visit   Video Start Time: 1230p  Video End Time:1245p    Originating Location (pt. Location): Home    Distant Location (provider location):  Off-site  Platform used for Video Visit: Sarahi Olivas is a 67 year old male with      2016: 7cm dorsal BMG urethroplasty by Arsen  2017: slow flow with normal cysto --> treated for presumed BPH with finasteride and Flomax  2019 : LUTS --> UDS showing weak detrusor and slow flow. Didn't see me afterwards but was managed for presumed BPH  2022: still has slow flow. Only gets good flow with very full bladder. Worse flow when he forgets his Flomax. Unclear why he would have the weak detrusor (no DM or other neurologic conditions). Complains of buried penis --> makes it hard to direct stream.   He had a cystoscopy in December 2022 which shows some bands but scope passes into bladder.  He also has ED and tried Viagra but cost prohibitive in the past    Uses Flomax, Finasteride and Cialis 5mg    Exam:           Constitutional - No apparent distress. Patient of stated age.               Eyes - no redness or discharge.              Respiratory - no cough. no labored breathing              Musculoskeletal - full range of motion in all extremities              Skin - no visible skin discoloration or lesions              Neurological - no tremors              Psychiatric - no anxiety, alert & oriented                 A/P:  BPH,  Urethral stricture now repaired.  ED on cialis.    Rx refills for cialis, finasteride and flomax given    Would consider as Dr. Leger said before:   Discussed Interstim or CIC for weak detrusor vs. TURP for BPH. He says slow flow is not bad enough that he would consider any of these therapies. If he changes his mind in the future then we may want to consider repeat UDS before Interstim of TURP.    Thus, urodynamics if he wants to pursue surgery.  Can see NELI for followup in 9-12 months.    José Miguel  MD [facility]

## 2024-01-05 NOTE — NURSING NOTE
Is the patient currently in the state of [place]? YES    Visit mode:VIDEO    If the visit is dropped, the patient can be reconnected by: VIDEO VISIT: Text to cell phone:   Telephone Information:   Mobile [phone]       Will anyone else be joining the visit? NO  (If patient encounters technical issues they should call [phone])    How would you like to obtain your AVS? MyChart    Are changes needed to the allergy or medication list? No    Reason for visit: RECHECK (1 year follow-up )    Marilin GORMAN

## 2024-01-05 NOTE — LETTER
1/5/2024       RE: Tad Olivas  [address]   [place] [address]     Dear Colleague,    Thank you for referring your patient, Tad Olivas, to the [facility] [place] at [facility]. Please see a copy of my visit note below.    Virtual Visit Details    Type of service:  Video Visit   Video Start Time: 1230p  Video End Time:1245p    Originating Location (pt. Location): Home    Distant Location (provider location):  Off-site  Platform used for Video Visit: Sarahi Olivas is a 67 year old male with      2016: 7cm dorsal BMG urethroplasty by Arsen  2017: slow flow with normal cysto --> treated for presumed BPH with finasteride and Flomax  2019 : LUTS --> UDS showing weak detrusor and slow flow. Didn't see me afterwards but was managed for presumed BPH  2022: still has slow flow. Only gets good flow with very full bladder. Worse flow when he forgets his Flomax. Unclear why he would have the weak detrusor (no DM or other neurologic conditions). Complains of buried penis --> makes it hard to direct stream.   He had a cystoscopy in December 2022 which shows some bands but scope passes into bladder.  He also has ED and tried Viagra but cost prohibitive in the past    Uses Flomax, Finasteride and Cialis 5mg    Exam:           Constitutional - No apparent distress. Patient of stated age.               Eyes - no redness or discharge.              Respiratory - no cough. no labored breathing              Musculoskeletal - full range of motion in all extremities              Skin - no visible skin discoloration or lesions              Neurological - no tremors              Psychiatric - no anxiety, alert & oriented                 A/P:  BPH,  Urethral stricture now repaired.  ED on cialis.    Rx refills for cialis, finasteride and flomax given    Would consider as Dr. Leger said before:   Discussed Interstim or CIC for weak  detrusor vs. TURP for BPH. He says slow flow is not bad enough that he would consider any of these therapies. If he changes his mind in the future then we may want to consider repeat UDS before Interstim of TURP.    Thus, urodynamics if he wants to pursue surgery.  Can see NELI for followup in 9-12 months.    José Miguel Olivo MD

## 2024-01-06 DIAGNOSIS — F33.1 MAJOR DEPRESSIVE DISORDER, RECURRENT EPISODE, MODERATE (H): ICD-10-CM

## 2024-01-08 RX ORDER — BUPROPION HYDROCHLORIDE 100 MG/1
TABLET, EXTENDED RELEASE ORAL
Qty: 90 TABLET | Refills: 0 | Status: SHIPPED | OUTPATIENT
Start: 2024-01-08 | End: 2024-05-07

## 2024-01-09 ENCOUNTER — TELEPHONE (OUTPATIENT)
Dept: UROLOGY | Facility: CLINIC | Age: 69
End: 2024-01-09
Payer: COMMERCIAL

## 2024-01-09 NOTE — TELEPHONE ENCOUNTER
Left Voicemail (1st Attempt) for the patient to call back and schedule the following:    Appointment type: Return  Provider: Janet Dodd  Return date: January 2025  Specialty phone number: [phone]  Additional appointment(s) needed: N/A  Additonal Notes: Yearly VV follow up with Janet Dodd

## 2024-01-15 DIAGNOSIS — E78.5 HYPERLIPIDEMIA LDL GOAL <130: ICD-10-CM

## 2024-01-15 DIAGNOSIS — F33.1 MAJOR DEPRESSIVE DISORDER, RECURRENT EPISODE, MODERATE (H): ICD-10-CM

## 2024-01-15 RX ORDER — ATORVASTATIN CALCIUM 80 MG/1
TABLET, FILM COATED ORAL
Qty: 90 TABLET | Refills: 2 | Status: SHIPPED | OUTPATIENT
Start: 2024-01-15 | End: 2024-05-07

## 2024-01-16 ENCOUNTER — OFFICE VISIT (OUTPATIENT)
Dept: AUDIOLOGY | Facility: CLINIC | Age: 69
End: 2024-01-16
Payer: COMMERCIAL

## 2024-01-16 DIAGNOSIS — H90.3 SENSORINEURAL HEARING LOSS, BILATERAL: Primary | ICD-10-CM

## 2024-01-16 DIAGNOSIS — H91.93 HEARING DIFFICULTY OF BOTH EARS: ICD-10-CM

## 2024-01-16 PROCEDURE — 92557 COMPREHENSIVE HEARING TEST: CPT

## 2024-01-16 PROCEDURE — 92550 TYMPANOMETRY & REFLEX THRESH: CPT

## 2024-01-16 NOTE — PROGRESS NOTES
AUDIOLOGY REPORT    SUBJECTIVE:  Tad Olivas is a 68 year old male who was seen in the Audiology Clinic at the [facility] for audiologic evaluation, referred by Clement De Jesus M.D. The patient reports a gradual decline in hearing and would like a baseline audiogram. The patient denies dizziness, bilateral tinnitus, bilateral otalgia, bilateral drainage, bilateral aural fullness, history of ear surgeries, and family history of hearing loss. 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 The patient notes difficulty with communication in a variety of listening situations.     OBJECTIVE:  Abuse Screening:  Do you feel unsafe at home or work/school? No  Do you feel threatened by someone? No  Does anyone try to keep you from having contact with others, or doing things outside of your home? No  Physical signs of abuse present? No     Fall Risk Screen:  1. Have you fallen two or more times in the past year? No  2. Have you fallen and had an injury in the past year? No    Otoscopic exam indicates ears are clear of cerumen bilaterally     Pure Tone Thresholds assessed using conventional audiometry with good  reliability from 250-8000 Hz bilaterally using insert earphones and circumaural headphones     RIGHT:  normal sloping to moderate-severe sensorineural hearing loss    LEFT:    normal sloping to moderate-severe sensorineural hearing loss    Tympanogram:    RIGHT: normal eardrum mobility    LEFT:   hypermobile    Reflexes (reported by stimulus ear):  RIGHT: Ipsilateral is present at normal levels  RIGHT: Contralateral- could not maintain seal  LEFT:   Ipsilateral- could not maintain seal  LEFT:   Contralateral is present at normal levels      Speech Reception Threshold:    RIGHT: 25 dB HL    LEFT:   30 dB HL  Word Recognition Score:     RIGHT: 100% at 65 dB HL using NU-6 recorded word list.    LEFT:   100% at 70 dB HL using NU-6 recorded word  list.      ASSESSMENT:   Bilateral sensorineural hearing loss. Today s results were discussed with the patient in detail.     PLAN:  Patient was counseled regarding hearing loss and impact on communication. Patient is a good candidate for amplification at this time.  Handout on good communication strategies, and hearing aid use was given to patient. It is recommended that the patient return for a hearing aid consultation and/or every 2-3 years for an updated audiogram- sooner with changes/concerns.  Please call this clinic with questions regarding these results or recommendations.      Braxton Chinchilla, [facility]  Licensed Audiologist  [place] #[id number]      01/16/24

## 2024-02-06 ENCOUNTER — TRANSFERRED RECORDS (OUTPATIENT)
Dept: HEALTH INFORMATION MANAGEMENT | Facility: CLINIC | Age: 69
End: 2024-02-06
Payer: COMMERCIAL

## 2024-02-26 ENCOUNTER — MYC MEDICAL ADVICE (OUTPATIENT)
Dept: FAMILY MEDICINE | Facility: CLINIC | Age: 69
End: 2024-02-26
Payer: COMMERCIAL

## 2024-02-27 ENCOUNTER — OFFICE VISIT (OUTPATIENT)
Dept: URGENT CARE | Facility: URGENT CARE | Age: 69
End: 2024-02-27
Payer: COMMERCIAL

## 2024-02-27 ENCOUNTER — ANCILLARY PROCEDURE (OUTPATIENT)
Dept: GENERAL RADIOLOGY | Facility: CLINIC | Age: 69
End: 2024-02-27
Attending: NURSE PRACTITIONER
Payer: COMMERCIAL

## 2024-02-27 VITALS
SYSTOLIC BLOOD PRESSURE: 131 MMHG | TEMPERATURE: 97.9 F | OXYGEN SATURATION: 93 % | DIASTOLIC BLOOD PRESSURE: 86 MMHG | HEART RATE: 82 BPM | RESPIRATION RATE: 20 BRPM

## 2024-02-27 DIAGNOSIS — S59.901A INJURY OF RIGHT ELBOW, INITIAL ENCOUNTER: ICD-10-CM

## 2024-02-27 DIAGNOSIS — M77.9: ICD-10-CM

## 2024-02-27 DIAGNOSIS — S69.91XA WRIST INJURY, RIGHT, INITIAL ENCOUNTER: ICD-10-CM

## 2024-02-27 DIAGNOSIS — S59.901A INJURY OF RIGHT ELBOW, INITIAL ENCOUNTER: Primary | ICD-10-CM

## 2024-02-27 DIAGNOSIS — M11.20 CHONDROCALCINOSIS: ICD-10-CM

## 2024-02-27 PROCEDURE — 73080 X-RAY EXAM OF ELBOW: CPT | Mod: TC | Performed by: RADIOLOGY

## 2024-02-27 PROCEDURE — 99213 OFFICE O/P EST LOW 20 MIN: CPT | Performed by: NURSE PRACTITIONER

## 2024-02-27 PROCEDURE — 73110 X-RAY EXAM OF WRIST: CPT | Mod: TC | Performed by: RADIOLOGY
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
## 2024-02-29 ENCOUNTER — ANCILLARY PROCEDURE (OUTPATIENT)
Dept: GENERAL RADIOLOGY | Facility: CLINIC | Age: 69
End: 2024-02-29
Attending: FAMILY MEDICINE
Payer: COMMERCIAL

## 2024-02-29 ENCOUNTER — OFFICE VISIT (OUTPATIENT)
Dept: ORTHOPEDICS | Facility: CLINIC | Age: 69
End: 2024-02-29
Attending: NURSE PRACTITIONER
Payer: COMMERCIAL

## 2024-02-29 VITALS
SYSTOLIC BLOOD PRESSURE: 132 MMHG | DIASTOLIC BLOOD PRESSURE: 79 MMHG | WEIGHT: 290 LBS | HEART RATE: 88 BPM | BODY MASS INDEX: 41.61 KG/M2

## 2024-02-29 DIAGNOSIS — M77.9: ICD-10-CM

## 2024-02-29 DIAGNOSIS — G89.29 CHRONIC BILATERAL LOW BACK PAIN WITHOUT SCIATICA: Primary | ICD-10-CM

## 2024-02-29 DIAGNOSIS — S69.91XA WRIST INJURY, RIGHT, INITIAL ENCOUNTER: ICD-10-CM

## 2024-02-29 DIAGNOSIS — M54.50 CHRONIC BILATERAL LOW BACK PAIN WITHOUT SCIATICA: Primary | ICD-10-CM

## 2024-02-29 DIAGNOSIS — M54.9 BACK PAIN: ICD-10-CM

## 2024-02-29 DIAGNOSIS — S59.901A INJURY OF RIGHT ELBOW, INITIAL ENCOUNTER: ICD-10-CM

## 2024-02-29 PROCEDURE — 72070 X-RAY EXAM THORAC SPINE 2VWS: CPT | Mod: TC | Performed by: RADIOLOGY

## 2024-02-29 PROCEDURE — 72100 X-RAY EXAM L-S SPINE 2/3 VWS: CPT | Mod: TC | Performed by: RADIOLOGY

## 2024-02-29 PROCEDURE — 99214 OFFICE O/P EST MOD 30 MIN: CPT | Performed by: FAMILY MEDICINE

## 2024-02-29 RX ORDER — CYCLOBENZAPRINE HCL 10 MG
10 TABLET ORAL
Qty: 30 TABLET | Refills: 0 | Status: SHIPPED | OUTPATIENT
Start: 2024-02-29
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
## 2024-03-04 DIAGNOSIS — I10 HYPERTENSION GOAL BP (BLOOD PRESSURE) < 140/90: ICD-10-CM

## 2024-03-04 RX ORDER — AMLODIPINE BESYLATE 5 MG/1
TABLET ORAL
Qty: 90 TABLET | Refills: 1 | Status: SHIPPED | OUTPATIENT
Start: 2024-03-04 | End: 2024-05-07

## 2024-03-10 ENCOUNTER — HOSPITAL ENCOUNTER (OUTPATIENT)
Dept: MRI IMAGING | Facility: CLINIC | Age: 69
Discharge: HOME | End: 2024-03-10
Attending: FAMILY MEDICINE | Admitting: FAMILY MEDICINE
Payer: COMMERCIAL

## 2024-03-10 DIAGNOSIS — S59.901A INJURY OF RIGHT ELBOW, INITIAL ENCOUNTER: ICD-10-CM

## 2024-03-10 PROCEDURE — 73221 MRI JOINT UPR EXTREM W/O DYE: CPT | Mod: RT

## 2024-03-10 PROCEDURE — 73221 MRI JOINT UPR EXTREM W/O DYE: CPT | Mod: 26 | Performed by: RADIOLOGY

## 2024-03-11 ENCOUNTER — TELEPHONE (OUTPATIENT)
Dept: ORTHOPEDICS | Facility: CLINIC | Age: 69
End: 2024-03-11
Payer: COMMERCIAL

## 2024-03-11 DIAGNOSIS — S46.211A RUPTURE OF RIGHT DISTAL BICEPS TENDON, INITIAL ENCOUNTER: Primary | ICD-10-CM

## 2024-03-11 NOTE — CONFIDENTIAL NOTE
Patient contacted via telephone regarding right elbow MRI results showing biceps tendon tear with retraction.  Given findings plan to treat with referral to hand surgeon given he has torn his left elbow.  Patient understands and agrees with plan.     Glenroy De La Fuente MD

## 2024-03-12 ENCOUNTER — VIRTUAL VISIT (OUTPATIENT)
Dept: ORTHOPEDICS | Facility: CLINIC | Age: 69
End: 2024-03-12
Payer: COMMERCIAL

## 2024-03-12 ENCOUNTER — TELEPHONE (OUTPATIENT)
Dept: ORTHOPEDICS | Facility: CLINIC | Age: 69
End: 2024-03-12

## 2024-03-12 DIAGNOSIS — S46.211A RUPTURE OF RIGHT DISTAL BICEPS TENDON, INITIAL ENCOUNTER: ICD-10-CM

## 2024-03-12 PROCEDURE — 99442 PR PHYSICIAN TELEPHONE EVALUATION 11-20 MIN: CPT | Performed by: STUDENT IN AN ORGANIZED HEALTH CARE EDUCATION/TRAINING PROGRAM

## 2024-03-12 NOTE — TELEPHONE ENCOUNTER
Patient is scheduled for surgery with Dr. Britt    Spoke with: Travon    Date of Surgery: 3/15/24    Location: [facility]    Informed patient they will need an adult  Yes    Pre op with Provider PAC, 3/13/24 at 8:15AM    Additional imaging/appointments: POP Made    Surgery packet: Mailed     Additional comments: N/A        Leidy Amanda on 3/12/2024 at 3:54 PM

## 2024-03-12 NOTE — TELEPHONE ENCOUNTER
FUTURE VISIT INFORMATION      SURGERY INFORMATION:  Date: 3/15/24  [place] [facility] [place]  Surgeon:  Jose Britt MD  Anesthesia Type:  MAC with Block  Procedure: REPAIR, TENDON, RIGHT BICEPS, DISTAL  Consult: 3/12/24    RECORDS REQUESTED FROM:       Primary Care Provider: [facility]     Pertinent Medical History: Hypertension; Hyperlipidemia     Most recent EKG+ Tracing: 10/4/16    Most recent ECHO: 6/12/19    Most recent Cardiac Stress Test: 6/12/19
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
## 2024-03-13 ENCOUNTER — VIRTUAL VISIT (OUTPATIENT)
Dept: SURGERY | Facility: CLINIC | Age: 69
End: 2024-03-13
Payer: COMMERCIAL

## 2024-03-13 ENCOUNTER — PRE VISIT (OUTPATIENT)
Dept: SURGERY | Facility: CLINIC | Age: 69
End: 2024-03-13

## 2024-03-13 ENCOUNTER — ANESTHESIA EVENT (OUTPATIENT)
Dept: SURGERY | Facility: AMBULATORY SURGERY CENTER | Age: 69
End: 2024-03-13
Payer: COMMERCIAL

## 2024-03-13 DIAGNOSIS — Z01.818 PRE-OP EVALUATION: Primary | ICD-10-CM

## 2024-03-13 DIAGNOSIS — S46.211A RUPTURE OF DISTAL BICEPS TENDON, RIGHT, INITIAL ENCOUNTER: ICD-10-CM

## 2024-03-13 PROCEDURE — 99203 OFFICE O/P NEW LOW 30 MIN: CPT | Mod: 95 | Performed by: PHYSICIAN ASSISTANT

## 2024-03-13 RX ORDER — OMEGA-3/DHA/EPA/FISH OIL 60 MG-90MG
CAPSULE ORAL EVERY MORNING
COMMUNITY

## 2024-03-13 ASSESSMENT — LIFESTYLE VARIABLES: TOBACCO_USE: 1

## 2024-03-13 NOTE — PROGRESS NOTES
Travon is a 68 year old who is being evaluated via a billable video visit.    Barry Lubin   Travon is a 68 year old, presenting for the following health issues:  Pre-Op Exam          FRANKY Garner LPN
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
## 2024-03-13 NOTE — PATIENT INSTRUCTIONS
Preparing for Your Surgery      Name:  Tad Olivas   MRN:  [id number]   :  1955   Today's Date:  3/13/2024         Arriving for surgery:  Surgery date:  3-15-24  Arrival time:  10:10 am    Restrictions due to COVID 19:    Please maintain social distance.  Masking is optional.      parking is available for anyone with mobility limitations or disabilities. (Monday- Friday 7 am- 5 pm)    Please come to:    [facility]  [address] [place] [phone]    Please check in on the 5th floor at the Ambulatory Surgery Center.      What can I eat or drink?    -  You may eat and drink normally until 8 hours prior to arrival  time. (Until 2:10 am)  -  You may have clear liquids until 2 hours prior to arrival  time. (Until 8:10 am)    Examples of clear liquids:  Water  Clear broth  Juices (apple, white grape, white cranberry  and cider) without pulp  Noncarbonated, powder based beverages  (lemonade and Pop-Aid)  Sodas (Sprite, 7-Up, ginger ale and seltzer)  Coffee or tea (without milk or cream)  Gatorade      Which medicines can I take?    Hold Aspirin for 7 days before surgery.   Hold Multivitamins for 7 days before surgery. Hold Multivitamin starting now if you have not already, and hold until surgery.  Hold Supplements for 7 days before surgery. Hold Fish Oil-Omega-3 Fatty Acids and Glucosamine-Chondroitin starting now if you have not already, and hold until surgery.  Hold Ibuprofen (Advil, Motrin) for 1 day before surgery--unless otherwise directed by surgeon.  Hold Naproxen (Aleve) for 4 days before surgery. Last Naproxen 3-12-24. Hold Naproxen starting now, and hold until surgery.  Acetaminophen (Tylenol) is okay to take if needed.    No alcohol or cannabis products for 24 hours prior to procedure.    Hold Tadalafil (Cialis) for 3 days prior to surgery.      -  DO NOT take the following medications the day of surgery:  Vitamin C (Ascorbic Acid)  Vitamin D3  (Cholecalciferol)      -  PLEASE TAKE the following medications the day of surgery:   Amlodipine (Norvasc)  Atorvastatin (Lipitor)  Bupropion (Wellbutrin SR)  Finasteride (Proscar)  Fluoxetine (Prozac)  Tamsulosin (Flomax)  Omeprazole (Prilosec)- if surgery is on the day you would usually take it  Albuterol inhaler if needed  Acetaminophen (Tylenol) if needed    Bring Albuterol inhaler.    How do I prepare myself?  - Please take 2 showers (one the night prior to surgery and one the morning of surgery) using Scrubcare or Hibiclens soap.    Use this soap only from the neck to your toes.     Leave the soap on your skin for one minute--then rinse thoroughly.      You may use your own shampoo and conditioner. No other hair products.   - Please remove all jewelry and body piercings.  - No lotions, deodorants or fragrance.  - No makeup or fingernail polish.   - Bring your ID and insurance card.    -If you have a Deep Brain Stimulator, a Spinal Cord Stimulator, or any implanted Neuro Device, you must bring the remote to the Surgery Center.         ALL PATIENTS ARE REQUIRED TO HAVE A RESPONSIBLE ADULT TO DRIVE AND BE IN ATTENDANCE WITH THEM FOR 24 HOURS FOLLOWING SURGERY.     Covid testing policy as of 12/06/2022  Your surgeon will notify and schedule you for a COVID test if one is needed before surgery--please direct any questions or COVID symptoms to your surgeon      Questions or Concerns:    -For questions regarding the day of surgery, please contact the Ambulatory Surgery Center at [phone].    -If you have health changes between today and your surgery, please contact your surgeon.     - For questions after surgery, please contact your surgeon's office.

## 2024-03-15 ENCOUNTER — ANCILLARY ORDERS (OUTPATIENT)
Dept: ORTHOPEDICS | Facility: CLINIC | Age: 69
End: 2024-03-15

## 2024-03-15 ENCOUNTER — ANESTHESIA (OUTPATIENT)
Dept: SURGERY | Facility: AMBULATORY SURGERY CENTER | Age: 69
End: 2024-03-15
Payer: COMMERCIAL

## 2024-03-15 ENCOUNTER — ANCILLARY PROCEDURE (OUTPATIENT)
Dept: RADIOLOGY | Facility: AMBULATORY SURGERY CENTER | Age: 69
End: 2024-03-15
Attending: STUDENT IN AN ORGANIZED HEALTH CARE EDUCATION/TRAINING PROGRAM
Payer: COMMERCIAL

## 2024-03-15 ENCOUNTER — HOSPITAL ENCOUNTER (OUTPATIENT)
Facility: AMBULATORY SURGERY CENTER | Age: 69
Discharge: HOME | End: 2024-03-15
Attending: STUDENT IN AN ORGANIZED HEALTH CARE EDUCATION/TRAINING PROGRAM | Admitting: STUDENT IN AN ORGANIZED HEALTH CARE EDUCATION/TRAINING PROGRAM
Payer: COMMERCIAL

## 2024-03-15 VITALS
WEIGHT: 275 LBS | DIASTOLIC BLOOD PRESSURE: 93 MMHG | HEART RATE: 76 BPM | RESPIRATION RATE: 16 BRPM | TEMPERATURE: 97 F | SYSTOLIC BLOOD PRESSURE: 151 MMHG | OXYGEN SATURATION: 95 % | HEIGHT: 70 IN | BODY MASS INDEX: 39.37 KG/M2

## 2024-03-15 DIAGNOSIS — S46.211A RUPTURE OF RIGHT DISTAL BICEPS TENDON, INITIAL ENCOUNTER: Primary | ICD-10-CM

## 2024-03-15 DIAGNOSIS — S46.219A: ICD-10-CM

## 2024-03-15 DIAGNOSIS — S46.219A: Primary | ICD-10-CM

## 2024-03-15 PROCEDURE — 24341 RPR TDN/MUSC UPR A/E EACH: CPT | Performed by: STUDENT IN AN ORGANIZED HEALTH CARE EDUCATION/TRAINING PROGRAM

## 2024-03-15 PROCEDURE — 24341 RPR TDN/MUSC UPR A/E EACH: CPT | Performed by: NURSE ANESTHETIST, CERTIFIED REGISTERED

## 2024-03-15 PROCEDURE — 24342 REPAIR OF RUPTURED TENDON: CPT | Mod: RT

## 2024-03-15 DEVICE — IMPL DELIVERY SYS,DISTAL BICEPS, BC
Type: IMPLANTABLE DEVICE | Site: ELBOW | Status: FUNCTIONAL
Brand: ARTHREX®

## 2024-03-15 RX ORDER — ACETAMINOPHEN 500 MG
500-1000 TABLET ORAL EVERY 8 HOURS PRN
Qty: 40 TABLET | Refills: 0 | Status: SHIPPED | OUTPATIENT
Start: 2024-03-15

## 2024-03-15 RX ORDER — CEFAZOLIN SODIUM IN 0.9 % NACL 3 G/100 ML
3 INTRAVENOUS SOLUTION, PIGGYBACK (ML) INTRAVENOUS SEE ADMIN INSTRUCTIONS
Status: DISCONTINUED | OUTPATIENT
Start: 2024-03-15 | End: 2024-03-19 | Stop reason: HOSPADM

## 2024-03-15 RX ORDER — SODIUM CHLORIDE, SODIUM LACTATE, POTASSIUM CHLORIDE, CALCIUM CHLORIDE 600; 310; 30; 20 MG/100ML; MG/100ML; MG/100ML; MG/100ML
INJECTION, SOLUTION INTRAVENOUS CONTINUOUS
Status: DISCONTINUED | OUTPATIENT
Start: 2024-03-15 | End: 2024-03-19 | Stop reason: HOSPADM

## 2024-03-15 RX ORDER — FLUMAZENIL 0.1 MG/ML
0.2 INJECTION, SOLUTION INTRAVENOUS
Status: DISCONTINUED | OUTPATIENT
Start: 2024-03-15 | End: 2024-03-19 | Stop reason: HOSPADM

## 2024-03-15 RX ORDER — CEFAZOLIN SODIUM IN 0.9 % NACL 3 G/100 ML
3 INTRAVENOUS SOLUTION, PIGGYBACK (ML) INTRAVENOUS
Status: COMPLETED | OUTPATIENT
Start: 2024-03-15 | End: 2024-03-15

## 2024-03-15 RX ORDER — NALOXONE HYDROCHLORIDE 0.4 MG/ML
0.2 INJECTION, SOLUTION INTRAMUSCULAR; INTRAVENOUS; SUBCUTANEOUS
Status: DISCONTINUED | OUTPATIENT
Start: 2024-03-15 | End: 2024-03-19 | Stop reason: HOSPADM

## 2024-03-15 RX ORDER — PROPOFOL 10 MG/ML
INJECTION, EMULSION INTRAVENOUS CONTINUOUS PRN
Status: DISCONTINUED | OUTPATIENT
Start: 2024-03-15 | End: 2024-03-15

## 2024-03-15 RX ORDER — NALOXONE HYDROCHLORIDE 0.4 MG/ML
0.4 INJECTION, SOLUTION INTRAMUSCULAR; INTRAVENOUS; SUBCUTANEOUS
Status: DISCONTINUED | OUTPATIENT
Start: 2024-03-15 | End: 2024-03-19 | Stop reason: HOSPADM

## 2024-03-15 RX ORDER — BUPIVACAINE HYDROCHLORIDE 5 MG/ML
INJECTION, SOLUTION EPIDURAL; INTRACAUDAL
Status: COMPLETED | OUTPATIENT
Start: 2024-03-15 | End: 2024-03-15

## 2024-03-15 RX ORDER — ACETAMINOPHEN 325 MG/1
975 TABLET ORAL ONCE
Status: COMPLETED | OUTPATIENT
Start: 2024-03-15 | End: 2024-03-15

## 2024-03-15 RX ORDER — FENTANYL CITRATE 50 UG/ML
25-50 INJECTION, SOLUTION INTRAMUSCULAR; INTRAVENOUS
Status: DISCONTINUED | OUTPATIENT
Start: 2024-03-15 | End: 2024-03-19 | Stop reason: HOSPADM

## 2024-03-15 RX ORDER — OXYCODONE HYDROCHLORIDE 5 MG/1
5-10 TABLET ORAL EVERY 6 HOURS PRN
Qty: 10 TABLET | Refills: 0 | Status: SHIPPED | OUTPATIENT
Start: 2024-03-15 | End: 2024-03-19

## 2024-03-15 RX ADMIN — FENTANYL CITRATE 25 MCG: 50 INJECTION, SOLUTION INTRAMUSCULAR; INTRAVENOUS at 14:25

## 2024-03-15 RX ADMIN — BUPIVACAINE HYDROCHLORIDE 30 ML: 5 INJECTION, SOLUTION EPIDURAL; INTRACAUDAL at 11:55

## 2024-03-15 RX ADMIN — Medication 3 G: at 13:04

## 2024-03-15 RX ADMIN — SODIUM CHLORIDE, SODIUM LACTATE, POTASSIUM CHLORIDE, CALCIUM CHLORIDE: 600; 310; 30; 20 INJECTION, SOLUTION INTRAVENOUS at 12:55

## 2024-03-15 RX ADMIN — Medication 50 MCG: at 13:29

## 2024-03-15 RX ADMIN — ACETAMINOPHEN 975 MG: 325 TABLET ORAL at 10:28

## 2024-03-15 RX ADMIN — Medication 50 MCG: at 13:20

## 2024-03-15 RX ADMIN — SODIUM CHLORIDE, SODIUM LACTATE, POTASSIUM CHLORIDE, CALCIUM CHLORIDE: 600; 310; 30; 20 INJECTION, SOLUTION INTRAVENOUS at 10:29

## 2024-03-15 RX ADMIN — Medication 0.2 MCG/KG/MIN: at 13:38

## 2024-03-15 RX ADMIN — PROPOFOL 75 MCG/KG/MIN: 10 INJECTION, EMULSION INTRAVENOUS at 13:01

## 2024-03-15 RX ADMIN — FENTANYL CITRATE 50 MCG: 50 INJECTION, SOLUTION INTRAMUSCULAR; INTRAVENOUS at 11:48

## 2024-03-15 ASSESSMENT — LIFESTYLE VARIABLES: TOBACCO_USE: 1

## 2024-03-15 NOTE — ANESTHESIA PREPROCEDURE EVALUATION
Anesthesia Pre-Procedure Evaluation    Patient: Tad Olivas   MRN: [id number] : 1955        Procedure : Procedure(s):  REPAIR, TENDON, RIGHT BICEPS, DISTAL          Past Medical History:   Diagnosis Date    Biceps rupture, distal     Degenerative joint disease     right knee OA    Elevated cholesterol     Erectile dysfunction 2010    Gastroesophageal reflux disease     Hypertension     Moderate major depression (H) 2010    Renal cysts, acquired, bilateral 2012      Past Surgical History:   Procedure Laterality Date    BIOPSY      colon    COLONOSCOPY  2017    EYE SURGERY Bilateral     Cataract    ORTHOPEDIC SURGERY      right knee replacement    RELEASE CARPAL TUNNEL Right 2022    Procedure: RELEASE, CARPAL TUNNEL, right;  Surgeon: FRANKIE Linder MD;  Location: [facility] OR    REPAIR TENDON BICEPS DISTAL UPPER EXTREMITY Left     10/15/10    SOFT TISSUE SURGERY      SURGICAL HISTORY OF -       rt shoulder cartilage repair    SURGICAL HISTORY OF -       cartilage .ligament ,arthroscopy    URETHROPLASTY WITH BUCCAL GRAFT N/A 10/14/2016    Procedure: URETHROPLASTY WITH BUCCAL GRAFT;  Surgeon: Christiano Leger MD;  Location: [facility] OR    UROLOGY PROCEDURE                         DATE:  10/15/2016    urithral stricture       No Known Allergies   Social History     Tobacco Use    Smoking status: Former     Types: Cigarettes     Quit date: 1970     Years since quittin.2     Passive exposure: Past    Smokeless tobacco: Never    Tobacco comments:     no 2nd hand smoke exposure   Substance Use Topics    Alcohol use: Yes     Comment: 2-4 drinks weekends      Wt Readings from Last 1 Encounters:   24 131.5 kg (290 lb)        Anesthesia Evaluation   Pt has had prior anesthetic.     No history of anesthetic complications       ROS/MED HX  ENT/Pulmonary: Comment: Has long COVID symptoms (cough) since , uses PRN albuterol rarely    (+)     JOANA risk factors,   hypertension, obese,        tobacco use, Past use,                    (-) asthma and recent URI   Neurologic:  - neg neurologic ROS     Cardiovascular:     (+) Dyslipidemia hypertension- -   -  - -                                 Previous cardiac testing   Echo: Date: Results:    Stress Test:  Date: 2019 Results:  Interpretation Summary  Suboptimal stress test due to inadequate maximum heart rate. Normal resting LV  function with EF of approximately 60-65%; normal response to exercise with  increase to approximately 70-75%. No stress induced regional wall motion  abnormalities. No ECG evidence of ischemia. No significant valvular disease  noted on routine screening color flow Doppler and pulsed Doppler examination.  Normal functional capacity. No resting wallmotion abnormalities.  Exercise was stopped due to leg fatigue.  The patient did not exhibit any symptoms during exercise.  Normal blood pressure response with stress.    ECG Reviewed:  Date: 2016 Results:  Sinus  Rhythm   WITHIN NORMAL LIMITS  Cath:  Date: Results:      METS/Exercise Tolerance: >4 METS Comment: Denies chest pain/pressure, [place], orthopnea, dizziness, palpitations, edema.    Hematologic:  - neg hematologic  ROS     Musculoskeletal: Comment: - Right biceps tendon rupture  - Chronic low back pain    (+)  arthritis,             GI/Hepatic:     (+) GERD, Asymptomatic on medication,               (-) liver disease   Renal/Genitourinary: Comment: ED    (+) renal disease (renal cysts),       BPH,      Endo:     (+)               Obesity,    (-) Type II DM and thyroid disease   Psychiatric/Substance Use:     (+) psychiatric history depression       Infectious Disease:  - neg infectious disease ROS     Malignancy:  - neg malignancy ROS     Other:          Physical Exam    Airway        Mallampati: II   TM distance: > 3 FB   Neck ROM: full   Mouth opening: > 3 cm    Respiratory Devices and Support         Dental       (+) Minor Abnormalities - some  "fillings, tiny chips      Cardiovascular   cardiovascular exam normal          Pulmonary   pulmonary exam normal                OUTSIDE LABS:  CBC:   Lab Results   Component Value Date    WBC 6.7 11/03/2022    WBC 6.3 09/22/2021    HGB 15.3 11/03/2022    HGB 13.6 09/22/2021    HCT 45.8 11/03/2022    HCT 41.0 09/22/2021     11/03/2022     09/22/2021     BMP:   Lab Results   Component Value Date     11/07/2023     11/03/2022    POTASSIUM 4.9 11/07/2023    POTASSIUM 4.5 11/03/2022    CHLORIDE 104 11/07/2023    CHLORIDE 110 (H) 11/03/2022    CO2 26 11/07/2023    CO2 30 11/03/2022    BUN 21.8 11/07/2023    BUN 24 11/03/2022    CR 1.25 (H) 11/07/2023    CR 1.06 11/03/2022     (H) 11/07/2023    GLC 85 11/03/2022     COAGS:   Lab Results   Component Value Date    PTT 31 09/22/2021    INR 1.01 09/22/2021     POC: No results found for: \"BGM\", \"HCG\", \"HCGS\"  HEPATIC:   Lab Results   Component Value Date    ALBUMIN 4.2 11/07/2023    PROTTOTAL 6.6 11/07/2023    ALT 25 11/07/2023    AST 21 11/07/2023    ALKPHOS 82 11/07/2023    BILITOTAL 1.3 (H) 11/07/2023     OTHER:   Lab Results   Component Value Date    RAMY 9.5 11/07/2023    PHOS 3.4 11/07/2023    TSH 2.60 12/29/2016       Anesthesia Plan    ASA Status:  3    NPO Status:  NPO Appropriate    Anesthesia Type: MAC.     - Reason for MAC: straight local not clinically adequate   Induction: Intravenous, Propofol.   Maintenance: TIVA.        Consents    Anesthesia Plan(s) and associated risks, benefits, and realistic alternatives discussed. Questions answered and patient/representative(s) expressed understanding.     - Discussed:     - Discussed with:  Patient      - Extended Intubation/Ventilatory Support Discussed: No.      - Patient is DNR/DNI Status: No     Use of blood products discussed: No .     Postoperative Care    Pain management: IV analgesics, Oral pain medications, Multi-modal analgesia, Peripheral nerve block (Single Shot).   PONV " prophylaxis: Dexamethasone or Solumedrol, Ondansetron (or other 5HT-3), Background Propofol Infusion     Comments:               Gayle Cruz MD    I have reviewed the pertinent notes and labs in the chart from the past 30 days and (re)examined the patient.  Any updates or changes from those notes are reflected in this note.

## 2024-03-15 NOTE — DISCHARGE INSTRUCTIONS
Procedure Performed: Right distal biceps repair  Attending Surgeon: Dr. Jose Britt  Date: 3/15/2024    DIAGNOSIS  1. Rupture of right distal biceps tendon, initial encounter        MEDICATIONS   Resume all home medications as directed unless otherwise instructed during this hospitalization. If there is any question, double check with your primary care provider.  Start new discharge medications as directed.    Take 1 tablet of 650 mg Tylenol (acetaminophen) Arthritis Strength (extended release) and 1 tablet of Aleve (naproxen) 220 mg in the morning with breakfast and in the evening with dinner.     For breakthrough pain use narcotic pain medication as prescribed.    Do not drive or operate machinery while taking narcotic pain medications.   If you are taking other Tylenol containing medicines at home, be sure NOT to exceed 4 gram's (4000 milligrams) of Tylenol per day.   If you are taking pain medications, be sure to take Colace (docusate sodium) as well to prevent constipation. If constipated, try adding another cathartic or enema.  If nausea and vomiting, call the hospital or seek medical attention.    ACTIVITY   Weight bearing: Non-weight bearing to arm.    DIET  Resume same diet prior to your hospital admission.    WOUND   Leave dressing on until you are seen in clinic for your follow up visit.   Watch for signs and symptoms of infection of your wounds including; pain, redness, swelling, drainage or fever.  If you notice any of these symptoms please call or seek medical attention.    Keep wound clean, dry, and intact.  Do not submerge wounds in water until they are healed. No baths, soaking, swimming, or prolonged water exposure for 4 weeks after surgery.    RETURN   Follow-up with Orthopedic Clinic as directed.     Future Appointments   Date Time Provider Department Center   3/22/2024  2:50 PM Liza Wang PA-C [facility]   3/22/2024  3:00 PM Zayra Ramirez OTR [facility]   5/7/2024  9:00 AM  [facility] De [facility]   1/14/2025 10:00 AM Janet Dodd PA-C [facility]       Call the [facility] at [phone] during business hours for any symptoms such as:    * Fevers with Temperature greater than 101.5 degrees.   * Pus drainage from wound site.   * Severe pain, not controlled by medication.   * Persistent nausea, vomiting and inablility to tolerate fluids.    If you are receiving care in [place], you may call the Orthopedic clinic at [phone].    FOR URGENT PROBLEMS ONLY, after hours or on weekends call the hospital  at [phone] and ask to speak with the orthopedic resident on call.   [facility]  Home Care Following Anesthesia  For 24 hours after surgery:  Get plenty of rest.  A responsible adult must stay with you for at least 24 hours after you leave the surgery center.  Do not drive or use heavy equipment.  If you have weakness or tingling, don't drive or use heavy equipment until this feeling goes away.   Do not drink alcohol.   Avoid strenuous or risky activities.  Ask for help when climbing stairs.  You may feel lightheaded.  IF so, sit for a few minutes before standing.  Have someone help you get up.   If you have nausea (feel sick to your stomach): Drink only clear liquids such as apple juice, ginger ale, broth or 7-Up.  Rest may also help.  Be sure to drink enough fluids.  Move to a regular diet as you feel able.   You may have a slight fever.  Call the doctor if your fever is over 100 F (37.7 C) (taken under the tongue) or lasts longer than 24 hours.  You may have a dry mouth, a sore throat, muscle aches or trouble sleeping. These should go away after 24 hours.  Do not make important or legal decisions.   It is recommended to avoid smoking.               Tips for taking pain medications  To get the best pain relief possible, remember these points:  Take pain medications as directed, before pain  becomes severe.  Pain medication can upset your stomach: taking it with food may help.  Constipation is a common side effect of pain medication. Drink plenty of  fluids.  Eat foods high in fiber. Take a stool softener if recommended by your doctor or pharmacist.  Do not drink alcohol, drive or operate machinery while taking pain medications.  Ask about other ways to control pain, such as with heat, ice or relaxation.    Tylenol/Acetaminophen Consumption    If you feel your pain relief is insufficient, you may take Tylenol/Acetaminophen in addition to your narcotic pain medication.   Be careful not to exceed 4,000 mg of Tylenol/Acetaminophen in a 24 hour period from all sources.  If you are taking extra strength Tylenol/acetaminophen (500 mg), the maximum dose is 8 tablets in 24 hours.  If you are taking regular strength acetaminophen (325 mg), the maximum dose is 12 tablets in 24 hours.    Call a doctor for any of the following:  Signs of infection (fever, growing tenderness at the surgery site, a large amount of drainage or bleeding, severe pain, foul-smelling drainage, redness, swelling).  It has been over 8 to 10 hours since surgery and you are still not able to urinate (pass water).  Headache for over 24 hours.  Numbness, tingling or weakness the day after surgery (if you had spinal anesthesia).  Signs of Covid-19 infection (temperature over 100 degrees, shortness of breath, cough, loss of taste/smell, generalized body aches, persistent headache, chills, sore throat, nausea/vomiting/diarrhea)  Your doctor is:       Dr. Jose Britt, Orthopaedics: [phone]               Or dial [phone] and ask for the resident on call for:  Orthopaedics  For emergency care, call the:  [facility] Emergency Department: [phone] (TTY for hearing impaired: [phone])               Post Operative Instructions: Regional Anesthetic for Upper Extremity with Liposomal Bupivacaine  General Information:   Regional anesthesia is  when local anesthetic or  numbing  medication is injected around the nerves to anesthetize or  numb  the area supplied by that set of nerves. It is a type of analgesia used to control pain and decreases the need for narcotics following surgery.    Types of Regional Blocks:  Interscalene: A block injected into the neck on the operative shoulder/arm of a patient having shoulder surgery  Supraclavicular: A block injected near the clavicle on the operative shoulder of a patient having elbow, forearm, or hand surgery    Procedure:  The type of anesthesia your doctor used to numb your shoulder or arm will usually not start to wear off for 24-48 hours, but may last as long as 72 hours. You should be careful during that period, since it is possible to injure your arm without being aware of the injury. While your arm is numb, you should:  Avoid striking or bumping your arm  Avoid extreme hot or cold    Diet:  There are no restrictions on your diet. You should drink plenty of fluids.     Discomfort:  You will have a tingling and prickly sensation in your arm as the feeling begins to return. You can also expect some discomfort. The amount of discomfort is unpredictable, but if you have more pain than can be controlled with pain medication you should notify your physician.     Pain Medications:  Begin taking your oral pain pills before bedtime and during the night to avoid a sudden onset of pain as part of the block wears off.  Do not engage in drinking, driving, or hazardous occupations while taking pain medication.     Stitches:   You may have stitches or special skin closures. You doctor will inform you when to return to the office to have them removed.     Activity:  On the day of surgery you should try to stay in bed with your hand elevated on pillows. You may resume your normal activity after that, wearing a sling for comfort. Contact your physician if you have any of the problems:   Continued numbness or tingling in the  "arm or hand after 72 hours  Swelling of the fingers or fingers that are cold to the touch  Excessive bleeding or drainage  Severe pain    Information about liposomal bupivacaine (Exparel)    What is Liposomal Bupivacaine?    Liposomal Bupivacaine is a numbing medication that can help you manage your pain after surgery.  This medication is similar to \"novacaine,\" which is often used by the dentist.  Liposomal bupivacaine is released slowly and can help control pain for up to 72 hours.    What is the purpose of Liposomal Bupivacaine?  To manage your pain after surgery  To help you sleep better, take deep breaths, walk more comfortable, and feel up to visiting with others    How is the procedure done?  Liposomal bupivacaine is a medication given by an injection.  It is usually given right before your surgery.  If this is the case, you will be awake or sedated, but you should experience minimal pain during the procedure.  For some people, the injection may be given at the very end of your surgery.  It all depends on the type of surgery and your situation.  The procedure usually takes about 5-15 minutes.  An ultrasound machine will help the anesthesiologist insert it in the right place or the surgeon will inject it under direct vision.   A needle is used to place the numbing medication under your skin.  It provides pain relief by numbing the tissue in the area where your surgeon will make the incision.    What can I expect?  You may experience numbness, tingling, or a feeling of heaviness around the area that was injected.  If you experience any of the follow symptoms IMMEDIATELY CALL THE REGIONAL ANESTHESIA PAIN SERVICE:  Numbness or tingling occurs in areas other than around the injection site  Blurry vision  Ringing in your ears  A metallic taste in your mouth    CALL the Regional Anesthesia Pain Service at:  [phone].  Press \"4\" for the  and let the hospital  know that you are having a problem " "with a nerve block and that you would like to page the \"adult care inpatient pain management/[facility] team\".  From 7 am - 5 pm, page the \"staff\" physician  From 5 pm - 7 am, page the \"resident\" physician (if no response from \"resident\" physician, call the  back and the \"staff\" physician).    You should not receive any other type of numbing medication within 4 days after receiving liposomal bupivacaine unless your anesthesiologist approves.   "
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
## 2024-03-15 NOTE — BRIEF OP NOTE
[facility]    Brief Operative Note    Pre-operative diagnosis: Rupture of right distal biceps tendon, initial encounter [S46.211A]  Post-operative diagnosis Same as pre-operative diagnosis    Procedure: REPAIR, TENDON, RIGHT BICEPS, DISTAL, Right - Elbow    Surgeon: Surgeon(s) and Role:     * Jose Britt MD - Primary     * Jogre Knox PA-C - Assisting     * Siobhan James MD - Resident - Assisting  Anesthesia: MAC with Block   Estimated Blood Loss: Less than 10 ml    Drains: None  Specimens: * No specimens in log *  Findings:   Degenerative changes of distal biceps tendon. 2 cm excised. Tendon still relatively unhealthy .  Complications: None.  Implants:   Implant Name Type Inv. Item Serial No.  Lot No. LRB No. Used Action   SYS ARTHRO FIX DEL IMP BICEP DOG BONE W/AC 18IN [id number] - [id number] Metallic Hardware/[employer] SYS ARTHRO FIX DEL IMP BICEP DOG BONE W/AC 18IN [id number]  [employer] [id number] Right 1 Implanted       Orthopedic Postoperative Plan:  - Activity: up ad dean, elevate arm and hand on pillows  - Weight bearing status: Minimize weight bearing and use of operative hand  - Antibiotics not indicated  - DVT ppx: Ambulation  - Pain control: local anesthetic injected at end of case. PRN ibuprofen and acetaminophen with 10 tabs oxycodone for breakthrough  - Dressing: Keep c/d/I until clinic follow up  - Follow up: 3/22 with MARCOS Wang Begin protocol, per Dr. Britt's distal biceps protocol  - Dispo: Discharge to home per PACU standard.    Siobhan James, PGY-4

## 2024-03-15 NOTE — ANESTHESIA POSTPROCEDURE EVALUATION
Patient: Tad Olivas    Procedure: Procedure(s):  REPAIR, TENDON, RIGHT BICEPS, DISTAL       Anesthesia Type:  No value filed.    Note:  Disposition: Outpatient   Postop Pain Control: Uneventful            Sign Out: Well controlled pain   PONV: No   Neuro/Psych: Uneventful            Sign Out: Acceptable/Baseline neuro status   Airway/Respiratory: Uneventful            Sign Out: Acceptable/Baseline resp. status   CV/Hemodynamics: Uneventful            Sign Out: Acceptable CV status; No obvious hypovolemia; No obvious fluid overload   Other NRE:    DID A NON-ROUTINE EVENT OCCUR?            Last vitals:  Vitals Value Taken Time   /93 03/15/24 1521   Temp 36.1  C (97  F) 03/15/24 1521   Pulse     Resp 16 03/15/24 1521   SpO2 95 % 03/15/24 1521       Electronically Signed By: Freddie Bacon MD  March 15, 2024  3:59 PM

## 2024-03-15 NOTE — OR NURSING
Patient received right side Brachial Plexus nerve block  without Exparel.  Fentanyl 50mcg and Versed 1mg given. Tolerated procedure well.   Joya Land RN

## 2024-03-15 NOTE — ANESTHESIA PROCEDURE NOTES
"Brachial plexus Procedure Note    Pre-Procedure   Staff -        Anesthesiologist:  Freddie Bacon MD       Resident/Fellow: Gayle Cruz MD       Performed By: with residents       Procedure performed by resident/fellow/CRNA in presence of a teaching physician.         Location: pre-op       Procedure Start/Stop Times: 3/15/2024 11:55 AM       Pre-Anesthestic Checklist: patient identified, IV checked, site marked, risks and benefits discussed, informed consent, monitors and equipment checked, pre-op evaluation, at physician/surgeon's request and post-op pain management  Timeout:       Correct Patient: Yes        Correct Procedure: Yes        Correct Site: Yes        Correct Position: Yes        Correct Laterality: Yes        Site Marked: Yes  Procedure Documentation  Procedure: Brachial plexus       Laterality: right       Patient Position: sitting       Skin prep: Chloraprep (supraclavicular approach).       Needle Type: short bevel       Needle Gauge: 21.        Needle Length (millimeters): 110        Ultrasound guided       1. Ultrasound was used to identify targeted nerve, plexus, vascular marker, or fascial plane and place a needle adjacent to it in real-time.       2. Ultrasound was used to visualize the spread of anesthetic in close proximity to the above referenced structure.       3. A permanent image is entered into the patient's record.    Assessment/Narrative         The placement was negative for: blood aspirated, painful injection and site bleeding       Paresthesias: No.       Bolus given via needle..        Secured via.        Insertion/Infusion Method: Single Shot       Complications: none    Medication(s) Administered   Bupivacaine 0.5% PF (Infiltration) - Infiltration   30 mL - 3/15/2024 11:55:00 AM  Medication Administration Time: 3/15/2024 11:55 AM      FOR [facility] ([facility]) ONLY:   Pain Team Contact information: please page the Pain Team Via [email or web address]. Search \"Pain\". During daytime hours, " please page the attending first. At night please page the resident first.

## 2024-03-15 NOTE — ANESTHESIA CARE TRANSFER NOTE
Patient: Tad Olivas    Procedure: Procedure(s):  REPAIR, TENDON, RIGHT BICEPS, DISTAL       Diagnosis: Rupture of right distal biceps tendon, initial encounter [S46.211A]  Diagnosis Additional Information: No value filed.    Anesthesia Type:   No value filed.     Note:    Oropharynx: oropharynx clear of all foreign objects  Level of Consciousness: awake  Oxygen Supplementation: room air    Independent Airway: airway patency satisfactory and stable  Dentition: dentition unchanged  Vital Signs Stable: post-procedure vital signs reviewed and stable  Report to RN Given: handoff report given  Patient transferred to: Phase II    Handoff Report: Identifed the Patient, Identified the Reponsible Provider, Reviewed the pertinent medical history, Discussed the surgical course, Reviewed Intra-OP anesthesia mangement and issues during anesthesia, Set expectations for post-procedure period and Allowed opportunity for questions and acknowledgement of understanding      Vitals:  Vitals Value Taken Time   BP     Temp     Pulse     Resp     SpO2         Electronically Signed By: MARILEE Silva CRNA  March 15, 2024  2:59 PM

## 2024-03-21 DIAGNOSIS — S46.219A: Primary | ICD-10-CM

## 2024-03-22 ENCOUNTER — THERAPY VISIT (OUTPATIENT)
Dept: OCCUPATIONAL THERAPY | Facility: CLINIC | Age: 69
End: 2024-03-22
Payer: COMMERCIAL

## 2024-03-22 ENCOUNTER — ANCILLARY PROCEDURE (OUTPATIENT)
Dept: GENERAL RADIOLOGY | Facility: CLINIC | Age: 69
End: 2024-03-22
Attending: PHYSICIAN ASSISTANT
Payer: COMMERCIAL

## 2024-03-22 ENCOUNTER — OFFICE VISIT (OUTPATIENT)
Dept: ORTHOPEDICS | Facility: CLINIC | Age: 69
End: 2024-03-22
Payer: COMMERCIAL

## 2024-03-22 DIAGNOSIS — Z98.890 POST-OPERATIVE STATE: ICD-10-CM

## 2024-03-22 DIAGNOSIS — M25.521 RIGHT ELBOW PAIN: Primary | ICD-10-CM

## 2024-03-22 DIAGNOSIS — Z47.89 AFTERCARE FOLLOWING SURGERY OF THE MUSCULOSKELETAL SYSTEM, NEC: ICD-10-CM

## 2024-03-22 DIAGNOSIS — S46.211D RUPTURE OF RIGHT BICEPS TENDON, SUBSEQUENT ENCOUNTER: Primary | ICD-10-CM

## 2024-03-22 DIAGNOSIS — S46.219A: ICD-10-CM

## 2024-03-22 PROCEDURE — 97535 SELF CARE MNGMENT TRAINING: CPT | Mod: GO

## 2024-03-22 PROCEDURE — 97760 ORTHOTIC MGMT&TRAING 1ST ENC: CPT | Mod: GO

## 2024-03-22 PROCEDURE — 99024 POSTOP FOLLOW-UP VISIT: CPT | Performed by: PHYSICIAN ASSISTANT

## 2024-03-22 PROCEDURE — 97165 OT EVAL LOW COMPLEX 30 MIN: CPT | Mod: GO

## 2024-03-22 PROCEDURE — 73080 X-RAY EXAM OF ELBOW: CPT | Mod: RT | Performed by: RADIOLOGY

## 2024-03-22 PROCEDURE — 97110 THERAPEUTIC EXERCISES: CPT | Mod: GO

## 2024-03-22 NOTE — PROGRESS NOTES
Date of Service: Mar 22, 2024    Chief Complaint: Post operative follow up.     Date of Surgery: 3/15/24    Procedure Performed: Right distal biceps tendon repair      Interval events: Tad Olivas is a 68 year old male who presents today for a postoperative follow up.  He is doing well.  Pain is well-controlled.  Denies numbness or tingling.    Patient reports he would like to discuss with Dr. Britt revision of his left distal biceps as well as left carpal tunnel syndrome.  He states he likely would not want to address this till the fall or end of the year.    The past medical history was reviewed updated in the EMR. This includes medications, surgeries, social history, and review of systems.    Physical examination:  Well-developed, well-nourished and in no acute distress.  Alert and oriented to surroundings.  On examination of the  right upper extremity, incision is well-healed. There is no erythema, drainage, or dehiscence. Swelling is Moderate.  Moderate ecchymosis about the volar proximal forearm.  Sensation and motor is intact in median, radial and ulnar nerve distributions. Patient can actively flex and extend all digits and thumb.  Palpable distal biceps tendon in the antecubital fossa.  Fingers are warm and well-perfused. Radial pulse is palpable. .    Radiographs: Three views of the right elbow were obtained and reviewed. These demonstrate able postsurgical changes of distal biceps button fixation within the proximal radius.      Assessment: 68 year old male s/p Right distal biceps tendon repair, progressing appropriately.     Plan:    -Can start to get the incision wet.  Sutures will fall off on their own.  No submerging till the wound is fully healed.  -To hand therapy for fabrication of a thermoplastic splint and initiation of therapies per protocol below.  -Up 6 weeks postop for recheck.    Distal Biceps Tendon Repair Protocol     2 visits per week for first 6 weeks, then once a week for 6 weeks      Weeks 0-2:     Brace/splint use at all times except may remove for exercises and bathing. Splint on at 90 degrees when in public and while sleeping.  Passive ROM only  Full flexion to 30 degrees short of full extension in neutral alignment  With the elbow flexed to 120 degrees, patient can work on active pronation, passive supination  Strict non-weight bearing (no resistance)  Wrist/shoulder ROM exercises unrestricted     Weeks 2-6:     Brace/Splint use at all times except may remove for exercise and bathing. Locked at 90 degrees when in public and while sleeping.  Continue passive ROM  Beginning the third week, patient may gradually progress to full extension by 6 weeks. Continue work on active pronation, passive supination throughout the arc of motion  Goal is for full extension and near full pronation/supination by 6 weeks postoperatively  Strict non-weight bearing through week 6  Continue shoulder and wrist ROM  Rotator cuff, deltoid isometrics as needed     Weeks 6-12:     Transition out of brace/splint  Progress from full PROM, then AAROM, then AROM as tolerated  Begin active flexion and supination of the elbow  Focus on regaining full active ROM before resistance training  Later in this phase, may begin light (1-2 lbs) resistance training with elbow flexion/supination     Weeks 12+:     Begin gradual elbow flexion/supination strengthening program as tolerated without restrictions.    MARGARITA WALLACE PA-C  March 22, 2024 2:13 PM   Orthopaedic Surgery

## 2024-03-22 NOTE — LETTER
3/22/2024         RE: Tad Olivas  [address]   [place] [address]        Dear Colleague,    Thank you for referring your patient, Tad Olivas, to the [facility] [place]. Please see a copy of my visit note below.    Date of Service: Mar 22, 2024    Chief Complaint: Post operative follow up.     Date of Surgery: 3/15/24    Procedure Performed: Right distal biceps tendon repair      Interval events: Tad Olivas is a 68 year old male who presents today for a postoperative follow up.  He is doing well.  Pain is well-controlled.  Denies numbness or tingling.    Patient reports he would like to discuss with Dr. Britt revision of his left distal biceps as well as left carpal tunnel syndrome.  He states he likely would not want to address this till the fall or end of the year.    The past medical history was reviewed updated in the EMR. This includes medications, surgeries, social history, and review of systems.    Physical examination:  Well-developed, well-nourished and in no acute distress.  Alert and oriented to surroundings.  On examination of the  right upper extremity, incision is well-healed. There is no erythema, drainage, or dehiscence. Swelling is Moderate.  Moderate ecchymosis about the volar proximal forearm.  Sensation and motor is intact in median, radial and ulnar nerve distributions. Patient can actively flex and extend all digits and thumb.  Palpable distal biceps tendon in the antecubital fossa.  Fingers are warm and well-perfused. Radial pulse is palpable. .    Radiographs: Three views of the right elbow were obtained and reviewed. These demonstrate able postsurgical changes of distal biceps button fixation within the proximal radius.      Assessment: 68 year old male s/p Right distal biceps tendon repair, progressing appropriately.     Plan:    -Can start to get the incision wet.  Sutures will fall off on their own.  No submerging till the wound is  fully healed.  -To hand therapy for fabrication of a thermoplastic splint and initiation of therapies per protocol below.  -Up 6 weeks postop for recheck.    Distal Biceps Tendon Repair Protocol     2 visits per week for first 6 weeks, then once a week for 6 weeks     Weeks 0-2:     Brace/splint use at all times except may remove for exercises and bathing. Splint on at 90 degrees when in public and while sleeping.  Passive ROM only  Full flexion to 30 degrees short of full extension in neutral alignment  With the elbow flexed to 120 degrees, patient can work on active pronation, passive supination  Strict non-weight bearing (no resistance)  Wrist/shoulder ROM exercises unrestricted     Weeks 2-6:     Brace/Splint use at all times except may remove for exercise and bathing. Locked at 90 degrees when in public and while sleeping.  Continue passive ROM  Beginning the third week, patient may gradually progress to full extension by 6 weeks. Continue work on active pronation, passive supination throughout the arc of motion  Goal is for full extension and near full pronation/supination by 6 weeks postoperatively  Strict non-weight bearing through week 6  Continue shoulder and wrist ROM  Rotator cuff, deltoid isometrics as needed     Weeks 6-12:     Transition out of brace/splint  Progress from full PROM, then AAROM, then AROM as tolerated  Begin active flexion and supination of the elbow  Focus on regaining full active ROM before resistance training  Later in this phase, may begin light (1-2 lbs) resistance training with elbow flexion/supination     Weeks 12+:     Begin gradual elbow flexion/supination strengthening program as tolerated without restrictions.    MARGARITA WALLACE PA-C  March 22, 2024 2:13 PM   Orthopaedic Surgery

## 2024-03-22 NOTE — PROGRESS NOTES
OCCUPATIONAL THERAPY EVALUATION  Type of Visit: Evaluation    Subjective      Presenting condition or subjective complaint:  s/p R distal biceps repair  Date of onset: 03/15/24    Relevant medical history:     Past Medical History:   Diagnosis Date    Biceps rupture, distal     Degenerative joint disease     right knee OA    Elevated cholesterol     Erectile dysfunction 12/29/2010    Gastroesophageal reflux disease     Hypertension     Moderate major depression (H) 12/29/2010    Renal cysts, acquired, bilateral 09/26/2012       Dates & types of surgery:    Past Surgical History:   Procedure Laterality Date    BIOPSY  2017    colon    COLONOSCOPY  2017    EYE SURGERY Bilateral     Cataract    ORTHOPEDIC SURGERY      right knee replacement    RELEASE CARPAL TUNNEL Right 04/29/2022    Procedure: RELEASE, CARPAL TUNNEL, right;  Surgeon: FRANKIE Linder MD;  Location: [facility] OR    REPAIR TENDON BICEPS DISTAL UPPER EXTREMITY Left     10/15/10    REPAIR TENDON BICEPS DISTAL UPPER EXTREMITY Right 3/15/2024    Procedure: REPAIR, TENDON, RIGHT BICEPS, DISTAL;  Surgeon: Jose Britt MD;  Location: [facility] OR    SOFT TISSUE SURGERY      SURGICAL HISTORY OF -       rt shoulder cartilage repair    SURGICAL HISTORY OF -       cartilage .ligament ,arthroscopy    URETHROPLASTY WITH BUCCAL GRAFT N/A 10/14/2016    Procedure: URETHROPLASTY WITH BUCCAL GRAFT;  Surgeon: Christiano Leger MD;  Location: [facility] OR    UROLOGY PROCEDURE                         DATE:  10/15/2016    urithral stricture          Prior diagnostic imaging/testing results:     X-ray   Prior therapy history for the same diagnosis, illness or injury:    no    Prior Level of Function  Indep in all areas     Living Environment  Social support:   Lives alone    Employment:     mostly retired   Hobbies/Interests:   golf by summer, boat, 4wheeler, motorcycle     Patient goals for therapy:   regain full use and strength     Pain assessment: Pain denied     Objective      Right hand dominant  Patient reports symptoms of pain, stiffness/loss of motion, and weakness/loss of strength    Pain Level (Scale 0-10)   3/22/2024   At Rest 0   With Use N/A     Posture  Rounded Forward Shoulders    Sensation  WNL throughout all nerve distributions; per patient report    Edema: 37cm at elbow crease    ROM  Pain Report: - none  + mild    ++ moderate    +++ severe   Elbow 3/22/2024 3/22/2024   AROM (PROM) L R   Extension 0 (-38)   Flexion 136 (130)   Supination 45 (hx injury) (65)   Pronation 90 (45)     Wrist 3/22/2024 3/22/2024   AROM (PROM) L R   Extension 60 46   Flexion 45 60   RD     UD         Strength  -contraindicated          Assessment & Plan   CLINICAL IMPRESSIONS  Medical Diagnosis: R distal biceps repair    Treatment Diagnosis: R distal biceps repair, aftercare following surgery    Impression/Assessment: Pt is a 68 year old male presenting to Occupational Therapy due to s/p R distal biceps repair.  The following significant findings have been identified: Impaired activity tolerance, Impaired ROM, Impaired strength, Pain, and Post-surgical precautions.  These identified deficits interfere with their ability to perform self care tasks, recreational activities, household chores, and meal planning and preparation as compared to previous level of function.   Patient's limitations or Problem List includes: Pain, Decreased ROM/motion, Increased edema, and Weakness of the right elbow which interferes with the patient's ability to perform Self Care Tasks (dressing, bathing), Recreational Activities, and Household Chores as compared to previous level of function.    Clinical Decision Making (Complexity):  Assessment of Occupational Performance: 5 or more Performance Deficits  Occupational Performance Limitations: bathing/showering, dressing, hygiene and grooming, home establishment and management, meal preparation and cleanup, and leisure activities  Clinical Decision Making (Complexity):  Low complexity    PLAN OF CARE  Treatment Interventions:  Modalities:  US  Therapeutic Exercise:  AROM, AAROM, PROM, Isotonics, and Isometrics  Neuromuscular re-education:  Nerve Gliding, Sensory re-education, Desensitization, and Proprioceptive Training  Manual Techniques:  Joint mobilization, Scar mobilization, Myofascial release, and Manual edema mobilization  Orthotic Fabrication:  Static  Self Care:  Self Care Tasks and Ergonomic Considerations    Long Term Goals   OT Goal 1  Goal Description: Pt will report ability to complete household chores w/ use of levy UEs w/ out difficulty for return to PLOF in I/ADLs by 6/14/24.  Rationale: In order to maximize safety and independence with ADL/IADLs  Target Date: 06/14/24      Frequency of Treatment: 1x/week  Duration of Treatment: 12 weeks     Recommended Referrals to Other Professionals:  None  Education Assessment: Learner/Method: Patient  Education Comments: No barriers to learning     Risks and benefits of evaluation/treatment have been explained.   Patient/Family/caregiver agrees with Plan of Care.     Evaluation Time:    OT Eval, Low Complexity Minutes (35240): 15      Signing Clinician: CHELO Back [facility]                                                                                   OUTPATIENT OCCUPATIONAL THERAPY      PLAN OF TREATMENT FOR OUTPATIENT REHABILITATION   Patient's Last Name, First Name, Tad Segovia YOB: 1955   Provider's Name   [facility]   Medical Record No.  [id number]     Onset Date: 03/15/24 Start of Care Date: 03/22/24     Medical Diagnosis:  R distal biceps repair      OT Treatment Diagnosis:  R distal biceps repair, aftercare following surgery Plan of Treatment  Frequency/Duration:1x/week/12 weeks    Certification date from 03/22/24   To 06/14/24        See note for plan of treatment details and functional goals     Zayra Ramirez  OTR                         I CERTIFY THE NEED FOR THESE SERVICES FURNISHED UNDER        THIS PLAN OF TREATMENT AND WHILE UNDER MY CARE     (Physician attestation of this document indicates review and certification of the therapy plan).              Referring Provider:  Liza Wang    Initial Assessment  See Epic Evaluation- 03/22/24

## 2024-03-22 NOTE — PATIENT INSTRUCTIONS
Distal Biceps Tendon Repair Protocol     2 visits per week for first 6 weeks, then once a week for 6 weeks     Weeks 0-2:     Brace/splint use at all times except may remove for exercises and bathing. Locked at 90 degrees when in public and while sleeping.  Passive ROM only  Full flexion to 30 degrees short of full extension in neutral alignment  With the elbow flexed to 120 degrees, patient can work on active pronation, passive supination  Strict non-weight bearing (no resistance)  Wrist/shoulder ROM exercises unrestricted     Weeks 2-6:     Brace/Splint use at all times except may remove for exercise and bathing. Locked at 90 degrees when in public and while sleeping.  Continue passive ROM  Beginning the third week, patient may gradually progress to full extension by 6 weeks. Continue work on active pronation, passive supination throughout the arc of motion  Goal is for full extension and near full pronation/supination by 6 weeks postoperatively  Strict non-weight bearing through week 6  Continue shoulder and wrist ROM  Rotator cuff, deltoid isometrics as needed     Weeks 6-12:     Transition out of brace/splint  Progress from full PROM, then AAROM, then AROM as tolerated  Begin active flexion and supination of the elbow  Focus on regaining full active ROM before resistance training  Later in this phase, may begin light (1-2 lbs) resistance training with elbow flexion/supination     Weeks 12+:     Begin gradual elbow flexion/supination strengthening program as tolerated without restrictions.

## 2024-03-22 NOTE — NURSING NOTE
Reason For Visit:   Chief Complaint   Patient presents with    RECHECK     DOS: 3/15/24 /REPAIR, TENDON, BICEPS, DISTAL (Right)       Primary MD: Clement De Jesus  Ref. MD: DAISY    Age: 68 year old    ?  No      There were no vitals taken for this visit.      Pain Assessment  Patient Currently in Pain: Denies    Hand Dominance Evaluation  Hand Dominance: Right          Quick[facility] Assessment      3/22/2024     9:34 AM   [facility] Main   1. Open a tight or new jar Unable   2. Do heavy household chores (e.g., wash walls, floors) Unable   3. Carry a shopping bag or briefcase Mild difficulty   4. Wash your back Unable   5. Use a knife to cut food Unable   6. Recreational activities in which you take some force or impact through your arm, shoulder or hand (e.g., golf, hammering, tennis, etc.) Unable   7. During the past week, to what extent has your arm, shoulder or hand problem interfered with your normal social activities with family, friends, neighbours or groups Unable to answer   8. During the past week, were you limited in your work or other regular daily activities as a result of your arm, shoulder or hand problem Unable   9. Arm, shoulder or hand pain None   10.Tingling (pins and needles) in your arm,shoulder or hand None   11. During the past week, how much difficulty have you had sleeping because of the pain in your arm, shoulder or hand No difficulty   Quick[facility] Ability Score 54.55          Current Outpatient Medications   Medication Sig Dispense Refill    acetaminophen (TYLENOL) 500 MG tablet Take 1-2 tablets (500-1,000 mg) by mouth every 8 hours as needed for mild pain 40 tablet 0    albuterol (PROAIR HFA/PROVENTIL HFA/VENTOLIN HFA) 108 (90 Base) MCG/ACT inhaler Inhale 2 puffs into the lungs every 6 hours 8 g 1    amLODIPine (NORVASC) 5 MG tablet TAKE ONE TABLET BY MOUTH ONCE DAILY (Patient taking differently: Take 5 mg by mouth every morning TAKE ONE TABLET BY MOUTH ONCE DAILY) 90 tablet 1     amoxicillin (AMOXIL) 500 MG capsule Take 4 capsules (2,000 mg) by mouth daily For one dose prior to dental procedures 4 capsule 0    ascorbic acid (VITAMIN C) 500 MG tablet Take 1 tablet by mouth daily. (Patient taking differently: Take 500 mg by mouth every morning) 100 tablet 3    atorvastatin (LIPITOR) 80 MG tablet TAKE ONE TABLET BY MOUTH ONCE DAILY FOR CHOLESTEROL (Patient taking differently: Take 80 mg by mouth every morning TAKE ONE TABLET BY MOUTH ONCE DAILY FOR CHOLESTEROL) 90 tablet 2    buPROPion (WELLBUTRIN SR) 100 MG 12 hr tablet TAKE ONE TABLET BY MOUTH EVERY MORNING (Patient taking differently: 100 mg every morning TAKE ONE TABLET BY MOUTH EVERY MORNING) 90 tablet 0    Cholecalciferol (VITAMIN D3 PO) Take 2,000 Units by mouth every morning      cyclobenzaprine (FLEXERIL) 10 MG tablet Take 1 tablet (10 mg) by mouth nightly as needed for muscle spasms 30 tablet 0    finasteride (PROSCAR) 5 MG tablet Take 1 tablet (5 mg) by mouth daily (Patient taking differently: Take 1 tablet by mouth every morning) 90 tablet 3    fish oil-omega-3 fatty acids 500 MG capsule Take by mouth every morning      FLUoxetine (PROZAC) 20 MG capsule TAKE ONE CAPSULE BY MOUTH ONCE DAILY (Patient taking differently: Take 20 mg by mouth every morning) 90 capsule 0    Glucosamine-Chondroit-Vit C-Mn (GLUCOSAMINE-CHONDROITINOITIN) CAPS Take 1 each by mouth daily. (Patient taking differently: Take 1 each by mouth every morning) 90 capsule 3    Multiple vitamin TABS Take 1 tablet by mouth daily. (Patient taking differently: Take 1 tablet by mouth every morning) 90 tablet 3    Naproxen Sodium (ALEVE PO) Take 220 mg by mouth every morning      omeprazole (PRILOSEC) 20 MG DR capsule TAKE ONE CAPSULE BY MOUTH EVERY OTHER DAY 30-60 MINUTES BEFORE A MEAL FOR HEARTBURN (Patient taking differently: 20 mg every other day TAKE ONE CAPSULE BY MOUTH EVERY OTHER DAY 30-60 MINUTES BEFORE A MEAL FOR HEARTBURN) 45 capsule 3    tadalafil (CIALIS) 5 MG  tablet Take 1 tablet (5 mg) by mouth daily (Patient taking differently: Take 5 mg by mouth every morning) 90 tablet 3    tamsulosin (FLOMAX) 0.4 MG capsule Take 1 capsule (0.4 mg) by mouth daily (Patient taking differently: Take 0.4 mg by mouth every morning) 90 capsule 3       No Known Allergies    Anna Marie Reis LPN

## 2024-03-27 ENCOUNTER — THERAPY VISIT (OUTPATIENT)
Dept: OCCUPATIONAL THERAPY | Facility: CLINIC | Age: 69
End: 2024-03-27
Payer: COMMERCIAL

## 2024-03-27 DIAGNOSIS — M25.521 RIGHT ELBOW PAIN: Primary | ICD-10-CM

## 2024-03-27 DIAGNOSIS — Z47.89 AFTERCARE FOLLOWING SURGERY OF THE MUSCULOSKELETAL SYSTEM, NEC: ICD-10-CM

## 2024-03-27 PROCEDURE — 97535 SELF CARE MNGMENT TRAINING: CPT | Mod: GO | Performed by: OCCUPATIONAL THERAPIST

## 2024-03-27 PROCEDURE — 97110 THERAPEUTIC EXERCISES: CPT | Mod: GO | Performed by: OCCUPATIONAL THERAPIST

## 2024-04-01 ENCOUNTER — THERAPY VISIT (OUTPATIENT)
Dept: OCCUPATIONAL THERAPY | Facility: CLINIC | Age: 69
End: 2024-04-01
Payer: COMMERCIAL

## 2024-04-01 DIAGNOSIS — Z47.89 AFTERCARE FOLLOWING SURGERY OF THE MUSCULOSKELETAL SYSTEM, NEC: ICD-10-CM

## 2024-04-01 DIAGNOSIS — M25.521 RIGHT ELBOW PAIN: Primary | ICD-10-CM

## 2024-04-01 PROCEDURE — 97110 THERAPEUTIC EXERCISES: CPT | Mod: GO | Performed by: OCCUPATIONAL THERAPIST

## 2024-04-03 ENCOUNTER — THERAPY VISIT (OUTPATIENT)
Dept: OCCUPATIONAL THERAPY | Facility: CLINIC | Age: 69
End: 2024-04-03
Payer: COMMERCIAL

## 2024-04-03 DIAGNOSIS — M25.521 RIGHT ELBOW PAIN: Primary | ICD-10-CM

## 2024-04-03 DIAGNOSIS — Z47.89 AFTERCARE FOLLOWING SURGERY OF THE MUSCULOSKELETAL SYSTEM, NEC: ICD-10-CM

## 2024-04-03 PROCEDURE — 97110 THERAPEUTIC EXERCISES: CPT | Mod: GO | Performed by: OCCUPATIONAL THERAPIST

## 2024-04-10 ENCOUNTER — THERAPY VISIT (OUTPATIENT)
Dept: OCCUPATIONAL THERAPY | Facility: CLINIC | Age: 69
End: 2024-04-10
Payer: COMMERCIAL

## 2024-04-10 DIAGNOSIS — Z47.89 AFTERCARE FOLLOWING SURGERY OF THE MUSCULOSKELETAL SYSTEM, NEC: ICD-10-CM

## 2024-04-10 DIAGNOSIS — M25.521 RIGHT ELBOW PAIN: Primary | ICD-10-CM

## 2024-04-10 PROCEDURE — 97140 MANUAL THERAPY 1/> REGIONS: CPT | Mod: GO | Performed by: OCCUPATIONAL THERAPIST

## 2024-04-10 PROCEDURE — 97110 THERAPEUTIC EXERCISES: CPT | Mod: GO | Performed by: OCCUPATIONAL THERAPIST

## 2024-04-12 DIAGNOSIS — F33.1 MAJOR DEPRESSIVE DISORDER, RECURRENT EPISODE, MODERATE (H): ICD-10-CM

## 2024-04-17 ENCOUNTER — THERAPY VISIT (OUTPATIENT)
Dept: OCCUPATIONAL THERAPY | Facility: CLINIC | Age: 69
End: 2024-04-17
Payer: COMMERCIAL

## 2024-04-17 DIAGNOSIS — Z47.89 AFTERCARE FOLLOWING SURGERY OF THE MUSCULOSKELETAL SYSTEM, NEC: ICD-10-CM

## 2024-04-17 DIAGNOSIS — M25.521 RIGHT ELBOW PAIN: Primary | ICD-10-CM

## 2024-04-17 PROCEDURE — 97140 MANUAL THERAPY 1/> REGIONS: CPT | Mod: GO | Performed by: OCCUPATIONAL THERAPIST

## 2024-04-17 PROCEDURE — 97110 THERAPEUTIC EXERCISES: CPT | Mod: GO | Performed by: OCCUPATIONAL THERAPIST

## 2024-04-23 ENCOUNTER — OFFICE VISIT (OUTPATIENT)
Dept: ORTHOPEDICS | Facility: CLINIC | Age: 69
End: 2024-04-23
Payer: COMMERCIAL

## 2024-04-23 ENCOUNTER — ANCILLARY PROCEDURE (OUTPATIENT)
Dept: GENERAL RADIOLOGY | Facility: CLINIC | Age: 69
End: 2024-04-23
Attending: FAMILY MEDICINE
Payer: COMMERCIAL

## 2024-04-23 VITALS
WEIGHT: 275 LBS | DIASTOLIC BLOOD PRESSURE: 92 MMHG | HEART RATE: 79 BPM | BODY MASS INDEX: 39.46 KG/M2 | SYSTOLIC BLOOD PRESSURE: 165 MMHG

## 2024-04-23 DIAGNOSIS — M25.562 LEFT KNEE PAIN: ICD-10-CM

## 2024-04-23 DIAGNOSIS — M25.562 CHRONIC PAIN OF LEFT KNEE: ICD-10-CM

## 2024-04-23 DIAGNOSIS — G89.29 CHRONIC PAIN OF LEFT KNEE: ICD-10-CM

## 2024-04-23 DIAGNOSIS — M17.12 ARTHRITIS OF LEFT KNEE: Primary | ICD-10-CM

## 2024-04-23 PROCEDURE — 99214 OFFICE O/P EST MOD 30 MIN: CPT | Mod: 25 | Performed by: FAMILY MEDICINE

## 2024-04-23 PROCEDURE — 20611 DRAIN/INJ JOINT/BURSA W/US: CPT | Mod: LT | Performed by: FAMILY MEDICINE

## 2024-04-23 PROCEDURE — 73562 X-RAY EXAM OF KNEE 3: CPT | Mod: TC | Performed by: RADIOLOGY

## 2024-04-23 RX ADMIN — ROPIVACAINE HYDROCHLORIDE 4 ML: 5 INJECTION, SOLUTION EPIDURAL; INFILTRATION; PERINEURAL at 13:34

## 2024-04-23 RX ADMIN — BETAMETHASONE SODIUM PHOSPHATE AND BETAMETHASONE ACETATE 6 MG: 3; 3 INJECTION, SUSPENSION INTRA-ARTICULAR; INTRALESIONAL; INTRAMUSCULAR; SOFT TISSUE at 13:34
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
## 2024-04-24 ENCOUNTER — THERAPY VISIT (OUTPATIENT)
Dept: OCCUPATIONAL THERAPY | Facility: CLINIC | Age: 69
End: 2024-04-24
Payer: COMMERCIAL

## 2024-04-24 DIAGNOSIS — M25.521 RIGHT ELBOW PAIN: Primary | ICD-10-CM

## 2024-04-24 DIAGNOSIS — Z47.89 AFTERCARE FOLLOWING SURGERY OF THE MUSCULOSKELETAL SYSTEM, NEC: ICD-10-CM

## 2024-04-24 PROCEDURE — 97110 THERAPEUTIC EXERCISES: CPT | Mod: GO | Performed by: OCCUPATIONAL THERAPIST

## 2024-04-24 PROCEDURE — 97140 MANUAL THERAPY 1/> REGIONS: CPT | Mod: GO | Performed by: OCCUPATIONAL THERAPIST

## 2024-04-25 RX ORDER — ROPIVACAINE HYDROCHLORIDE 5 MG/ML
4 INJECTION, SOLUTION EPIDURAL; INFILTRATION; PERINEURAL
Status: SHIPPED | OUTPATIENT
Start: 2024-04-23

## 2024-04-25 RX ORDER — BETAMETHASONE SODIUM PHOSPHATE AND BETAMETHASONE ACETATE 3; 3 MG/ML; MG/ML
6 INJECTION, SUSPENSION INTRA-ARTICULAR; INTRALESIONAL; INTRAMUSCULAR; SOFT TISSUE
Status: SHIPPED | OUTPATIENT
Start: 2024-04-23

## 2024-04-29 ENCOUNTER — OFFICE VISIT (OUTPATIENT)
Dept: ORTHOPEDICS | Facility: CLINIC | Age: 69
End: 2024-04-29
Payer: COMMERCIAL

## 2024-04-29 DIAGNOSIS — S46.211D RUPTURE OF RIGHT BICEPS TENDON, SUBSEQUENT ENCOUNTER: Primary | ICD-10-CM

## 2024-04-29 DIAGNOSIS — Z98.890 POST-OPERATIVE STATE: ICD-10-CM

## 2024-04-29 DIAGNOSIS — G56.02 CARPAL TUNNEL SYNDROME OF LEFT WRIST: ICD-10-CM

## 2024-04-29 PROCEDURE — 20526 THER INJECTION CARP TUNNEL: CPT | Mod: 79 | Performed by: PHYSICIAN ASSISTANT

## 2024-04-29 PROCEDURE — 99024 POSTOP FOLLOW-UP VISIT: CPT | Performed by: PHYSICIAN ASSISTANT

## 2024-04-29 RX ORDER — DEXAMETHASONE SODIUM PHOSPHATE 4 MG/ML
4 INJECTION, SOLUTION INTRA-ARTICULAR; INTRALESIONAL; INTRAMUSCULAR; INTRAVENOUS; SOFT TISSUE
Status: SHIPPED | OUTPATIENT
Start: 2024-04-29

## 2024-04-29 RX ORDER — LIDOCAINE HYDROCHLORIDE 10 MG/ML
2 INJECTION, SOLUTION EPIDURAL; INFILTRATION; INTRACAUDAL; PERINEURAL
Status: SHIPPED | OUTPATIENT
Start: 2024-04-29

## 2024-04-29 RX ADMIN — DEXAMETHASONE SODIUM PHOSPHATE 4 MG: 4 INJECTION, SOLUTION INTRA-ARTICULAR; INTRALESIONAL; INTRAMUSCULAR; INTRAVENOUS; SOFT TISSUE at 10:35

## 2024-04-29 RX ADMIN — LIDOCAINE HYDROCHLORIDE 2 ML: 10 INJECTION, SOLUTION EPIDURAL; INFILTRATION; INTRACAUDAL; PERINEURAL at 10:35

## 2024-04-29 NOTE — LETTER
4/29/2024         RE: Tad Olivas  [address]   [place] [address]        Dear Colleague,    Thank you for referring your patient, Tad Olivas, to the [facility] [place]. Please see a copy of my visit note below.    Date of Service: Apr 29, 2024    Chief Complaint: Post operative follow up.     Date of Surgery: 3/15/24    Procedure Performed: Right distal biceps tendon repair      Interval events: Tad Olivas is a 68 year old male who presents today for a postoperative follow up.  He is doing well.  Pain is well-controlled.  Denies numbness or tingling.    Patient reports he would like to discuss with Dr. Britt revision of his left distal biceps as well as left carpal tunnel syndrome.  He states he likely would not want to address this until after Thanksgiving.  Patient reports his carpal tunnel symptoms of the left hand have become more bothersome since recovering from his right upper extremity surgery.  He notes numbness and tingling of the thumb, index, middle finger.  This is worse at night or when holding a book or driving.  He has not had any prior steroid injections.    The past medical history was reviewed updated in the EMR. This includes medications, surgeries, social history, and review of systems.    Physical examination:  Well-developed, well-nourished and in no acute distress.  Alert and oriented to surroundings.  On examination of the  right upper extremity, incision is well-healed. There is no erythema, drainage, or dehiscence. Swelling is Moderate.  Moderate ecchymosis about the volar proximal forearm.  Sensation and motor is intact in median, radial and ulnar nerve distributions. Patient can actively flex and extend all digits and thumb.  Palpable distal biceps tendon in the antecubital fossa.  Fingers are warm and well-perfused. Radial pulse is palpable.  Active range of motion of the elbow from  degrees.    On examination of the left upper  extremity, positive Tinel's at the carpal tunnel, positive Phalen's, decree sensation to light touch in the median nerve distribution.    Radiographs: Three views of the right elbow were obtained and reviewed. These demonstrate able postsurgical changes of distal biceps button fixation within the proximal radius.      Assessment: 68 year old male s/p Right distal biceps tendon repair, progressing appropriately.  Left carpal tunnel syndrome.    Plan:    - Continue hand therapy per protocol below.  -Follow-up up 12 weeks postop for recheck Dr. Britt.  - Will discuss at his next follow-up preoperative imaging for evaluation of possible reconstruction of the left distal biceps, as well as left carpal tunnel release.  Patient likely like to do these in the same surgical setting, and after Thanksgiving this year.   -Offered corticosteroid injection for the left carpal tunnel today for symptomatic management until surgical release later this year.  Patient agreeable.    Procedure: Left carpal tunnel injection  After informed consent was obtained, the skin was prepped with chloraprep. A mixture of 2 mL of 1% lidocaine and 1 mL (4 mg) of dexamethasone was injected into the patients left tunnel. A bandaid was applied. Post-injection care instructions were given. The patient tolerated the procedure well.         Distal Biceps Tendon Repair Protocol     2 visits per week for first 6 weeks, then once a week for 6 weeks     Weeks 0-2:     Brace/splint use at all times except may remove for exercises and bathing. Splint on at 90 degrees when in public and while sleeping.  Passive ROM only  Full flexion to 30 degrees short of full extension in neutral alignment  With the elbow flexed to 120 degrees, patient can work on active pronation, passive supination  Strict non-weight bearing (no resistance)  Wrist/shoulder ROM exercises unrestricted     Weeks 2-6:     Brace/Splint use at all times except may remove for exercise and bathing.  Locked at 90 degrees when in public and while sleeping.  Continue passive ROM  Beginning the third week, patient may gradually progress to full extension by 6 weeks. Continue work on active pronation, passive supination throughout the arc of motion  Goal is for full extension and near full pronation/supination by 6 weeks postoperatively  Strict non-weight bearing through week 6  Continue shoulder and wrist ROM  Rotator cuff, deltoid isometrics as needed     Weeks 6-12:     Transition out of brace/splint  Progress from full PROM, then AAROM, then AROM as tolerated  Begin active flexion and supination of the elbow  Focus on regaining full active ROM before resistance training  Later in this phase, may begin light (1-2 lbs) resistance training with elbow flexion/supination     Weeks 12+:     Begin gradual elbow flexion/supination strengthening program as tolerated without restrictions.    LIZA WALLACE PA-C  Orthopaedic Surgery      Hand / Upper Extremity Injection/Arthrocentesis: L carpal tunnel    Date/Time: 2024 10:35 AM    Performed by: Liza Wallace PA-C  Authorized by: Liza Wallace PA-C    Indications:  Pain  Needle Size:  25 G  Guidance: landmark    Condition: carpal tunnel      Site:  L carpal tunnel  Medications:  4 mg dexAMETHasone 4 MG/ML; 2 mL lidocaine (PF) 1 %  Outcome:  Tolerated well, no immediate complications  Procedure discussed: discussed risks, benefits, and alternatives    Consent Given by:  Patient  Timeout: timeout called immediately prior to procedure    Prep: patient was prepped and draped in usual sterile fashion        [facility] [address] [address] [phone]  Dept: [phone]  ______________________________________________________________________________    Patient: Tad Olivas   : 1955   MRN: [id number]   2024    INVASIVE PROCEDURE SAFETY CHECKLIST    Date:  2024   Procedure:Left Wrist carpal tunnel steroid injection  Patient Name: Tad Olivas  MRN: [id number]  YOB: 1955    Action: Complete sections as appropriate. Any discrepancy results in a HARD COPY until resolved.     PRE PROCEDURE:  Patient ID verified with 2 identifiers (name and  or MRN): Yes  Procedure and site verified with patient/designee (when able): Yes  Accurate consent documentation in medical record: Yes  H&P (or appropriate assessment) documented in medical record: Yes  H&P must be up to 20 days prior to procedure and updates within 24 hours of procedure as applicable: Yes  Relevant diagnostic and radiology test results appropriately labeled and displayed as applicable: NA  Procedure site(s) marked with provider initials: NA    TIMEOUT:  Time-Out performed immediately prior to starting procedure, including verbal and active participation of all team members addressing the following:Yes  * Correct patient identify  * Confirmed that the correct side and site are marked  * An accurate procedure consent form  * Agreement on the procedure to be done  * Correct patient position  * Relevant images and results are properly labeled and appropriately displayed  * The need to administer antibiotics or fluids for irrigation purposes during the procedure as applicable   * Safety precautions based on patient history or medication use    DURING PROCEDURE: Verification of correct person, site, and procedures any time the responsibility for care of the patient is transferred to another member of the care team.       The following medications were given:         Prior to injection, verified patient identity using patient's name and date of birth.  Due to injection administration, patient instructed to remain in clinic for 15 minutes  afterwards, and to report any adverse reaction to me immediately.    Tendon sheath injection was performed.   Medication Name: Dexamethasone Sodium Phosphate NDC  18402-758-19  Drug Amount Wasted:  None.  Vial/Syringe: Single dose vial  Expiration Date:  4/1/25    Medication Name Lidocaine 1% NDC 93474-697-57    Scribed by MALINI BEY, VIANCA for   Liza Wang PA-C  on April 29, 2024 at 10:39am based on the provider's statements to me.     MALINI BEY, ATC

## 2024-04-29 NOTE — PROGRESS NOTES
Hand / Upper Extremity Injection/Arthrocentesis: L carpal tunnel    Date/Time: 2024 10:35 AM    Performed by: Liza Wang PA-C  Authorized by: Liza Wang PA-C    Indications:  Pain  Needle Size:  25 G  Guidance: landmark    Condition: carpal tunnel      Site:  L carpal tunnel  Medications:  4 mg dexAMETHasone 4 MG/ML; 2 mL lidocaine (PF) 1 %  Outcome:  Tolerated well, no immediate complications  Procedure discussed: discussed risks, benefits, and alternatives    Consent Given by:  Patient  Timeout: timeout called immediately prior to procedure    Prep: patient was prepped and draped in usual sterile fashion        [facility] [address]  [address] [facility] [id number] [phone]  Dept: [phone]  ______________________________________________________________________________    Patient: Tad Olivas   : 1955   MRN: [id number]   2024    INVASIVE PROCEDURE SAFETY CHECKLIST    Date: 2024   Procedure:Left Wrist carpal tunnel steroid injection  Patient Name: Tad Olivas  MRN: [id number]  YOB: 1955    Action: Complete sections as appropriate. Any discrepancy results in a HARD COPY until resolved.     PRE PROCEDURE:  Patient ID verified with 2 identifiers (name and  or MRN): Yes  Procedure and site verified with patient/designee (when able): Yes  Accurate consent documentation in medical record: Yes  H&P (or appropriate assessment) documented in medical record: Yes  H&P must be up to 20 days prior to procedure and updates within 24 hours of procedure as applicable: Yes  Relevant diagnostic and radiology test results appropriately labeled and displayed as applicable: NA  Procedure site(s) marked with provider initials: NA    TIMEOUT:  Time-Out performed immediately prior to starting procedure, including verbal and active participation of all team members addressing the following:Yes  *  Correct patient identify  * Confirmed that the correct side and site are marked  * An accurate procedure consent form  * Agreement on the procedure to be done  * Correct patient position  * Relevant images and results are properly labeled and appropriately displayed  * The need to administer antibiotics or fluids for irrigation purposes during the procedure as applicable   * Safety precautions based on patient history or medication use    DURING PROCEDURE: Verification of correct person, site, and procedures any time the responsibility for care of the patient is transferred to another member of the care team.       The following medications were given:         Prior to injection, verified patient identity using patient's name and date of birth.  Due to injection administration, patient instructed to remain in clinic for 15 minutes  afterwards, and to report any adverse reaction to me immediately.    Tendon sheath injection was performed.   Medication Name: Dexamethasone Sodium Phosphate NDC 07789-238-30  Drug Amount Wasted:  None.  Vial/Syringe: Single dose vial  Expiration Date:  4/1/25    Medication Name Lidocaine 1% NDC 14626-258-44    Scribed by MALINI BEY, ATC for   Liza Wang PA-C  on April 29, 2024 at 10:39am based on the provider's statements to me.     MALINI BEY, ATC

## 2024-04-29 NOTE — PROGRESS NOTES
Date of Service: Apr 29, 2024    Chief Complaint: Post operative follow up.     Date of Surgery: 3/15/24    Procedure Performed: Right distal biceps tendon repair      Interval events: Tad Olivas is a 68 year old male who presents today for a postoperative follow up.  He is doing well.  Pain is well-controlled.  Denies numbness or tingling.    Patient reports he would like to discuss with Dr. Britt revision of his left distal biceps as well as left carpal tunnel syndrome.  He states he likely would not want to address this until after Thanksgiving.  Patient reports his carpal tunnel symptoms of the left hand have become more bothersome since recovering from his right upper extremity surgery.  He notes numbness and tingling of the thumb, index, middle finger.  This is worse at night or when holding a book or driving.  He has not had any prior steroid injections.    The past medical history was reviewed updated in the EMR. This includes medications, surgeries, social history, and review of systems.    Physical examination:  Well-developed, well-nourished and in no acute distress.  Alert and oriented to surroundings.  On examination of the  right upper extremity, incision is well-healed. There is no erythema, drainage, or dehiscence. Swelling is Moderate.  Moderate ecchymosis about the volar proximal forearm.  Sensation and motor is intact in median, radial and ulnar nerve distributions. Patient can actively flex and extend all digits and thumb.  Palpable distal biceps tendon in the antecubital fossa.  Fingers are warm and well-perfused. Radial pulse is palpable.  Active range of motion of the elbow from  degrees.    On examination of the left upper extremity, positive Tinel's at the carpal tunnel, positive Phalen's, decree sensation to light touch in the median nerve distribution.    Radiographs: Three views of the right elbow were obtained and reviewed. These demonstrate able postsurgical changes of distal  biceps button fixation within the proximal radius.      Assessment: 68 year old male s/p Right distal biceps tendon repair, progressing appropriately.  Left carpal tunnel syndrome.    Plan:    - Continue hand therapy per protocol below.  -Follow-up up 12 weeks postop for recheck Dr. Britt.  - Will discuss at his next follow-up preoperative imaging for evaluation of possible reconstruction of the left distal biceps, as well as left carpal tunnel release.  Patient likely like to do these in the same surgical setting, and after Thanksgiving this year.   -Offered corticosteroid injection for the left carpal tunnel today for symptomatic management until surgical release later this year.  Patient agreeable.    Procedure: Left carpal tunnel injection  After informed consent was obtained, the skin was prepped with chloraprep. A mixture of 2 mL of 1% lidocaine and 1 mL (4 mg) of dexamethasone was injected into the patients left tunnel. A bandaid was applied. Post-injection care instructions were given. The patient tolerated the procedure well.         Distal Biceps Tendon Repair Protocol     2 visits per week for first 6 weeks, then once a week for 6 weeks     Weeks 0-2:     Brace/splint use at all times except may remove for exercises and bathing. Splint on at 90 degrees when in public and while sleeping.  Passive ROM only  Full flexion to 30 degrees short of full extension in neutral alignment  With the elbow flexed to 120 degrees, patient can work on active pronation, passive supination  Strict non-weight bearing (no resistance)  Wrist/shoulder ROM exercises unrestricted     Weeks 2-6:     Brace/Splint use at all times except may remove for exercise and bathing. Locked at 90 degrees when in public and while sleeping.  Continue passive ROM  Beginning the third week, patient may gradually progress to full extension by 6 weeks. Continue work on active pronation, passive supination throughout the arc of motion  Goal is for full  extension and near full pronation/supination by 6 weeks postoperatively  Strict non-weight bearing through week 6  Continue shoulder and wrist ROM  Rotator cuff, deltoid isometrics as needed     Weeks 6-12:     Transition out of brace/splint  Progress from full PROM, then AAROM, then AROM as tolerated  Begin active flexion and supination of the elbow  Focus on regaining full active ROM before resistance training  Later in this phase, may begin light (1-2 lbs) resistance training with elbow flexion/supination     Weeks 12+:     Begin gradual elbow flexion/supination strengthening program as tolerated without restrictions.    CODY BOLIVARC  Orthopaedic Surgery

## 2024-04-29 NOTE — NURSING NOTE
Reason For Visit:   Chief Complaint   Patient presents with    RECHECK     Follow-up Post op Right distal biceps repair   DOS: 3/15/24        Primary MD: Clement De Jesus  Ref. MD: Radha    Age: 68 year old    ?  No      There were no vitals taken for this visit.      Pain Assessment  Patient Currently in Pain: No (patient denies any pain in right elbow)    Hand Dominance Evaluation  Hand Dominance: Right          QuickDASH Assessment      4/25/2024     7:15 AM   QuickDASH Main   1. Open a tight or new jar Severe difficulty   2. Do heavy household chores (e.g., wash walls, floors) Severe difficulty   3. Carry a shopping bag or briefcase No difficulty   4. Wash your back Severe difficulty   5. Use a knife to cut food Severe difficulty   6. Recreational activities in which you take some force or impact through your arm, shoulder or hand (e.g., golf, hammering, tennis, etc.) Unable   7. During the past week, to what extent has your arm, shoulder or hand problem interfered with your normal social activities with family, friends, neighbours or groups Extremely   8. During the past week, were you limited in your work or other regular daily activities as a result of your arm, shoulder or hand problem Unable   9. Arm, shoulder or hand pain None   10.Tingling (pins and needles) in your arm,shoulder or hand None   11. During the past week, how much difficulty have you had sleeping because of the pain in your arm, shoulder or hand No difficulty   Quickdash Ability Score 54.55          Current Outpatient Medications   Medication Sig Dispense Refill    acetaminophen (TYLENOL) 500 MG tablet Take 1-2 tablets (500-1,000 mg) by mouth every 8 hours as needed for mild pain 40 tablet 0    albuterol (PROAIR HFA/PROVENTIL HFA/VENTOLIN HFA) 108 (90 Base) MCG/ACT inhaler Inhale 2 puffs into the lungs every 6 hours 8 g 1    amLODIPine (NORVASC) 5 MG tablet TAKE ONE TABLET BY MOUTH ONCE DAILY (Patient taking differently: Take 5 mg  by mouth every morning TAKE ONE TABLET BY MOUTH ONCE DAILY) 90 tablet 1    amoxicillin (AMOXIL) 500 MG capsule Take 4 capsules (2,000 mg) by mouth daily For one dose prior to dental procedures 4 capsule 0    ascorbic acid (VITAMIN C) 500 MG tablet Take 1 tablet by mouth daily. (Patient taking differently: Take 500 mg by mouth every morning) 100 tablet 3    atorvastatin (LIPITOR) 80 MG tablet TAKE ONE TABLET BY MOUTH ONCE DAILY FOR CHOLESTEROL (Patient taking differently: Take 80 mg by mouth every morning TAKE ONE TABLET BY MOUTH ONCE DAILY FOR CHOLESTEROL) 90 tablet 2    buPROPion (WELLBUTRIN SR) 100 MG 12 hr tablet TAKE ONE TABLET BY MOUTH EVERY MORNING (Patient taking differently: 100 mg every morning TAKE ONE TABLET BY MOUTH EVERY MORNING) 90 tablet 0    Cholecalciferol (VITAMIN D3 PO) Take 2,000 Units by mouth every morning      cyclobenzaprine (FLEXERIL) 10 MG tablet Take 1 tablet (10 mg) by mouth nightly as needed for muscle spasms 30 tablet 0    diclofenac (VOLTAREN) 1 % topical gel Apply 4 g topically 4 times daily 150 g 4    finasteride (PROSCAR) 5 MG tablet Take 1 tablet (5 mg) by mouth daily (Patient taking differently: Take 1 tablet by mouth every morning) 90 tablet 3    fish oil-omega-3 fatty acids 500 MG capsule Take by mouth every morning      FLUoxetine (PROZAC) 20 MG capsule TAKE ONE CAPSULE BY MOUTH ONCE DAILY 90 capsule 0    Glucosamine-Chondroit-Vit C-Mn (GLUCOSAMINE-CHONDROITINOITIN) CAPS Take 1 each by mouth daily. (Patient taking differently: Take 1 each by mouth every morning) 90 capsule 3    Multiple vitamin TABS Take 1 tablet by mouth daily. (Patient taking differently: Take 1 tablet by mouth every morning) 90 tablet 3    Naproxen Sodium (ALEVE PO) Take 220 mg by mouth every morning      omeprazole (PRILOSEC) 20 MG DR capsule TAKE ONE CAPSULE BY MOUTH EVERY OTHER DAY 30-60 MINUTES BEFORE A MEAL FOR HEARTBURN (Patient taking differently: 20 mg every other day TAKE ONE CAPSULE BY MOUTH EVERY  OTHER DAY 30-60 MINUTES BEFORE A MEAL FOR HEARTBURN) 45 capsule 3    tadalafil (CIALIS) 5 MG tablet Take 1 tablet (5 mg) by mouth daily (Patient taking differently: Take 5 mg by mouth every morning) 90 tablet 3    tamsulosin (FLOMAX) 0.4 MG capsule Take 1 capsule (0.4 mg) by mouth daily (Patient taking differently: Take 0.4 mg by mouth every morning) 90 capsule 3       No Known Allergies    MALINI BEY, ATC

## 2024-05-01 ENCOUNTER — THERAPY VISIT (OUTPATIENT)
Dept: OCCUPATIONAL THERAPY | Facility: CLINIC | Age: 69
End: 2024-05-01
Payer: COMMERCIAL

## 2024-05-01 DIAGNOSIS — M25.521 RIGHT ELBOW PAIN: Primary | ICD-10-CM

## 2024-05-01 DIAGNOSIS — Z47.89 AFTERCARE FOLLOWING SURGERY OF THE MUSCULOSKELETAL SYSTEM, NEC: ICD-10-CM

## 2024-05-01 PROCEDURE — 97140 MANUAL THERAPY 1/> REGIONS: CPT | Mod: GO | Performed by: OCCUPATIONAL THERAPIST

## 2024-05-01 PROCEDURE — 97110 THERAPEUTIC EXERCISES: CPT | Mod: GO | Performed by: OCCUPATIONAL THERAPIST

## 2024-05-07 ENCOUNTER — VIRTUAL VISIT (OUTPATIENT)
Dept: FAMILY MEDICINE | Facility: CLINIC | Age: 69
End: 2024-05-07
Payer: COMMERCIAL

## 2024-05-07 DIAGNOSIS — R39.198 SLOW URINARY STREAM: ICD-10-CM

## 2024-05-07 DIAGNOSIS — F33.1 MAJOR DEPRESSIVE DISORDER, RECURRENT EPISODE, MODERATE (H): ICD-10-CM

## 2024-05-07 DIAGNOSIS — E78.5 HYPERLIPIDEMIA LDL GOAL <130: ICD-10-CM

## 2024-05-07 DIAGNOSIS — K21.9 GASTROESOPHAGEAL REFLUX DISEASE WITHOUT ESOPHAGITIS: ICD-10-CM

## 2024-05-07 DIAGNOSIS — I10 HYPERTENSION GOAL BP (BLOOD PRESSURE) < 140/90: ICD-10-CM

## 2024-05-07 PROCEDURE — 96127 BRIEF EMOTIONAL/BEHAV ASSMT: CPT | Mod: 95 | Performed by: FAMILY MEDICINE

## 2024-05-07 PROCEDURE — 99214 OFFICE O/P EST MOD 30 MIN: CPT | Mod: 95 | Performed by: FAMILY MEDICINE

## 2024-05-07 RX ORDER — AMLODIPINE BESYLATE 5 MG/1
TABLET ORAL
Qty: 90 TABLET | Refills: 3 | Status: SHIPPED | OUTPATIENT
Start: 2024-05-07

## 2024-05-07 RX ORDER — ATORVASTATIN CALCIUM 80 MG/1
80 TABLET, FILM COATED ORAL EVERY MORNING
Qty: 90 TABLET | Refills: 3 | Status: SHIPPED | OUTPATIENT
Start: 2024-05-07

## 2024-05-07 RX ORDER — BUPROPION HYDROCHLORIDE 100 MG/1
TABLET, EXTENDED RELEASE ORAL
Qty: 90 TABLET | Refills: 1 | Status: SHIPPED | OUTPATIENT
Start: 2024-05-07 | End: 2024-09-06

## 2024-05-07 ASSESSMENT — PATIENT HEALTH QUESTIONNAIRE - PHQ9
SUM OF ALL RESPONSES TO PHQ QUESTIONS 1-9: 0
SUM OF ALL RESPONSES TO PHQ QUESTIONS 1-9: 0

## 2024-05-07 NOTE — PROGRESS NOTES
"    Instructions Relayed to Patient by Virtual Roomer:     Patient is active on Bloc:   Relayed following to patient: \"It looks like you are active on [employer]oc, are you able to join the visit this way? If not, do you need us to send you a link now or would you like your provider to send a link via text or email when they are ready to initiate the visit?\"      Patient Confirmed they will join visit via: [employer]  Reminded patient to ensure they were logged on to virtual visit by arrival time listed.   Asked if patient has flexibility to initiate visit sooner than arrival time: patient stated yes, documented in appointment notes availability to initiate visit earlier than arrival time     If pediatric virtual visit, ensured pediatric patient along with parent/guardian will be present for video visit.     Patient offered the website [email or web address] and/or phone number to Bloc Help line: [phone]      Answers submitted by the patient for this visit:  Patient Health Questionnaire (Submitted on 5/7/2024)  PHQ9 TOTAL SCORE: 0  General Questionnaire (Submitted on 5/7/2024)  Chief Complaint: Chronic problems general questions HPI Form  What is the reason for your visit today? : HEALTH FOLLOW UP  How many servings of fruits and vegetables do you eat daily?: 0-1  On average, how many sweetened beverages do you drink each day (Examples: soda, juice, sweet tea, etc.  Do NOT count diet or artificially sweetened beverages)?: 0  How many minutes a day do you exercise enough to make your heart beat faster?: 10 to 19  How many days a week do you exercise enough to make your heart beat faster?: 3 or less  How many days per week do you miss taking your medication?: 0  Travon is a 68 year old who is being evaluated via a billable video visit.    How would you like to obtain your AVS? [employer]  If the video visit is dropped, the invitation should be resent by: Text to cell phone: [phone]  Will anyone else be " joining your video visit? No      ASSESSMENT / PLAN:  (I10) Hypertension goal BP (blood pressure) < 140/90  Comment: stabe  Plan: amLODIPine (NORVASC) 5 MG tablet        Continue exercise and self-monitor.Recheck in 6 months  Sooner if worse. Chest pain or shortness of breath to er.    (E78.5) Hyperlipidemia LDL goal <130  Comment: stable in past  Plan: atorvastatin (LIPITOR) 80 MG tablet        Recheck in 6 months  With wellness exam    (F33.1) Major depressive disorder, recurrent episode, moderate (H)  Comment: stable  Plan: buPROPion (WELLBUTRIN SR) 100 MG 12 hr tablet,         FLUoxetine (PROZAC) 20 MG capsule        Exercise. If SUICIAL IDEATION OR HOMOCIDAL IDEATION OR YAKOV TO ER     (R39.198) Slow urinary stream  Comment: stable  Plan: urology if worse    (K21.9) Gastroesophageal reflux disease without esophagitis  Comment: stable.  Plan: egd if worse. Recheck in 6 months        Subjective   Travon is a 68 year old, presenting for the following health issues:  History htn, bph, depression, high cholesterol, morbid obesity, ed and gerd.   Outside blood pressure reading - needs more but ok. Doing more outside time.   No chest pain or shortness of breath. No acute urine changes - stable. No dysuria or hematuria.  No winter vacation. Taking vitaminD. Gerd stable.   Emotionally doing ok and looking forward to summer. Sleep overall ok.     No chief complaint on file.      Video Start Time: 9:00 AM    History of Present Illness       Reason for visit:  HEALTH FOLLOW UP    He eats 0-1 servings of fruits and vegetables daily.He consumes 0 sweetened beverage(s) daily.He exercises with enough effort to increase his heart rate 10 to 19 minutes per day.  He exercises with enough effort to increase his heart rate 3 or less days per week.   He is taking medications regularly.           Objective           Vitals:  No vitals were obtained today due to virtual visit.    Physical Exam   GENERAL: alert and no distress  EYES: Eyes  grossly normal to inspection.  No discharge or erythema, or obvious scleral/conjunctival abnormalities.  RESP: No audible wheeze, cough, or visible cyanosis.    SKIN: Visible skin clear. No significant rash, abnormal pigmentation or lesions.  NEURO: Cranial nerves grossly intact.  Mentation and speech appropriate for age.  PSYCH: Appropriate affect, tone, and pace of words          Video-Visit Details    Type of service:  Video Visit   Video End Time:9:13 AM  Originating Location (pt. Location): Home    Distant Location (provider location):  On-site  Platform used for Video Visit: Sarahi  Signed Electronically by: Clement De Jesus MD

## 2024-05-21 ENCOUNTER — THERAPY VISIT (OUTPATIENT)
Dept: OCCUPATIONAL THERAPY | Facility: CLINIC | Age: 69
End: 2024-05-21
Payer: COMMERCIAL

## 2024-05-21 DIAGNOSIS — Z47.89 AFTERCARE FOLLOWING SURGERY OF THE MUSCULOSKELETAL SYSTEM, NEC: Primary | ICD-10-CM

## 2024-05-21 DIAGNOSIS — S46.219A BICEPS TENDON TEAR: ICD-10-CM

## 2024-05-21 PROCEDURE — 97110 THERAPEUTIC EXERCISES: CPT | Mod: GO | Performed by: OCCUPATIONAL THERAPIST

## 2024-06-11 ENCOUNTER — OFFICE VISIT (OUTPATIENT)
Dept: ORTHOPEDICS | Facility: CLINIC | Age: 69
End: 2024-06-11
Payer: COMMERCIAL

## 2024-06-11 VITALS
DIASTOLIC BLOOD PRESSURE: 88 MMHG | WEIGHT: 275 LBS | HEIGHT: 70 IN | BODY MASS INDEX: 39.37 KG/M2 | SYSTOLIC BLOOD PRESSURE: 157 MMHG

## 2024-06-11 DIAGNOSIS — S46.212S RUPTURE OF LEFT DISTAL BICEPS TENDON, SEQUELA: Primary | ICD-10-CM

## 2024-06-11 PROCEDURE — 99024 POSTOP FOLLOW-UP VISIT: CPT | Performed by: STUDENT IN AN ORGANIZED HEALTH CARE EDUCATION/TRAINING PROGRAM

## 2024-06-11 NOTE — LETTER
6/11/2024      Tad Olivas  [address] [address]      Dear Colleague,    Thank you for referring your patient, Tad Olivas, to the [facility] [place]. Please see a copy of my visit note below.    Date of Service: Jun 11, 2024    Chief Complaint: Post operative follow up.     Date of Surgery: 3/15/24    Procedure Performed: Right distal biceps tendon repair      Interval events: Tad Olivas is a 68 year old male who presents today for a postoperative follow up.  He is doing well.  Pain is well-controlled.  Denies numbness or tingling.  He is now 3 months postop.  He has had excellent return of motion.  He has not yet progressed weightbearing or strengthening.    Patient again brings up investigating the left elbow to see if anything can be done.  While he has adapted to the deficit, he remains weak and he does notice it and finds it bothersome.  He previously discussed carpal tunnel syndrome with Liza Wnag PA-C.  He did not report this today.  However previously, he reported that carpal tunnel symptoms of the left hand have become more bothersome since recovering from his right upper extremity surgery.  He notes numbness and tingling of the thumb, index, middle finger.  This is worse at night or when holding a book or driving.  He had a steroid injection at his last visit.    The past medical history was reviewed updated in the EMR. This includes medications, surgeries, social history, and review of systems.    Physical examination:  Well-developed, well-nourished and in no acute distress.  Alert and oriented to surroundings.  On examination of the  right upper extremity, incision is well-healed. There is no erythema, drainage, or dehiscence.  No swelling.  No ecchymoses.  Sensation and motor is intact in median, radial and ulnar nerve distributions. Patient can actively flex and extend all digits and thumb.  Palpable robust distal biceps tendon in  the antecubital fossa.  Excellent 5 -/5 strength in supination, 5/5 strength in biceps flexion.  Fingers are warm and well-perfused. Radial pulse is palpable.  Full flexion extension of the elbow.    On examination of the left upper extremity, positive Tinel's at the carpal tunnel, positive Phalen's, decreased sensation to light touch in the median nerve distribution.  Weakness in supination    Radiographs: Three views of the right elbow were obtained previously and reviewed. These demonstrate able postsurgical changes of distal biceps button fixation within the proximal radius.      Assessment: 68 year old male s/p Right distal biceps tendon repair, progressing appropriately.  Left carpal tunnel syndrome.  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    Plan:    -Patient has had excellent return of motion with already excellent strength  -Begin progressive strengthening and weightbearing with hand therapy.  -Advised no sports such as golf or riding his motorcycle for an additional 6 weeks until right upper extremity improves.  -MRI of left elbow ordered to evaluate if distal biceps tendon reconstruction is a possibility.    Telephone or in person visit with me after MRI to discuss results.    Distal Biceps Tendon Repair Protocol     2 visits per week for first 6 weeks, then once a week for 6 weeks     Weeks 0-2:     Brace/splint use at all times except may remove for exercises and bathing. Splint on at 90 degrees when in public and while sleeping.  Passive ROM only  Full flexion to 30 degrees short of full extension in neutral alignment  With the elbow flexed to 120 degrees, patient can work on active pronation, passive supination  Strict non-weight bearing (no resistance)  Wrist/shoulder ROM exercises unrestricted     Weeks 2-6:     Brace/Splint use at all times except may remove for exercise and bathing. Locked at 90 degrees when in public and while sleeping.  Continue  passive ROM  Beginning the third week, patient may gradually progress to full extension by 6 weeks. Continue work on active pronation, passive supination throughout the arc of motion  Goal is for full extension and near full pronation/supination by 6 weeks postoperatively  Strict non-weight bearing through week 6  Continue shoulder and wrist ROM  Rotator cuff, deltoid isometrics as needed     Weeks 6-12:     Transition out of brace/splint  Progress from full PROM, then AAROM, then AROM as tolerated  Begin active flexion and supination of the elbow  Focus on regaining full active ROM before resistance training  Later in this phase, may begin light (1-2 lbs) resistance training with elbow flexion/supination     Weeks 12+:     Begin gradual elbow flexion/supination strengthening program as tolerated without restrictions.    Jose Britt MD    Hand & Upper Extremity Surgery  Department of Orthopedic Surgery  [facility]      Again, thank you for allowing me to participate in the care of your patient.        Sincerely,        Jose Britt MD

## 2024-06-11 NOTE — PROGRESS NOTES
Date of Service: Jun 11, 2024    Chief Complaint: Post operative follow up.     Date of Surgery: 3/15/24    Procedure Performed: Right distal biceps tendon repair      Interval events: Tad Olivas is a 68 year old male who presents today for a postoperative follow up.  He is doing well.  Pain is well-controlled.  Denies numbness or tingling.  He is now 3 months postop.  He has had excellent return of motion.  He has not yet progressed weightbearing or strengthening.    Patient again brings up investigating the left elbow to see if anything can be done.  While he has adapted to the deficit, he remains weak and he does notice it and finds it bothersome.  He previously discussed carpal tunnel syndrome with Liza Wang PA-C.  He did not report this today.  However previously, he reported that carpal tunnel symptoms of the left hand have become more bothersome since recovering from his right upper extremity surgery.  He notes numbness and tingling of the thumb, index, middle finger.  This is worse at night or when holding a book or driving.  He had a steroid injection at his last visit.    The past medical history was reviewed updated in the EMR. This includes medications, surgeries, social history, and review of systems.    Physical examination:  Well-developed, well-nourished and in no acute distress.  Alert and oriented to surroundings.  On examination of the  right upper extremity, incision is well-healed. There is no erythema, drainage, or dehiscence.  No swelling.  No ecchymoses.  Sensation and motor is intact in median, radial and ulnar nerve distributions. Patient can actively flex and extend all digits and thumb.  Palpable robust distal biceps tendon in the antecubital fossa.  Excellent 5 -/5 strength in supination, 5/5 strength in biceps flexion.  Fingers are warm and well-perfused. Radial pulse is palpable.  Full flexion extension of the elbow.    On examination of the left upper extremity, positive  Tinel's at the carpal tunnel, positive Phalen's, decreased sensation to light touch in the median nerve distribution.  Weakness in supination    Radiographs: Three views of the right elbow were obtained previously and reviewed. These demonstrate able postsurgical changes of distal biceps button fixation within the proximal radius.      Assessment: 68 year old male s/p Right distal biceps tendon repair, progressing appropriately.  Left carpal tunnel syndrome.  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    Plan:    -Patient has had excellent return of motion with already excellent strength  -Begin progressive strengthening and weightbearing with hand therapy.  -Advised no sports such as golf or riding his motorcycle for an additional 6 weeks until right upper extremity improves.  -MRI of left elbow ordered to evaluate if distal biceps tendon reconstruction is a possibility.    Telephone or in person visit with me after MRI to discuss results.    Distal Biceps Tendon Repair Protocol     2 visits per week for first 6 weeks, then once a week for 6 weeks     Weeks 0-2:     Brace/splint use at all times except may remove for exercises and bathing. Splint on at 90 degrees when in public and while sleeping.  Passive ROM only  Full flexion to 30 degrees short of full extension in neutral alignment  With the elbow flexed to 120 degrees, patient can work on active pronation, passive supination  Strict non-weight bearing (no resistance)  Wrist/shoulder ROM exercises unrestricted     Weeks 2-6:     Brace/Splint use at all times except may remove for exercise and bathing. Locked at 90 degrees when in public and while sleeping.  Continue passive ROM  Beginning the third week, patient may gradually progress to full extension by 6 weeks. Continue work on active pronation, passive supination throughout the arc of motion  Goal is for full extension and near full pronation/supination by 6 weeks  postoperatively  Strict non-weight bearing through week 6  Continue shoulder and wrist ROM  Rotator cuff, deltoid isometrics as needed     Weeks 6-12:     Transition out of brace/splint  Progress from full PROM, then AAROM, then AROM as tolerated  Begin active flexion and supination of the elbow  Focus on regaining full active ROM before resistance training  Later in this phase, may begin light (1-2 lbs) resistance training with elbow flexion/supination     Weeks 12+:     Begin gradual elbow flexion/supination strengthening program as tolerated without restrictions.    Jose Britt MD    Hand & Upper Extremity Surgery  Department of Orthopedic Surgery  [facility]

## 2024-06-18 ENCOUNTER — THERAPY VISIT (OUTPATIENT)
Dept: OCCUPATIONAL THERAPY | Facility: CLINIC | Age: 69
End: 2024-06-18
Payer: COMMERCIAL

## 2024-06-18 DIAGNOSIS — Z47.89 AFTERCARE FOLLOWING SURGERY OF THE MUSCULOSKELETAL SYSTEM, NEC: Primary | ICD-10-CM

## 2024-06-18 PROCEDURE — 97110 THERAPEUTIC EXERCISES: CPT | Mod: GO | Performed by: OCCUPATIONAL THERAPIST

## 2024-06-21 ENCOUNTER — MYC MEDICAL ADVICE (OUTPATIENT)
Dept: UROLOGY | Facility: CLINIC | Age: 69
End: 2024-06-21
Payer: COMMERCIAL

## 2024-06-21 NOTE — TELEPHONE ENCOUNTER
Left Voicemail (1st Attempt) and Sent Mychart (1st Attempt) for the patient to call back and schedule the following:    Appointment type: Return   Provider: Reschedule to Romi Pierce  Return date: ~Jan 14th 2025  Specialty phone number: [phone]  Additional appointment(s) needed: N/A  Additonal Notes: Janet Farriscastillomaureen will be leaving the clinic, reschedule to Romi Pierce

## 2024-07-05 ENCOUNTER — MYC MEDICAL ADVICE (OUTPATIENT)
Dept: ORTHOPEDICS | Facility: CLINIC | Age: 69
End: 2024-07-05
Payer: COMMERCIAL

## 2024-07-05 DIAGNOSIS — F41.9 ANXIETY: Primary | ICD-10-CM

## 2024-07-05 NOTE — TELEPHONE ENCOUNTER
Patient last seen 6/11/24 for rupture of left distal biceps tendon. He was referred for left elbow MRI, which he has scheduled for 7/9/24.     Patient requesting Valium for MRI. Please advise.     Ilene Curiel MSA, ATC  Certified Athletic Trainer

## 2024-07-08 RX ORDER — DIAZEPAM 5 MG
TABLET ORAL
Qty: 1 TABLET | Refills: 0 | Status: SHIPPED | OUTPATIENT
Start: 2024-07-08

## 2024-07-08 NOTE — TELEPHONE ENCOUNTER
Valium prescribed,  Take 60 min before MRI, recommend he have a .    Jorge Knox PA-C  [facility] Orth[facility]

## 2024-07-09 ENCOUNTER — ANCILLARY PROCEDURE (OUTPATIENT)
Dept: MRI IMAGING | Facility: CLINIC | Age: 69
End: 2024-07-09
Attending: STUDENT IN AN ORGANIZED HEALTH CARE EDUCATION/TRAINING PROGRAM
Payer: COMMERCIAL

## 2024-07-09 DIAGNOSIS — S46.212S RUPTURE OF LEFT DISTAL BICEPS TENDON, SEQUELA: ICD-10-CM

## 2024-07-09 PROCEDURE — 73221 MRI JOINT UPR EXTREM W/O DYE: CPT | Mod: LT | Performed by: RADIOLOGY

## 2024-07-10 ENCOUNTER — MYC MEDICAL ADVICE (OUTPATIENT)
Dept: ORTHOPEDICS | Facility: CLINIC | Age: 69
End: 2024-07-10
Payer: COMMERCIAL

## 2024-07-10 NOTE — TELEPHONE ENCOUNTER
Please see Medipacs message regarding right arm/shoulder numbness and weakness after MRI on 7/9/24 and advise if patient should be seen tomorrow if not improving.     LISA Vee RN

## 2024-07-10 NOTE — TELEPHONE ENCOUNTER
Please see Mychart request to use Diclofenac topical gel on the shoulder and advise.     LISA Vee RN

## 2024-07-12 LAB — RADIOLOGIST FLAGS: NORMAL

## 2024-07-15 ENCOUNTER — OFFICE VISIT (OUTPATIENT)
Dept: ORTHOPEDICS | Facility: CLINIC | Age: 69
End: 2024-07-15
Payer: COMMERCIAL

## 2024-07-15 VITALS
DIASTOLIC BLOOD PRESSURE: 87 MMHG | SYSTOLIC BLOOD PRESSURE: 168 MMHG | WEIGHT: 239 LBS | BODY MASS INDEX: 34.22 KG/M2 | HEIGHT: 70 IN

## 2024-07-15 DIAGNOSIS — S44.91XA NEUROPRAXIA OF RIGHT UPPER EXTREMITY, INITIAL ENCOUNTER: Primary | ICD-10-CM

## 2024-07-15 DIAGNOSIS — G56.02 CARPAL TUNNEL SYNDROME OF LEFT WRIST: ICD-10-CM

## 2024-07-15 PROCEDURE — 99203 OFFICE O/P NEW LOW 30 MIN: CPT | Performed by: PHYSICIAN ASSISTANT

## 2024-07-15 NOTE — PATIENT INSTRUCTIONS
Hi Travon,    I recommend you continue to advance activities as tolerated.    I will discuss the MRI results and your carpal tunnel with DR. RUTHERFORD.    YARED [facility]

## 2024-07-15 NOTE — Clinical Note
"7/15/2024      Tad Olivas  [address] [address]      Dear Colleague,    Thank you for referring your patient, Tad Olivas, to the [facility] [place]. Please see a copy of my visit note below.    HISTORY OF PRESENT ILLNESS:    Tad Olivas is a 68 year old male who is seen in follow up for numbness in the right shoulder. Patient is 4 months status post Right distal biceps tendon repair. Date of Surgery: 3/15/24     Present symptoms: Patient reports loss of ROM, as well as numbness in the shoulder that began one week ago when he went for an MRI and was asked to lay with both arms above his head. Has been taking ibuprofen for pain, also using Voltaren gel, aleve 2 in the morning and 2 at night, has helped a bit.   Denies Chest pain, Calve pain, Fever, Chills.    Current Treatment: ***    PHYSICAL EXAM:  There were no vitals taken for this visit.  There is no height or weight on file to calculate BMI.   GENERAL APPEARANCE: {BERLIN GENERAL APPEARANCE:50::\"healthy\",\"alert\",\"no distress\"}   PSYCH:  {BERLIN EXAM CPE - PSYCH:986887::\"mentation appears normal\",\"affect normal/bright\"}    MSK:  {RIGHT:009818} .  Ambulates: ***.  Incision clean and dry, *** present, healing.  Appropriate incisional erythema.   {YES/NO -default:407943} Ecchymosis ***.  {YES/NO -default:568670} calve pain on palpation.  Edema ***  CMS: raji incisional numbness, otherwise grossly intact.  AROM ***.      IMAGING INTERPRETATION:  ***.  Images read and interpreted by me***     ASSESSMENT:  Tad Olivas is a 68 year old male S/P ***.  ***    PLAN:  - Surgery discussed, images reviewed if applicable, and all questions were answered at this time.  - *** removed with sterile technique, steri-strips applied in usual fashion, care instructions given and verbally acknowledged.  - Medications: ***  - {REHAB :931183}  - ***    Return to clinic {RTC WHEN:457576}    Jorge Knox PA-C    Dept. " Orthopedic Surgery  [facility]   7/15/2024       Again, thank you for allowing me to participate in the care of your patient.        Sincerely,        Jorge Knox PA-C

## 2024-07-15 NOTE — PROGRESS NOTES
"HISTORY OF PRESENT ILLNESS:    Tad Olivas is a 68 year old male who is seen in follow up for numbness in the right shoulder. Patient is 4 months status post Right distal biceps tendon repair. Date of Surgery: 3/15/24     Present symptoms: Patient reports loss of ROM, as well as numbness in the shoulder that began one week ago when he went for an MRI of the left elbow, and was asked to lay with both arms above his head. Has been taking ibuprofen for pain, also using Voltaren gel, aleve 2 in the morning and 2 at night, has helped a bit.   Overall he feels the ROM is back to normal and the numbness is improving.  Also notes had work up for left CTS with EMG which I reviews as \"severe left median nerve at the wrist\".  Wondering about treatment as he delayed due to biceps injuries and surgery.    PHYSICAL EXAM:  BP (!) 168/87   Ht 1.778 m (5' 10\")   Wt 108.4 kg (239 lb)   BMI 34.29 kg/m    Body mass index is 34.29 kg/m .   GENERAL APPEARANCE: healthy, alert, and no distress   PSYCH:  mentation appears normal and affect normal/bright    MSK:  Right: Shoulder .  No visible posturing, lesions or guarding.  AROM normal and symmetric.  Strength all planes symmetric.  Palpation:  mild decrease in subjective light touch at posterior lateral shoulder to upper triceps when compared with left.  Circulation intact.       ASSESSMENT:  Tad Olivas is a 68 year old male :    Right shoulder impingement or neuropraxia.  Improving.    PLAN:  - recommend he return to activities as tolerated.  - will discuss MRI results and Left CTR with DR Britt.    A total of 20 minutes spent either with patient, on care and care coordinating activities or records and image review if available.    Jorge Knox PA-C    Dept. Orthopedic Surgery  [facility]   7/15/2024   "

## 2024-07-29 ENCOUNTER — MYC MEDICAL ADVICE (OUTPATIENT)
Dept: ORTHOPEDICS | Facility: CLINIC | Age: 69
End: 2024-07-29

## 2024-07-29 ENCOUNTER — THERAPY VISIT (OUTPATIENT)
Dept: OCCUPATIONAL THERAPY | Facility: CLINIC | Age: 69
End: 2024-07-29
Payer: COMMERCIAL

## 2024-07-29 DIAGNOSIS — Z47.89 AFTERCARE FOLLOWING SURGERY OF THE MUSCULOSKELETAL SYSTEM, NEC: Primary | ICD-10-CM

## 2024-07-29 PROCEDURE — 97112 NEUROMUSCULAR REEDUCATION: CPT | Mod: GO | Performed by: OCCUPATIONAL THERAPIST

## 2024-07-29 PROCEDURE — 97110 THERAPEUTIC EXERCISES: CPT | Mod: GO | Performed by: OCCUPATIONAL THERAPIST

## 2024-07-29 NOTE — TELEPHONE ENCOUNTER
Please see Mychart update and advise if patient should be seen sooner than 8/27/24 or if anything different is recommended.     LISA Vee RN

## 2024-08-05 ENCOUNTER — THERAPY VISIT (OUTPATIENT)
Dept: OCCUPATIONAL THERAPY | Facility: CLINIC | Age: 69
End: 2024-08-05
Payer: COMMERCIAL

## 2024-08-05 DIAGNOSIS — Z47.89 AFTERCARE FOLLOWING SURGERY OF THE MUSCULOSKELETAL SYSTEM, NEC: ICD-10-CM

## 2024-08-05 DIAGNOSIS — M25.521 RIGHT ELBOW PAIN: Primary | ICD-10-CM

## 2024-08-05 PROCEDURE — 97110 THERAPEUTIC EXERCISES: CPT | Mod: GO | Performed by: OCCUPATIONAL THERAPIST

## 2024-08-05 PROCEDURE — 97112 NEUROMUSCULAR REEDUCATION: CPT | Mod: GO | Performed by: OCCUPATIONAL THERAPIST

## 2024-08-05 NOTE — PROGRESS NOTES
OCCUPATIONAL THERAPY Progress Note  Re CERt  Type of Visit: Evaluation    Subjective It is getting better      Presenting condition or subjective complaint:  s/p R distal biceps repair     Objective     Right hand dominant  Patient reports symptoms of pain, stiffness/loss of motion, and weakness/loss of strength    Pain Level (Scale 0-10)   3/22/2024 6/18/2024   At Rest 0 0   With Use N/A 0     Posture  Rounded Forward Shoulders    Sensation  WNL throughout all nerve distributions; per patient report    Edema: 37cm at elbow crease    ROM  Pain Report: - none  + mild    ++ moderate    +++ severe   Elbow 3/22/2024 3/22/2024 6/18/2024   AROM (PROM) L R R   Extension 0 (-38) 0   Flexion 136 (130) 155   Supination 45 (hx injury) (65) 70   Pronation 90 (45) 85     Wrist 3/22/2024 3/22/2024 6/18/2024   AROM (PROM) L R R    Extension 60 46 60   Flexion 45 60 60   RD      UD          Strength  -contraindicated          Assessment & Plan   CLINICAL IMPRESSIONS  Medical Diagnosis:   Biceps Repair   Treatment Diagnosis:    Biceps Repair   Impression/Assessment: Pt is a 68 year old male presenting to Occupational Therapy due to s/p R distal biceps repair seen for 10 visits  with returning AROM  and function.   Plan at this time to progress to graded strengthening program.   Long Term Goals    Pt will be able to use arm for moderately resistive tasks such as lifting laundry without restriction.       Frequency of Treatment:   1x per 2 weeks  Duration of Treatment:    3 months    Education Assessment:   No barriers to learning, written, verbal, demonstrated instruction      Risks and benefits of evaluation/treatment have been explained.   Patient/Family/caregiver agrees with Plan of Care.     Signing Clinician: Wanda Traore EdD, OTR/L, CHT      [facility]                                                                                   OUTPATIENT OCCUPATIONAL THERAPY      PLAN OF TREATMENT FOR  OUTPATIENT REHABILITATION   Patient's Last Name, First Name, [email or web address]  Tad Olivas YOB: 1955   Provider's Name   [facility]   Medical Record No.  [id number]     Onset Date:  3/15/2024 Start of Care Date:  3/22/2024     Medical Diagnosis:   Biceps Repair      OT Treatment Diagnosis:   Biceps Repair  Plan of Treatment  Frequency/Duration: / 2x per month     Certification date from  6/18/2024    To     9/18/2024     See note for plan of treatment details and       Wanda Traore                     I CERTIFY THE NEED FOR THESE SERVICES FURNISHED UNDER        THIS PLAN OF TREATMENT AND WHILE UNDER MY CARE     (Physician attestation of this document indicates review and certification of the therapy plan).              Referring Provider:  Liza Wang    Initial Assessment  See Epic Evaluation-

## 2024-08-22 ENCOUNTER — MYC MEDICAL ADVICE (OUTPATIENT)
Dept: FAMILY MEDICINE | Facility: CLINIC | Age: 69
End: 2024-08-22
Payer: COMMERCIAL

## 2024-08-22 ENCOUNTER — MYC MEDICAL ADVICE (OUTPATIENT)
Dept: ORTHOPEDICS | Facility: CLINIC | Age: 69
End: 2024-08-22
Payer: COMMERCIAL

## 2024-08-23 ENCOUNTER — OFFICE VISIT (OUTPATIENT)
Dept: FAMILY MEDICINE | Facility: CLINIC | Age: 69
End: 2024-08-23
Payer: COMMERCIAL

## 2024-08-23 ENCOUNTER — NURSE TRIAGE (OUTPATIENT)
Dept: NURSING | Facility: CLINIC | Age: 69
End: 2024-08-23

## 2024-08-23 VITALS
OXYGEN SATURATION: 97 % | HEART RATE: 78 BPM | BODY MASS INDEX: 40.52 KG/M2 | RESPIRATION RATE: 20 BRPM | HEIGHT: 70 IN | WEIGHT: 283 LBS | DIASTOLIC BLOOD PRESSURE: 96 MMHG | TEMPERATURE: 96.8 F | SYSTOLIC BLOOD PRESSURE: 158 MMHG

## 2024-08-23 DIAGNOSIS — M54.42 CHRONIC BILATERAL LOW BACK PAIN WITH BILATERAL SCIATICA: Primary | ICD-10-CM

## 2024-08-23 DIAGNOSIS — G89.29 CHRONIC BILATERAL LOW BACK PAIN WITH BILATERAL SCIATICA: Primary | ICD-10-CM

## 2024-08-23 DIAGNOSIS — M54.41 CHRONIC BILATERAL LOW BACK PAIN WITH BILATERAL SCIATICA: Primary | ICD-10-CM

## 2024-08-23 PROCEDURE — 99214 OFFICE O/P EST MOD 30 MIN: CPT | Performed by: PHYSICIAN ASSISTANT

## 2024-08-23 ASSESSMENT — PAIN SCALES - GENERAL: PAINLEVEL: SEVERE PAIN (6)

## 2024-08-23 NOTE — TELEPHONE ENCOUNTER
Pt calling with concerns about;    Trouble with sciatica - he thinks for last 2 weeks 7/10 mostly in left hip area  Was doing ice and heat and taking alleve since onset   now also on right side  Seems to be getting worse and alleve not helping at all  Was carrying something last PM and had sudden onset of bilateral sharp shooting pain that went all the way down legs to feet - mostly on left side but some on right side/leg as well.  Has an old water bed and having a difficult time getting    Denies;  Unable to stand/walk  Excruciating pain  Any recent or specific injury  Numbness/weakness of leg/foot    According to the protocol, patient should see a HCP within 4 hours (or 2LT).  Care advice given. Patient verbalizes understanding and agrees with plan of care. Transferred to scheduling.     Etelvina Reinoso RN, Nurse Advisor 6:28 AM 8/23/2024  Reason for Disposition   [1] Pain radiates into the thigh or further down the leg AND [2] both legs    Additional Information   Negative: Passed out (i.e., lost consciousness, collapsed and was not responding)   Negative: Shock suspected (e.g., cold/pale/clammy skin, too weak to stand, low BP, rapid pulse)   Negative: Sounds like a life-threatening emergency to the triager   Negative: Major injury to the back (e.g., MVA, fall > 10 feet or 3 meters, penetrating injury, etc.)   Negative: Followed a tailbone injury   Negative: [1] Pain in the upper back over the ribs (rib cage) AND [2] radiates (travels, goes) into chest   Negative: [1] Pain in the upper back over the ribs (rib cage) AND [2] worsened by coughing (or clearly increases with breathing)   Negative: Back pain during pregnancy   Negative: Pain mainly in flank (i.e., in the side, over the lower ribs or just below the ribs)   Negative: [1] SEVERE back pain (e.g., excruciating) AND [2] sudden onset AND [3] age > 60 years   Negative: [1] Unable to urinate (or only a few drops) > 4 hours AND [2] bladder feels very full (e.g.,  palpable bladder or strong urge to urinate)   Negative: [1] Loss of bladder or bowel control (urine or bowel incontinence; wetting self, leaking stool) AND [2] new-onset   Negative: Numbness in groin or rectal area (i.e., loss of sensation)   Negative: [1] SEVERE abdominal pain AND [2] present > 1 hour   Negative: [1] Abdominal pain AND [2] age > 60 years   Negative: Weakness of a leg or foot (e.g., unable to bear weight, dragging foot)   Negative: Unable to walk   Negative: Patient sounds very sick or weak to the triager   Negative: [1] SEVERE back pain (e.g., excruciating, unable to do any normal activities) AND [2] not improved 2 hours after pain medicine    Protocols used: Back Pain-A-AH

## 2024-08-23 NOTE — PROGRESS NOTES
"  Assessment & Plan     Chronic bilateral low back pain with bilateral sciatica  - Pain Management  Referral; Future  - Physical Therapy  Referral; Future  PCP Dr De Jesus  Sent referral for pain management to evaluate if injection might be effective for him.   Since I am not his PCP, however, I will not commit to accepting responsibility for Dr De Jesus nor for myself in continuing any narcotic medications, If PM opts not to see patient due to this, patient will need to see his PCP.     Also sent referral to PT. 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 Gave patient some exercises to do for stretching muscles involved in sciatic nerve pain (which follows L5/S1 dermatome).    31 minutes spent face to face in evaluation or patient, reviewing chart/previous imaging, developing plan of care and documentation.      BMI  Estimated body mass index is 40.61 kg/m  as calculated from the following:    Height as of this encounter: 1.778 m (5' 10\").    Weight as of this encounter: 128.4 kg (283 lb).       IMPRESSION:      THORACIC: Subtle height loss of multiple midthoracic vertebral bodies, presumably chronic. Otherwise, no fracture identified. Moderate degenerative change.      LUMBAR: No fracture is identified. Moderate to severe disc height loss at L5-S1. Background of mild disc height loss. Severe lower lumbar spine facet arthropathy. Multilevel grade 1 spondylolisthesis, worst at L4-L5. FEB 2024    Subjective   Travon is a 68 year old, presenting for the following health issues:  Pain (Sx pain in left thigh, under buttock, 2 weekends ago was waxing his truck and was stiff , and it didn't go away started to get worse , tingling and dull sting to foot, comes and goes. Socriatic nerve?- (perr pt ) Aleve seems to work for a couple hours and heating pad. Seems to be getting worse. )      8/23/2024    10:37 AM   Additional Questions   Roomed by Yamileth "   Accompanied by Self         8/23/2024    10:37 AM   Patient Reported Additional Medications   Patient reports taking the following new medications N/a     Travon is a 68 year old male who presents to clinic for chronic back pain. He was seeing Ortho for his hip and was told it was fine but likely the pain was coming from his back. He has been experiencing trouble walking. It stings a little in the back side of the left hip. He has a small yard, but it is taking a long time to get the yard work done due to pain. He often has to stop and rest while mowing. Two weeks ago, he washed and waxed his truck over the course of a couple days. He woke up Sunday morning a little stiff.  He chalked it up to low activity levels. He finished the truck Sunday and started feeling pain at the ball and socket area. Later Sunday, he began having low back pain, a little on the right but more on the left. It comes and goes. Started taking Aleve and adding heat, finding that helpful, but does not last long. Had blunt stinging pain that runs from back, down left leg and then under foot to the big toe. Sometimes gets sore sitting in a chair. Waking up in the moring not too bad but has a old michael size water bed and has gotten harder climbing out ofd that. Played golf a couple times in the past 2 weeks. Back pain was bothering him while playing golf on the down swing. Currently leveled off and finally decided to be what it is. Usually takes an Aleve every morning. Now taking 2 am and pm.     Also has a ripped bicep tendon loose and has not followed up on that since. He will need surgery to that.     History of Present Illness       Back Pain:  He presents for follow up of back pain. Patient's back pain is a new problem.    Original cause of back pain: turning/bending  First noticed back pain: 1-4 weeks ago  Patient feels back pain: dailyLocation of back pain:  Other  Description of back pain: shooting  Back pain spreads: left thigh    Since  "patient first noticed back pain, pain is: always present, but gets better and worse  Does back pain interfere with his job:  Yes  On a scale of 1-10 (10 being the worst), patient describes pain as:  6  What makes back pain worse: bending   Acupuncture: not tried  Acetaminophen: not tried  Activity or exercise: not helpful  Chiropractor:  Not tried  Cold: not helpful  Heat: helpful  Massage: not tried  Muscle relaxants: not tried  NSAIDS: helpful  Opioids: not tried  Physical Therapy: not tried  Rest: helpful  Steroid Injection: not tried  Stretching: helpful  Surgery: not tried  TENS unit: not tried  Topical pain relievers: not tried  Other healthcare providers patient is seeing for back pain: None   He is taking medications regularly.       Review of Systems  No incontinence, no foot drop, no saddle anesthesia. Legs not giving out.         Objective    BP (!) 158/96   Pulse 78   Temp 96.8  F (36  C) (Tympanic)   Resp 20   Ht 1.778 m (5' 10\")   Wt 128.4 kg (283 lb)   SpO2 97%   BMI 40.61 kg/m    Body mass index is 40.61 kg/m .  Physical Exam  Vitals reviewed.   Constitutional:       Appearance: Normal appearance. He is obese.   Pulmonary:      Effort: Pulmonary effort is normal.   Musculoskeletal:      Lumbar back: Positive left straight leg raise test. Negative right straight leg raise test.      Comments: Gait antalgic. Able to climb exam table up and down. Able to lay on back. Sciatic notch TTP left.    Neurological:      Mental Status: He is alert.        No results found for this visit on 08/23/24.        Signed Electronically by: SHAHID GELLER PA-C    "

## 2024-08-27 ENCOUNTER — OFFICE VISIT (OUTPATIENT)
Dept: ORTHOPEDICS | Facility: CLINIC | Age: 69
End: 2024-08-27
Payer: COMMERCIAL

## 2024-08-27 VITALS — BODY MASS INDEX: 40.52 KG/M2 | HEIGHT: 70 IN | WEIGHT: 283 LBS

## 2024-08-27 DIAGNOSIS — S46.211A RUPTURE OF RIGHT DISTAL BICEPS TENDON, INITIAL ENCOUNTER: Primary | ICD-10-CM

## 2024-08-27 DIAGNOSIS — G56.02 CARPAL TUNNEL SYNDROME OF LEFT WRIST: ICD-10-CM

## 2024-08-27 DIAGNOSIS — S46.212S RUPTURE OF LEFT DISTAL BICEPS TENDON, SEQUELA: ICD-10-CM

## 2024-08-27 PROCEDURE — 99215 OFFICE O/P EST HI 40 MIN: CPT | Performed by: STUDENT IN AN ORGANIZED HEALTH CARE EDUCATION/TRAINING PROGRAM

## 2024-08-27 NOTE — PROGRESS NOTES
Date of Service: 8/27/24    Chief Complaint: Post operative follow up.     Date of Surgery: 3/15/24    Procedure Performed: Right distal biceps tendon repair      8/27/24 Interval events: Patient was last seen by Dariusz Knox on 7/15/24. H states that his right elbow has been doing well for the most part. Has started lifting about 20 lbs and exercising. Does report weakness with some tasks such as pouring milk. This causes a burning sensation over his brachioradialis. He continues to have left carpal tunnel symptoms. Numbness is predominantly in median nerve distribution, does not notice much in the small finger. Notes onset when holding his phone up at night. He obtained an MRI of his left elbow and wants to discuss if anything can be done there.    6/11/24 Interval events: Tad Olivas is a 68 year old male who presents today for a postoperative follow up.  He is doing well.  Pain is well-controlled.  Denies numbness or tingling.  He is now 3 months postop.  He has had excellent return of motion.  He has not yet progressed weightbearing or strengthening.    Patient again brings up investigating the left elbow to see if anything can be done.  While he has adapted to the deficit, he remains weak and he does notice it and finds it bothersome.  He previously discussed carpal tunnel syndrome with Liza Wang PA-C.  He did not report this today.  However previously, he reported that carpal tunnel symptoms of the left hand have become more bothersome since recovering from his right upper extremity surgery.  He notes numbness and tingling of the thumb, index, middle finger.  This is worse at night or when holding a book or driving.  He had a steroid injection at his last visit.    The past medical history was reviewed updated in the EMR. This includes medications, surgeries, social history, and review of systems.    Physical examination:  Well-developed, well-nourished and in no acute distress.  Alert and oriented  to surroundings.  On examination of the  right upper extremity, incision is well-healed. There is no erythema, drainage, or dehiscence.  No swelling.  No ecchymoses.  Sensation and motor is intact in median, radial and ulnar nerve distributions. Patient can actively flex and extend all digits and thumb.  Palpable robust distal biceps tendon in the antecubital fossa.  Excellent 5 -/5 strength in supination, 5/5 strength in biceps flexion.  Fingers are warm and well-perfused. Radial pulse is palpable.  Full flexion extension of the elbow.    On examination of the left upper extremity, positive Tinel's at the carpal tunnel, positive Phalen's, decreased sensation to light touch in the median nerve distribution.  Negative Tinel's at the cubital tunnel.  Weakness in supination    Radiographs: Three views of the right elbow were obtained previously and reviewed. These demonstrate able postsurgical changes of distal biceps button fixation within the proximal radius.    MRI left elbow 7/9/2024 demonstrates markedly attenuated biceps tendon that is difficult to discern in the setting of likely postsurgical scarring after prior tenodesis to the brachialis.  There does appear to be some fatty at the distal aspect of the biceps muscle belly.    Assessment: 68 year old male s/p Right distal biceps tendon repair, progressing appropriately.  Left carpal tunnel syndrome.  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    Plan:    -Patient has had excellent return of motion with already excellent strength  -The patient may weight-bear as tolerated without restrictions regarding the right elbow.  Continue exercises.  Anticipate generalized weakness of the right upper extremity will continue to improve.  -Left carpal tunnel symptoms seem to be worsening.  The patient is a candidate for a carpal tunnel release.  The details, risk, benefits of the surgery were discussed.  -I had a long discussion with  the patient regarding the MRI findings and state of the left chronic distal biceps tear.  Unfortunately the distal biceps stump is difficult to discern.  I advised that reconstruction with allograft will be an extensive and challenging surgery.  I could not guarantee its success given the findings on MRI.  It is possible that we would find that it is not reconstructable intraoperatively.  It is also likely that the biceps muscle itself has undergone atrophy over the years and is weak.  Repair/reconstruction failure would be a risk.  He has been compensating okay over the last decade but still has some limitations with strength particularly.    The patient will think about his options.  He will let me know how he would like to proceed.  While I am willing to try to reconstruct the left biceps tendon, I unfortunately cannot guarantee results regarding his success.    All questions answered.  The patient voiced understanding and agreement.    I spent a total of 45 minutes with Tad Olivas during today's office visit. Over 50% of this time was spent counseling the patient, performing the history and physical exam, and coordinating care. Remainder of time spent on chart review on documentation.    Jose Britt MD    Hand & Upper Extremity Surgery  Department of Orthopedic Surgery  [facility]

## 2024-08-27 NOTE — LETTER
8/27/2024      Tad Olivas  [address]  [place] [address]      Dear Colleague,    Thank you for referring your patient, Tad Olivas, to the [facility] [place]. Please see a copy of my visit note below.    Date of Service: 8/27/24    Chief Complaint: Post operative follow up.     Date of Surgery: 3/15/24    Procedure Performed: Right distal biceps tendon repair      8/27/24 Interval events: Patient was last seen by Dariusz Knox on 7/15/24. H states that his right elbow has been doing well for the most part. Has started lifting about 20 lbs and exercising. Does report weakness with some tasks such as pouring milk. This causes a burning sensation over his brachioradialis. He continues to have left carpal tunnel symptoms. Numbness is predominantly in median nerve distribution, does not notice much in the small finger. Notes onset when holding his phone up at night. He obtained an MRI of his left elbow and wants to discuss if anything can be done there.    6/11/24 Interval events: Tad Olivas is a 68 year old male who presents today for a postoperative follow up.  He is doing well.  Pain is well-controlled.  Denies numbness or tingling.  He is now 3 months postop.  He has had excellent return of motion.  He has not yet progressed weightbearing or strengthening.    Patient again brings up investigating the left elbow to see if anything can be done.  While he has adapted to the deficit, he remains weak and he does notice it and finds it bothersome.  He previously discussed carpal tunnel syndrome with Liza Wang PA-C.  He did not report this today.  However previously, he reported that carpal tunnel symptoms of the left hand have become more bothersome since recovering from his right upper extremity surgery.  He notes numbness and tingling of the thumb, index, middle finger.  This is worse at night or when holding a book or driving.  He had a steroid injection at his  last visit.    The past medical history was reviewed updated in the EMR. This includes medications, surgeries, social history, and review of systems.    Physical examination:  Well-developed, well-nourished and in no acute distress.  Alert and oriented to surroundings.  On examination of the  right upper extremity, incision is well-healed. There is no erythema, drainage, or dehiscence.  No swelling.  No ecchymoses.  Sensation and motor is intact in median, radial and ulnar nerve distributions. Patient can actively flex and extend all digits and thumb.  Palpable robust distal biceps tendon in the antecubital fossa.  Excellent 5 -/5 strength in supination, 5/5 strength in biceps flexion.  Fingers are warm and well-perfused. Radial pulse is palpable.  Full flexion extension of the elbow.    On examination of the left upper extremity, positive Tinel's at the carpal tunnel, positive Phalen's, decreased sensation to light touch in the median nerve distribution.  Negative Tinel's at the cubital tunnel.  Weakness in supination    Radiographs: Three views of the right elbow were obtained previously and reviewed. These demonstrate able postsurgical changes of distal biceps button fixation within the proximal radius.    MRI left elbow 7/9/2024 demonstrates markedly attenuated biceps tendon that is difficult to discern in the setting of likely postsurgical scarring after prior tenodesis to the brachialis.  There does appear to be some fatty at the distal aspect of the biceps muscle belly.    Assessment: 68 year old male s/p Right distal biceps tendon repair, progressing appropriately.  Left carpal tunnel syndrome.  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    Plan:    -Patient has had excellent return of motion with already excellent strength  -The patient may weight-bear as tolerated without restrictions regarding the right elbow.  Continue exercises.  Anticipate generalized weakness of  the right upper extremity will continue to improve.  -Left carpal tunnel symptoms seem to be worsening.  The patient is a candidate for a carpal tunnel release.  The details, risk, benefits of the surgery were discussed.  -I had a long discussion with the patient regarding the MRI findings and state of the left chronic distal biceps tear.  Unfortunately the distal biceps stump is difficult to discern.  I advised that reconstruction with allograft will be an extensive and challenging surgery.  I could not guarantee its success given the findings on MRI.  It is possible that we would find that it is not reconstructable intraoperatively.  It is also likely that the biceps muscle itself has undergone atrophy over the years and is weak.  Repair/reconstruction failure would be a risk.  He has been compensating okay over the last decade but still has some limitations with strength particularly.    The patient will think about his options.  He will let me know how he would like to proceed.  While I am willing to try to reconstruct the left biceps tendon, I unfortunately cannot guarantee results regarding his success.    All questions answered.  The patient voiced understanding and agreement.    I spent a total of 45 minutes with Tad Olivas during today's office visit. Over 50% of this time was spent counseling the patient, performing the history and physical exam, and coordinating care. Remainder of time spent on chart review on documentation.    Jose Britt MD    Hand & Upper Extremity Surgery  Department of Orthopedic Surgery  [facility]      Again, thank you for allowing me to participate in the care of your patient.        Sincerely,        Jose Britt MD

## 2024-09-05 ENCOUNTER — MYC MEDICAL ADVICE (OUTPATIENT)
Dept: FAMILY MEDICINE | Facility: CLINIC | Age: 69
End: 2024-09-05
Payer: COMMERCIAL

## 2024-09-06 ENCOUNTER — OFFICE VISIT (OUTPATIENT)
Dept: FAMILY MEDICINE | Facility: CLINIC | Age: 69
End: 2024-09-06
Payer: COMMERCIAL

## 2024-09-06 ENCOUNTER — TELEPHONE (OUTPATIENT)
Dept: FAMILY MEDICINE | Facility: CLINIC | Age: 69
End: 2024-09-06

## 2024-09-06 VITALS
BODY MASS INDEX: 40.37 KG/M2 | RESPIRATION RATE: 16 BRPM | WEIGHT: 282 LBS | OXYGEN SATURATION: 96 % | HEIGHT: 70 IN | TEMPERATURE: 97.5 F | HEART RATE: 89 BPM | DIASTOLIC BLOOD PRESSURE: 80 MMHG | SYSTOLIC BLOOD PRESSURE: 139 MMHG

## 2024-09-06 DIAGNOSIS — B34.9 VIRAL ILLNESS: ICD-10-CM

## 2024-09-06 DIAGNOSIS — F33.1 MAJOR DEPRESSIVE DISORDER, RECURRENT EPISODE, MODERATE (H): ICD-10-CM

## 2024-09-06 DIAGNOSIS — R53.83 OTHER FATIGUE: Primary | ICD-10-CM

## 2024-09-06 LAB
ALBUMIN SERPL BCG-MCNC: 3.6 G/DL (ref 3.5–5.2)
ALP SERPL-CCNC: 74 U/L (ref 40–150)
ALT SERPL W P-5'-P-CCNC: 38 U/L (ref 0–70)
ANION GAP SERPL CALCULATED.3IONS-SCNC: 11 MMOL/L (ref 7–15)
AST SERPL W P-5'-P-CCNC: 27 U/L (ref 0–45)
BASOPHILS # BLD AUTO: 0 10E3/UL (ref 0–0.2)
BASOPHILS NFR BLD AUTO: 0 %
BILIRUB SERPL-MCNC: 0.7 MG/DL
BUN SERPL-MCNC: 23 MG/DL (ref 8–23)
CALCIUM SERPL-MCNC: 9 MG/DL (ref 8.8–10.4)
CHLORIDE SERPL-SCNC: 105 MMOL/L (ref 98–107)
CREAT SERPL-MCNC: 1.13 MG/DL (ref 0.67–1.17)
EGFRCR SERPLBLD CKD-EPI 2021: 70 ML/MIN/1.73M2
EOSINOPHIL # BLD AUTO: 0.2 10E3/UL (ref 0–0.7)
EOSINOPHIL NFR BLD AUTO: 2 %
ERYTHROCYTE [DISTWIDTH] IN BLOOD BY AUTOMATED COUNT: 12 % (ref 10–15)
GLUCOSE SERPL-MCNC: 111 MG/DL (ref 70–99)
HCO3 SERPL-SCNC: 23 MMOL/L (ref 22–29)
HCT VFR BLD AUTO: 42.7 % (ref 40–53)
HGB BLD-MCNC: 14.1 G/DL (ref 13.3–17.7)
IMM GRANULOCYTES # BLD: 0 10E3/UL
IMM GRANULOCYTES NFR BLD: 0 %
LYMPHOCYTES # BLD AUTO: 1 10E3/UL (ref 0.8–5.3)
LYMPHOCYTES NFR BLD AUTO: 11 %
MCH RBC QN AUTO: 29.1 PG (ref 26.5–33)
MCHC RBC AUTO-ENTMCNC: 33 G/DL (ref 31.5–36.5)
MCV RBC AUTO: 88 FL (ref 78–100)
MONOCYTES # BLD AUTO: 0.9 10E3/UL (ref 0–1.3)
MONOCYTES NFR BLD AUTO: 10 %
NEUTROPHILS # BLD AUTO: 6.5 10E3/UL (ref 1.6–8.3)
NEUTROPHILS NFR BLD AUTO: 76 %
PLATELET # BLD AUTO: 221 10E3/UL (ref 150–450)
POTASSIUM SERPL-SCNC: 4.1 MMOL/L (ref 3.4–5.3)
PROT SERPL-MCNC: 6.5 G/DL (ref 6.4–8.3)
RBC # BLD AUTO: 4.84 10E6/UL (ref 4.4–5.9)
SODIUM SERPL-SCNC: 139 MMOL/L (ref 135–145)
WBC # BLD AUTO: 8.6 10E3/UL (ref 4–11)

## 2024-09-06 PROCEDURE — 85025 COMPLETE CBC W/AUTO DIFF WBC: CPT | Performed by: PHYSICIAN ASSISTANT

## 2024-09-06 PROCEDURE — 99213 OFFICE O/P EST LOW 20 MIN: CPT | Performed by: PHYSICIAN ASSISTANT

## 2024-09-06 PROCEDURE — 80053 COMPREHEN METABOLIC PANEL: CPT | Performed by: PHYSICIAN ASSISTANT

## 2024-09-06 PROCEDURE — 36415 COLL VENOUS BLD VENIPUNCTURE: CPT | Performed by: PHYSICIAN ASSISTANT

## 2024-09-06 PROCEDURE — 93000 ELECTROCARDIOGRAM COMPLETE: CPT | Performed by: PHYSICIAN ASSISTANT

## 2024-09-06 PROCEDURE — 87635 SARS-COV-2 COVID-19 AMP PRB: CPT | Performed by: PHYSICIAN ASSISTANT

## 2024-09-06 RX ORDER — BUPROPION HYDROCHLORIDE 100 MG/1
TABLET, EXTENDED RELEASE ORAL
Qty: 90 TABLET | Refills: 1 | OUTPATIENT
Start: 2024-09-06

## 2024-09-06 RX ORDER — BUPROPION HYDROCHLORIDE 100 MG/1
TABLET, EXTENDED RELEASE ORAL
Qty: 90 TABLET | Refills: 1 | Status: SHIPPED | OUTPATIENT
Start: 2024-09-06

## 2024-09-06 ASSESSMENT — ENCOUNTER SYMPTOMS: FATIGUE: 1

## 2024-09-06 ASSESSMENT — PAIN SCALES - GENERAL: PAINLEVEL: NO PAIN (0)

## 2024-09-06 NOTE — TELEPHONE ENCOUNTER
Called patient this morning to assist in scheduling an appointment to be seen for fatigue.     Patient feeling well in the morning, but as the day goes on, patient reports gets easily tired and fatigued.     Future Appointments 9/6/2024 - 3/5/2025        Date Visit Type Length Department Provider     9/6/2024  9:00 AM OFFICE VISIT 30 min [facility] Oppel, Sal Magana PA-C    Location Instructions:     [facility] is located at [address], just north of the intersection with [facility]. The patient parking lot and entrance is on the [facility] s north side.              9/9/2024 11:00 AM HAND THERAPY TREATMENT 30 min [facility] Wanda Brody OT    Location Instructions:     Our clinic is located at:  [address] [place] [address]  How to find our clinic: The clinic is located in the [facility] on the [facility] of [address] [place] [address] and [facility], [place].&nbsp; Check in is on second floor in [address].&nbsp; Please note this clinic is not at the [place] [facility]  Parking: Free parking available in surfact lot.  Questions or to Reschedule: Contact our clinic: [phone].               9/17/2024  1:10 PM SPINE EVALUATION 40 min [facility] BL PT Malissa Bates, PT    Location Instructions:     Our clinic is located at:  [address] [place] [address]  How to find our clinic: The clinic is located in the [facility] on the [facility] corner [address] and [facility], [place]. Check in is on second floor in [address].&nbsp; Please note this clinic is not at the [place] [facility].  Parking: Free parking available in surface lot.  Questions or to Reschedule: Contact our clinic: [phone].               9/27/2024  1:50  PM SPINE TREATMENT 40 min [facility] BL PT Malissa Bates PT    Location Instructions:     Our clinic is located at:  [facility] [address] [place] [address] [address] [place] [address]  How to find our clinic: The clinic is located in the [facility] on the Northeast corner of [address] [place] [address] and [facility]. Check in is on second floor in [address].&nbsp; Please note this clinic is not at the [facility].  Parking: Free parking available in surface lot.  Questions or to Reschedule: Contact our clinic: [phone].               11/8/2024 10:30 AM WELCOME TO MEDICARE 30 min [facility] Clement De Jesus MD    Location Instructions:     [facility] is located at [address] [address], just north of the intersection with [facility]. The patient parking lot and entrance is on the [facility] s north side.              1/16/2025 10:30 AM RETURN PATIENT 30 min [facility] UROLOGY Octavio Murphy PA-C    Location Instructions:     The [facility] ([facility]) is in [place] area with multiple transportation and parking options. You may wish to review options for  service and self-parking in more detail on the [facility] s website at [email or web address].&nbsp;                2/26/2025  1:45 PM NEW DERMATOLOGY 30 min [facility] DERM Arlene Mario, PAAVTAR Suarez RN

## 2024-09-06 NOTE — PROGRESS NOTES
Assessment & Plan       ICD-10-CM    1. Other fatigue  R53.83 Comprehensive metabolic panel (BMP + Alb, Alk Phos, ALT, AST, Total. Bili, TP)     CBC with platelets and differential     Symptomatic COVID-19 Virus (Coronavirus) by PCR Nose     EKG 12-lead complete w/read - Clinics     Comprehensive metabolic panel (BMP + Alb, Alk Phos, ALT, AST, Total. Bili, TP)     CBC with platelets and differential      2. Viral illness  B34.9       Talk to patient about his concerns labs are pending.  Follow-up depend on lab results.  I suspect he is get a viral illness.  We talked about continue conservative management.  Warning signs were discussed.  Recheck in a week or sooner if needed.    Subjective   Travon is a 68 year old, presenting for the following health issues:  Fatigue    History of Present Illness       Back Pain:  He presents for follow up of back pain. Patient's back pain is a new problem.    Original cause of back pain: turning/bending  First noticed back pain: 1-4 weeks ago  Patient feels back pain: dailyLocation of back pain:  Other  Description of back pain: shooting  Back pain spreads: left thigh    Since patient first noticed back pain, pain is: always present, but gets better and worse  Does back pain interfere with his job:  Yes  On a scale of 1-10 (10 being the worst), patient describes pain as:  6  What makes back pain worse: bending   Acupuncture: not tried  Acetaminophen: not tried  Activity or exercise: not helpful  Chiropractor:  Not tried  Cold: not helpful  Heat: helpful  Massage: not tried  Muscle relaxants: not tried  NSAIDS: helpful  Opioids: not tried  Physical Therapy: not tried  Rest: helpful  Steroid Injection: not tried  Stretching: helpful  Surgery: not tried  TENS unit: not tried  Topical pain relievers: not tried  Other healthcare providers patient is seeing for back pain: None   He is taking medications regularly.     Struggling with 1 wk of fatigue. Thinks he has COVID last week.  "  Similar symptoms as when he had it in the past.  He is unsure of any known COVID exposures.  Symptoms: fatigue, brain fog, loss of energy.   Feels better in the a.m and worse as the day goes on.   No nausea, vomiting, diarrhea. No chest pain or short of breath.       Review of Systems  Constitutional, HEENT, cardiovascular, pulmonary, gi and gu systems are negative, except as otherwise noted.      Objective    /80   Pulse 89   Temp 97.5  F (36.4  C) (Tympanic)   Resp 16   Ht 1.778 m (5' 10\")   Wt 127.9 kg (282 lb)   SpO2 96%   BMI 40.46 kg/m    Body mass index is 40.46 kg/m .  Physical Exam   GENERAL: alert and no distress  EYES: Eyes grossly normal to inspection, PERRL and conjunctivae and sclerae normal  HENT: ear canals and TM's normal, nose and mouth without ulcers or lesions  NECK: no adenopathy, no asymmetry, masses, or scars  RESP: lungs clear to auscultation - no rales, rhonchi or wheezes  CV: regular rate and rhythm, normal S1 S2, no S3 or S4, no murmur, click or rub, no peripheral edema  ABDOMEN: soft, nontender, no hepatosplenomegaly, no masses and bowel sounds normal  MS: no gross musculoskeletal defects noted, no edema  SKIN: no suspicious lesions or rashes  NEURO: Normal strength and tone, mentation intact and speech normal  PSYCH: mentation appears normal, affect normal/bright    EKG - Reviewed and interpreted by me appears normal, NSR, normal axis, normal intervals, no acute ST/T changes c/w ischemia, no LVH by voltage criteria, unchanged from previous tracings  Labs pending        Signed Electronically by: Sal Atkinson PA-C    "

## 2024-09-06 NOTE — TELEPHONE ENCOUNTER
Patient is asking if his medication below has been refilled.    Nursing support: He was advised that it has been refilled.  Thank you. Candy Garcia R.N.

## 2024-09-06 NOTE — TELEPHONE ENCOUNTER
Pt calling re: prescription refill request.    Pt says he has the last bottle that he filled and it was from 5/7/24. Advised that he should have 1 refill left on file at his pharmacy. Pt states that he just called the pharmacy and they told him he does not have any refills.    Called [facility]'s pharmacy to confirm and they say that he does not have any refills on file, despite the fact that his order was for a 90 day supply with 1 refill.     Will route high priority request to provider for another refill of 100mg Bupropion tabs, as patient has been out since last week he says.    Liza Yee, RN, BSN  [facility]

## 2024-09-07 LAB — SARS-COV-2 RNA RESP QL NAA+PROBE: NEGATIVE

## 2024-09-10 NOTE — RESULT ENCOUNTER NOTE
MrAlexx Olivas,    All of your labs were normal/near normal for you.    Please contact the clinic if you have additional questions.  Thank you.    Sincerely,    Sal Atkinson PA-C

## 2024-09-17 ENCOUNTER — THERAPY VISIT (OUTPATIENT)
Dept: OCCUPATIONAL THERAPY | Facility: CLINIC | Age: 69
End: 2024-09-17
Payer: COMMERCIAL

## 2024-09-17 ENCOUNTER — THERAPY VISIT (OUTPATIENT)
Dept: PHYSICAL THERAPY | Facility: CLINIC | Age: 69
End: 2024-09-17
Attending: PHYSICIAN ASSISTANT
Payer: COMMERCIAL

## 2024-09-17 DIAGNOSIS — M25.521 RIGHT ELBOW PAIN: Primary | ICD-10-CM

## 2024-09-17 DIAGNOSIS — Z47.89 AFTERCARE FOLLOWING SURGERY OF THE MUSCULOSKELETAL SYSTEM, NEC: ICD-10-CM

## 2024-09-17 DIAGNOSIS — M54.42 CHRONIC BILATERAL LOW BACK PAIN WITH BILATERAL SCIATICA: ICD-10-CM

## 2024-09-17 DIAGNOSIS — M54.41 CHRONIC BILATERAL LOW BACK PAIN WITH BILATERAL SCIATICA: ICD-10-CM

## 2024-09-17 DIAGNOSIS — G89.29 CHRONIC BILATERAL LOW BACK PAIN WITH BILATERAL SCIATICA: ICD-10-CM

## 2024-09-17 PROCEDURE — 97110 THERAPEUTIC EXERCISES: CPT | Mod: GP | Performed by: PHYSICAL THERAPIST

## 2024-09-17 PROCEDURE — 97161 PT EVAL LOW COMPLEX 20 MIN: CPT | Mod: GP | Performed by: PHYSICAL THERAPIST

## 2024-09-17 PROCEDURE — 97110 THERAPEUTIC EXERCISES: CPT | Mod: GO

## 2024-09-17 PROCEDURE — 97530 THERAPEUTIC ACTIVITIES: CPT | Mod: GP | Performed by: PHYSICAL THERAPIST

## 2024-09-17 NOTE — PROGRESS NOTES
PHYSICAL THERAPY EVALUATION  Type of Visit: Evaluation    See electronic medical record for Abuse and Falls Screening details.            Subjective     REFERRAL DX: Chronic bilateral low back pain with bilateral sciatica  8/23/2024    SUBJECTIVE HISTORY: Patient is a 68 year old male presenting with hx of low back pain. Had an episode of unexpected sciatic issue. Was washing and waxing his truck one day and then the next day had stiffness, then pain into the back, into the legs. More predominant down the L side, traveling down to the foot. No previous episodes like this in the past. Overall, gradually improved. Have been working on stretching and this seems to help. Finding difficulty with prolonged walking more recently. Acknowledges being more sedentary in the last year.    PAIN:  At rest: 0/10  With activity: minimal now, for the last couple of weeks   Frequency: with positions, activities   Quality:  tingling, burning  Progression:  improving slowly over time   Exacerbated by: in the past: bending down, sitting.   Eased by: stretches       Objective         Presenting condition or subjective complaint: sciatic nerve pain  Date of onset: 08/23/24    Relevant medical history: Depression; High blood pressure; Implanted device; Overweight   Dates & types of surgery: check chart    Prior diagnostic imaging/testing results: X-ray     February 2024:  THORACIC: Subtle height loss of multiple midthoracic vertebral bodies, presumably chronic. Otherwise, no fracture identified. Moderate degenerative change.      LUMBAR: No fracture is identified. Moderate to severe disc height loss at L5-S1. Background of mild disc height loss. Severe lower lumbar spine facet arthropathy. Multilevel grade 1 spondylolisthesis, worst at L4-L5.    Prior therapy history for the same diagnosis, illness or injury: No      Living Environment  Social support: Alone   Type of home: House   Stairs to enter the home: Yes 2 Is there a railing: Yes      Ramp: No   Stairs inside the home: Yes 10 Is there a railing: Yes     Help at home: None  Equipment owned:       Employment: Not Applicable    Hobbies/Interests:      Patient goals for therapy: stretch    LUMBAR SPINE OBJECTIVE  ROM:   (Degrees) Left AROM Left PROM  Right AROM Right PROM   Hip Flexion       Hip Extension       Hip Abduction       Hip Internal Rotation WNL WNL WNL WNL   Hip External Rotation WNL WNL WNL WNL   Lumbar Side glide WNL, tightness to R WNL, tightness to L   Lumbar Flexion WNL, tightness in the HS   Lumbar Extension WNL     STRENGTH:   Pain: - none + mild ++ moderate +++ severe  Strength Scale: 0-5/5 Left Right   Hip Flexion 5 5   Hip Extension 4 4   Hip Abduction 4 3   Hip Internal Rotation 5 5   Hip External Rotation 5 5   Knee Flexion 5 5   Knee Extension 5 5     LUMBAR/HIP Special Tests:    Left Right   FENG Negative  Negative    FADIR/Labrum/MARIALUISA Negative  Negative    Muriel's Negative  Negative    Piriformis Negative  Negative    SLR Negative  Negative       PELVIS/SI SPECIAL TESTS:    Left Right Additional Notes   ASLR      Gaenslen's Test      Pelvic Compression - -    Pelvic Gapping - -    Sacral Thrust      Thigh Thrust        PALPATION:   + Tenderness At Location Left Right   Quadratus Lumborum + +   Erector Spinae + +   Piriformis  - -   PSIS - -   ASIS - -   Iliac Crest - -   Glut Medius - -   Greater Trochanter - -       BALANCE/PROPRIOCEPTION:  R [place], difficulty maintaining neutral spine in R SLS  MYOTOMES: WNL  NEURAL TENSION: Lumbar WNL      Assessment & Plan   CLINICAL IMPRESSIONS  Medical Diagnosis: Chronic LBP with B sciatica    Treatment Diagnosis: Chronic LBP with B sciatica   Impression/Assessment: Patient is a 68 year old male with LBP complaints.  The following significant findings have been identified: Decreased ROM/flexibility, Decreased strength, Impaired muscle performance, and Decreased activity tolerance. These impairments interfere with their ability  to perform household chores, household mobility, and community mobility as compared to previous level of function.     Clinical Decision Making (Complexity):  Clinical Presentation: Stable/Uncomplicated  Clinical Presentation Rationale: based on medical and personal factors listed in PT evaluation  Clinical Decision Making (Complexity): Low complexity    PLAN OF CARE  Treatment Interventions:  Modalities: Cryotherapy, Dry Needling, E-stim, Hot Pack  Interventions: Gait Training, Manual Therapy, Neuromuscular Re-education, Therapeutic Activity, Therapeutic Exercise, Self-Care/Home Management    Long Term Goals     PT Goal 1  Goal Description: Patient will demo >4/5 hip abduction strength bilaterally  Rationale: to maximize safety and independence with performance of ADLs and functional tasks  Target Date: 11/12/24  PT Goal 2  Goal Description: Patient will demo 10% improvement on [place]  Rationale: to maximize safety and independence with performance of ADLs and functional tasks  Target Date: 11/12/24      Frequency of Treatment: biweekly-monthly  Duration of Treatment: 8 weeks    Education Assessment:   Learner/Method: Patient  Education Comments: PTRx    Risks and benefits of evaluation/treatment have been explained.   Patient/Family/caregiver agrees with Plan of Care.     Evaluation Time:     PT Eval, Low Complexity Minutes ([id number]): 15       Signing Clinician: Malissa Bates, PT        [facility]                                                                                   OUTPATIENT PHYSICAL THERAPY      PLAN OF TREATMENT FOR OUTPATIENT REHABILITATION   Patient's Last Name, First Name, Tad Segovia YOB: 1955   Provider's Name   [facility]   Medical Record No.  [id number]     Onset Date: 08/23/24  Start of Care Date: 09/17/24     Medical Diagnosis:  Chronic LBP with B sciatica      PT Treatment Diagnosis:  Chronic LBP with B  sciatica Plan of Treatment  Frequency/Duration: biweekly-monthly/ 8 weeks    Certification date from 09/17/24 to 11/12/24         See note for plan of treatment details and functional goals     Malissa Bates, PT                         I CERTIFY THE NEED FOR THESE SERVICES FURNISHED UNDER        THIS PLAN OF TREATMENT AND WHILE UNDER MY CARE     (Physician attestation of this document indicates review and certification of the therapy plan).              Referring Provider:  Mily Clemens    Initial Assessment  See Epic Evaluation- Start of Care Date: 09/17/24

## 2024-09-21 ASSESSMENT — ANXIETY QUESTIONNAIRES
7. FEELING AFRAID AS IF SOMETHING AWFUL MIGHT HAPPEN: NOT AT ALL
GAD7 TOTAL SCORE: 0

## 2024-09-21 ASSESSMENT — PAIN SCALES - PAIN ENJOYMENT GENERAL ACTIVITY SCALE (PEG)
INTERFERED_ENJOYMENT_LIFE: 0
PEG_TOTALSCORE: INCOMPLETE
AVG_PAIN_PASTWEEK: 0

## 2024-09-23 ENCOUNTER — TELEPHONE (OUTPATIENT)
Dept: ORTHOPEDICS | Facility: CLINIC | Age: 69
End: 2024-09-23
Payer: COMMERCIAL

## 2024-09-23 NOTE — TELEPHONE ENCOUNTER
Patient called to schedule surgery, was seen 08/27/24.    Thank you   Refill approved as requested.

## 2024-09-26 ENCOUNTER — OFFICE VISIT (OUTPATIENT)
Dept: PALLIATIVE MEDICINE | Facility: CLINIC | Age: 69
End: 2024-09-26
Attending: PHYSICIAN ASSISTANT
Payer: COMMERCIAL

## 2024-09-26 VITALS — DIASTOLIC BLOOD PRESSURE: 80 MMHG | SYSTOLIC BLOOD PRESSURE: 122 MMHG | HEART RATE: 74 BPM

## 2024-09-26 DIAGNOSIS — G89.29 CHRONIC PAIN OF BOTH HIPS: ICD-10-CM

## 2024-09-26 DIAGNOSIS — M43.16 SPONDYLOLISTHESIS OF LUMBAR REGION: ICD-10-CM

## 2024-09-26 DIAGNOSIS — M79.18 MYOFASCIAL PAIN: ICD-10-CM

## 2024-09-26 DIAGNOSIS — M54.50 EPISODIC LOW BACK PAIN: Primary | ICD-10-CM

## 2024-09-26 DIAGNOSIS — M25.552 CHRONIC PAIN OF BOTH HIPS: ICD-10-CM

## 2024-09-26 DIAGNOSIS — M25.551 CHRONIC PAIN OF BOTH HIPS: ICD-10-CM

## 2024-09-26 DIAGNOSIS — S46.212S RUPTURE OF LEFT DISTAL BICEPS TENDON, SEQUELA: Primary | ICD-10-CM

## 2024-09-26 DIAGNOSIS — M47.816 SPONDYLOSIS OF LUMBAR SPINE: ICD-10-CM

## 2024-09-26 PROCEDURE — 99205 OFFICE O/P NEW HI 60 MIN: CPT

## 2024-09-26 ASSESSMENT — PAIN SCALES - GENERAL: PAINLEVEL: NO PAIN (0)

## 2024-09-26 NOTE — PATIENT INSTRUCTIONS
Physical Therapy:    Completed PT recently for low back pain, has exercises at home.   Recommend hip flexor stretching and included exercises in after visit summary.     Pain Psychology: N/A    Diagnostic Studies:    Lumbar xray completed 2/29/24 - degenerative changes demonstrated, notably at L5-S1.     Medication Management:   No recommendations at this time. Could consider in future if pain returns.     Procedures:   Not at this time - low back pain episodic and significantly improved since onset in August.     Other Orders/Referrals:   N/A    Follow up with MARILEE Sweeney CNP as needed if pain returns     ----------------------------------------------------------------  Clinic Number:  [phone]   Call with any questions about your care and for scheduling assistance.   Calls are returned Monday through Friday between 8 AM and 4:30 PM. We usually get back to you within 2 business days depending on the issue/request.    If we are prescribing your medications:  For opioid medication refills, call the clinic or send a [employer] message 7 days in advance.  Please include:  Name of requested medication  Name of the pharmacy.  For non-opioid medications, call your pharmacy directly to request a refill. Please allow 3-4 days to be processed.   Per [place] State Law:  All controlled substance prescriptions must be filled within 30 days of being written.    For those controlled substances allowing refills, pickup must occur within 30 days of last fill.      We believe regular attendance is key to your success in our program!    Any time you are unable to keep your appointment we ask that you call us at least 24 hours in advance to cancel.This will allow us to offer the appointment time to another patient.   Multiple missed appointments may lead to dismissal from the clinic.

## 2024-09-26 NOTE — PROGRESS NOTES
[facility]     Date of visit: 9/26/2024    Assessment:  Tad Olivas is a 68 year old male with a past medical history significant for chronic bilateral low back pain with bilateral sciatica, CTS right wrist, GERD, right distal biceps tendon rupture, HTN, depression, s/p right TKA, s/p right carpal tunnel release, s/p right and left biceps tendon repair, who presents with complaints of low back/LE pain.     Low back/leg pain - Onset of pain in August without significant precipitating event/injury. Pain was bilateral in low back and BLE, then prominent left sided low back/leg pain, and at this point now resolved. Although episodic, suspect mixed etiology likely contributing factors, including underlying DJD with overlying myofascial component.     Assigned to [place] nursing team.     Visit diagnoses:   1. Episodic low back pain    2. Spondylosis of lumbar spine    3. Chronic pain of both hips    4. Myofascial pain    5. Spondylolisthesis of lumbar region        Plan:  The following recommendations were given to the patient. Diagnosis, treatment options, risks, benefits, and alternatives were discussed, and all questions were answered. The patient expressed understanding of the plan for management.     I am recommending a multidisciplinary treatment plan to help this patient better manage his pain.  This includes:     Physical Therapy:    Completed PT recently for low back pain, has exercises at home.   Recommend hip flexor stretching and included exercises in after visit summary.     Pain Psychology: N/A    Diagnostic Studies:    Lumbar xray completed 2/29/24 - degenerative changes demonstrated, notably at L5-S1.   Will consider additional imaging in future, if pain symptoms recur - lumbar flex/ext xrays, repeat hips/pelvis xray, lumbar MRI.     Medication Management:   No recommendations at this time. Could consider in future if pain returns.     Procedures:   Not at this time - low back  "pain episodic and significantly improved since onset in August.     Other Orders/Referrals:   N/A    Follow up with MARILEE Sweeney CNP as needed if pain returns         Review of Electronic Chart: Today I have also reviewed available medical information in the patient's medical record at [facility] ([facility]) and Care Everywhere (if available), including relevant provider notes, laboratory work, and imaging.     Rose Melgar, MICHELLE, APRN, AGNP-C  [facility]         -------------------------------------------------------------------    Subjective     Reason for consultation:    Tad Olivas is a 68 year old male who is seen in consultation today at the request of Mily Clemens PA-C (primary care) for evaluation of his pain issues and recommendations for management, with specific emphasis on  Chronic bilateral low back pain with bilateral sciatica [M54.42, M54.41, G89.29]  - Primary     Reason for Referral:Comprehensive Pain Evaluation Are there any red flags that may impact the assessment or management of the patient?No Red Flags Provider, please review opioid agreement in the process instructions above. Do you agree to these terms?No Please indicate the reason for your response of \"No\" above. Note that one of our physician leaders will review your request and a member may reach out to follow up.    Please see the [facility] [place] health questionnaire which the patient completed and reviewed with me in detail (if available).     Review of [place] Prescription Monitoring Program (): No concern for abuse or misuse of controlled medications based on this report. Reviewed - diazepam, oxycodone x 2 (small quantity), no ongoing CS medications listed on report.     Review of Electronic Chart: Today I have also reviewed available medical information in the patient's medical record at [facility] ([facility]), including relevant provider notes, laboratory work, and imaging. "       Chief Complaint:    Chief Complaint   Patient presents with    Pain         HPI:     Tad Olivas is a 68 year old male presents with a chief complaint of bilateral low back/BLE pain, left sided low back and LLE pain.     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   Alleviating factors - PT has helped improve pain.   Exacerbating factors - Activities when pain was flared up.   Past treatments tried (H=helpful, NH=not helpful) - PT  Treatments never tried/potential options - injections, medications.  Red flag symptom concerns present (extremity numbness/paraesthesia or weakness, saddle anesthesia, loss of bowel or bladder control, signs/symptoms of infection) - NO  Past pain clinic(s) - NO    -He was referred to PT by PCP.   -He reports having issues with hips.  -He's had a string of health issues, biceps tendon rupture.   -He was a  for many years. He still works part time.         Patient Supplied Answers To the  Pain Questionnaire      9/21/2024     2:43 PM   [facility] -  Patient Entered Questionnaire/Answers   What number best describes your pain right now:  0 = No pain  to  10 = Worst pain imaginable 0   How would you describe the pain dull, aching   Which of the following worsen your pain standing    sitting    walking   Which of the following improve or reduce your pain medication   What number best describes your average pain for the past week:  0 = No pain  to  10 = Worst pain imaginable 0   What number best describes your LOWEST pain in past 24 hours:  0 = No pain  to  10 = Worst pain imaginable 0   What number best describes your WORST pain in past 24 hours:  0 = No pain  to  10 = Worst pain imaginable 0   When is your pain worst Constant   What non-medicine treatments have you already had for your pain physical therapy    counseling     exercise        Current Pain Treatments:     PT HEP - provided hip flexor stretches in AVS      Current Outpatient Medications   Medication Sig Dispense Refill    acetaminophen (TYLENOL) 500 MG tablet Take 1-2 tablets (500-1,000 mg) by mouth every 8 hours as needed for mild pain 40 tablet 0    albuterol (PROAIR HFA/PROVENTIL HFA/VENTOLIN HFA) 108 (90 Base) MCG/ACT inhaler Inhale 2 puffs into the lungs every 6 hours 8 g 1    amLODIPine (NORVASC) 5 MG tablet TAKE ONE TABLET BY MOUTH ONCE DAILY for blood pressure 90 tablet 3    amoxicillin (AMOXIL) 500 MG capsule Take 4 capsules (2,000 mg) by mouth daily For one dose prior to dental procedures 4 capsule 0    ascorbic acid (VITAMIN C) 500 MG tablet Take 1 tablet by mouth daily. (Patient taking differently: Take 500 mg by mouth every morning.) 100 tablet 3    atorvastatin (LIPITOR) 80 MG tablet Take 1 tablet (80 mg) by mouth every morning TAKE ONE TABLET BY MOUTH ONCE DAILY FOR CHOLESTEROL 90 tablet 3    buPROPion (WELLBUTRIN SR) 100 MG 12 hr tablet TAKE ONE TABLET BY MOUTH EVERY MORNING 90 tablet 1    Cholecalciferol (VITAMIN D3 PO) Take 2,000 Units by mouth every morning      cyclobenzaprine (FLEXERIL) 10 MG tablet Take 1 tablet (10 mg) by mouth nightly as needed for muscle spasms 30 tablet 0    diclofenac (VOLTAREN) 1 % topical gel Apply 4 g topically 4 times daily 150 g 4    finasteride (PROSCAR) 5 MG tablet Take 1 tablet (5 mg) by mouth daily (Patient taking differently: Take 1 tablet by mouth every morning.) 90 tablet 3    fish oil-omega-3 fatty acids 500 MG capsule Take by mouth every morning      FLUoxetine (PROZAC) 20 MG capsule Take 1 capsule (20 mg) by mouth daily 90 capsule 1    Glucosamine-Chondroit-Vit C-Mn (GLUCOSAMINE-CHONDROITINOITIN) CAPS Take 1 each by mouth daily. (Patient taking differently: Take 1 each by mouth every morning.) 90 capsule 3    Multiple vitamin TABS Take 1 tablet by mouth daily. (Patient taking differently: Take 1 tablet by mouth  every morning.) 90 tablet 3    Naproxen Sodium (ALEVE PO) Take 220 mg by mouth every morning      omeprazole (PRILOSEC) 20 MG DR capsule TAKE ONE CAPSULE BY MOUTH EVERY OTHER DAY 30-60 MINUTES BEFORE A MEAL FOR HEARTBURN (Patient taking differently: 20 mg every other day. TAKE ONE CAPSULE BY MOUTH EVERY OTHER DAY 30-60 MINUTES BEFORE A MEAL FOR HEARTBURN) 45 capsule 3    tadalafil (CIALIS) 5 MG tablet Take 1 tablet (5 mg) by mouth daily (Patient taking differently: Take 5 mg by mouth every morning.) 90 tablet 3    tamsulosin (FLOMAX) 0.4 MG capsule Take 1 capsule (0.4 mg) by mouth daily (Patient taking differently: Take 0.4 mg by mouth every morning.) 90 capsule 3    diazepam (VALIUM) 5 MG tablet Take 60 minutes prior to procedure (Patient not taking: Reported on 9/26/2024) 1 tablet 0     Current Facility-Administered Medications   Medication Dose Route Frequency Provider Last Rate Last Admin    4 mL ropivacaine (NAROPIN) injection 5 mg/mL  4 mL      4 mL at 04/23/24 1334    betamethasone acet & sod phos (CELESTONE) injection 6 mg  6 mg      6 mg at 04/23/24 1334    dexAMETHasone (DECADRON) injection 4 mg  4 mg      4 mg at 04/29/24 1035    lidocaine (PF) (XYLOCAINE) 1 % injection 2 mL  2 mL      2 mL at 04/29/24 1035     No Known Allergies   Past Medical History:   Diagnosis Date    Biceps rupture, distal     Degenerative joint disease     right knee OA    Elevated cholesterol     Erectile dysfunction 12/29/2010    Gastroesophageal reflux disease     Hypertension     Moderate major depression (H) 12/29/2010    Renal cysts, acquired, bilateral 09/26/2012     Past Surgical History:   Procedure Laterality Date    BIOPSY  2017    colon    COLONOSCOPY  2017    EYE SURGERY Bilateral     Cataract    ORTHOPEDIC SURGERY      right knee replacement    RELEASE CARPAL TUNNEL Right 04/29/2022    Procedure: RELEASE, CARPAL TUNNEL, right;  Surgeon: FRANKIE Linder MD;  Location: [facility] OR    REPAIR TENDON BICEPS DISTAL UPPER  EXTREMITY Left     10/15/10    REPAIR TENDON BICEPS DISTAL UPPER EXTREMITY Right 3/15/2024    Procedure: REPAIR, TENDON, RIGHT BICEPS, DISTAL;  Surgeon: Jose Britt MD;  Location: [facility] OR    SOFT TISSUE SURGERY      SURGICAL HISTORY OF -       rt shoulder cartilage repair    SURGICAL HISTORY OF -       cartilage .ligament ,arthroscopy    URETHROPLASTY WITH BUCCAL GRAFT N/A 10/14/2016    Procedure: URETHROPLASTY WITH BUCCAL GRAFT;  Surgeon: Christiano Leger MD;  Location: [facility] OR    UROLOGY PROCEDURE                         DATE:  10/15/2016    urithral stricture      Family History   Problem Relation Age of Onset    Coronary Artery Disease Mother     Hyperlipidemia Mother     Diabetes Father     Cerebrovascular Disease Father     Breast Cancer Father     Anesthesia Reaction Father         reaction unkown    Hypertension Father     Hyperlipidemia Sister     Venous thrombosis No family hx of      Social History     Socioeconomic History    Marital status: Single   Tobacco Use    Smoking status: Former     Current packs/day: 0.00     Types: Cigarettes     Quit date: 1970     Years since quittin.7     Passive exposure: Past    Smokeless tobacco: Never    Tobacco comments:     no 2nd hand smoke exposure   Vaping Use    Vaping status: Never Used   Substance and Sexual Activity    Alcohol use: Yes     Comment: 2-4 drinks weekends    Drug use: Yes     Types: Marijuana     Comment: [facility] 2-4 week    Sexual activity: Not Currently     Partners: Female   Other Topics Concern    Parent/sibling w/ CABG, MI or angioplasty before 65F 55M? Yes     Comment: mother     Social Determinants of Health     Financial Resource Strain: Low Risk  (10/31/2023)    Financial Resource Strain     Within the past 12 months, have you or your family members you live with been unable to get utilities (heat, electricity) when it was really needed?: No   Food Insecurity: Low Risk  (10/31/2023)    Food Insecurity     Within the past 12  months, did you worry that your food would run out before you got money to buy more?: No     Within the past 12 months, did the food you bought just not last and you didn t have money to get more?: No   Transportation Needs: Low Risk  (10/31/2023)    Transportation Needs     Within the past 12 months, has lack of transportation kept you from medical appointments, getting your medicines, non-medical meetings or appointments, work, or from getting things that you need?: No   Interpersonal Safety: Low Risk  (8/23/2024)    Interpersonal Safety     Do you feel physically and emotionally safe where you currently live?: Yes     Within the past 12 months, have you been hit, slapped, kicked or otherwise physically hurt by someone?: No     Within the past 12 months, have you been humiliated or emotionally abused in other ways by your partner or ex-partner?: No   Housing Stability: Low Risk  (10/31/2023)    Housing Stability     Do you have housing? : Yes     Are you worried about losing your housing?: No      ROS: 10 point ROS neg other than the symptoms noted above in the HPI.      Objective      Diagnostic Testing - Imaging/Labs Reviewed:    Labs:    WCMP on 9/6/24 - hepatic and renal function stable, GFR 70  CBC on 9/6/24 - WNL    Imaging:   Lumbar xray 2/29/24 - multilevel degenerative changes and grade 1 spondylolisthesis.         Physical Exam  HENT:      Head: Normocephalic.   Pulmonary:      Effort: Pulmonary effort is normal.   Musculoskeletal:      Comments: See below.    Neurological:      Mental Status: He is alert.      Comments: See below.    Psychiatric:         Mood and Affect: Mood normal.       Musculoskeletal exam:  Gait intact.   Normal bulk and tone. Unremarkable spinal curvature.     Myofascial tenderness:  no significant TTP  Focal tenderness: No SI joint, gluteal, piriformis, or GT tenderness  Straight leg raise: negative   FENG: negative     Hip exam:   Normal internal and external range of motion  bilaterally. FADIR negative.     Neurologic exam:  CN:  Cranial nerves 2-12 are grossly intact  Motor:  5/5 LE strength    Reflexes:     Patella:  R:  2/4 L: 2/4   Achilles:  R:  1/4 L: 1/4    Sensory:   Light touch: normal bilateral lower extremities    No allodynia, dysesthesia, or hyperalgesia.        BILLING TIME DOCUMENTATION:   The total TIME spent on this patient on the date of the encounter/appointment was 60 minutes.      TOTAL TIME includes:   Time spent preparing to see the patient (reviewing records and tests)   Time spent face to face (or over the phone) with the patient   Time spent ordering tests, medications, procedures and referrals   Time spent Referring and communicating with other healthcare professionals   Time spent documenting clinical information in Epic

## 2024-10-08 ENCOUNTER — TELEPHONE (OUTPATIENT)
Dept: UROLOGY | Facility: CLINIC | Age: 69
End: 2024-10-08
Payer: COMMERCIAL

## 2024-10-08 NOTE — TELEPHONE ENCOUNTER
Patient confirmed scheduled appointment:  Date: 1/23  Time: 8:00  Visit type: RTN  Provider: Conner Murphy  Location: Virtual  Testing/imaging: NA  Additional notes: NA

## 2024-10-09 ENCOUNTER — PATIENT OUTREACH (OUTPATIENT)
Dept: CARE COORDINATION | Facility: CLINIC | Age: 69
End: 2024-10-09
Payer: COMMERCIAL

## 2024-10-15 ENCOUNTER — MYC MEDICAL ADVICE (OUTPATIENT)
Dept: ORTHOPEDICS | Facility: CLINIC | Age: 69
End: 2024-10-15
Payer: COMMERCIAL

## 2024-10-16 ENCOUNTER — TELEPHONE (OUTPATIENT)
Dept: ORTHOPEDICS | Facility: CLINIC | Age: 69
End: 2024-10-16
Payer: COMMERCIAL

## 2024-10-29 ENCOUNTER — MYC MEDICAL ADVICE (OUTPATIENT)
Dept: ORTHOPEDICS | Facility: CLINIC | Age: 69
End: 2024-10-29
Payer: COMMERCIAL

## 2024-10-29 DIAGNOSIS — G56.02 LEFT CARPAL TUNNEL SYNDROME: Primary | ICD-10-CM

## 2024-10-31 ENCOUNTER — TELEPHONE (OUTPATIENT)
Dept: ORTHOPEDICS | Facility: CLINIC | Age: 69
End: 2024-10-31
Payer: COMMERCIAL

## 2024-10-31 NOTE — TELEPHONE ENCOUNTER
Phoned patient to assist with scheduling carpal tunnel surgery with Dr Britt. I left him my direct number to call back when he is able. [phone]

## 2024-11-01 NOTE — TELEPHONE ENCOUNTER
Patient left a  asking for a call back to schedule surgery with Dr. Britt. Patient asked to be called back at [phone].     Janae Remy on 11/1/2024 at 4:26 PM

## 2024-11-04 SDOH — HEALTH STABILITY: PHYSICAL HEALTH: ON AVERAGE, HOW MANY DAYS PER WEEK DO YOU ENGAGE IN MODERATE TO STRENUOUS EXERCISE (LIKE A BRISK WALK)?: 1 DAY

## 2024-11-04 SDOH — HEALTH STABILITY: PHYSICAL HEALTH: ON AVERAGE, HOW MANY MINUTES DO YOU ENGAGE IN EXERCISE AT THIS LEVEL?: 60 MIN

## 2024-11-04 ASSESSMENT — SOCIAL DETERMINANTS OF HEALTH (SDOH): HOW OFTEN DO YOU GET TOGETHER WITH FRIENDS OR RELATIVES?: ONCE A WEEK

## 2024-11-05 ENCOUNTER — MYC MEDICAL ADVICE (OUTPATIENT)
Dept: ORTHOPEDICS | Facility: CLINIC | Age: 69
End: 2024-11-05
Payer: COMMERCIAL

## 2024-11-05 PROBLEM — G56.02 LEFT CARPAL TUNNEL SYNDROME: Status: ACTIVE | Noted: 2024-10-29

## 2024-11-05 NOTE — TELEPHONE ENCOUNTER
[place] [facility] [place]     Ilene Curiel, ATC

## 2024-11-05 NOTE — TELEPHONE ENCOUNTER
Pt has been scheduled for 12/19. And needs a post op appointment 10 days later. Please find post op as I was unable to in the appropriate time frame. Thank you

## 2024-11-07 SDOH — HEALTH STABILITY: PHYSICAL HEALTH: ON AVERAGE, HOW MANY DAYS PER WEEK DO YOU ENGAGE IN MODERATE TO STRENUOUS EXERCISE (LIKE A BRISK WALK)?: 1 DAY

## 2024-11-07 SDOH — HEALTH STABILITY: PHYSICAL HEALTH: ON AVERAGE, HOW MANY MINUTES DO YOU ENGAGE IN EXERCISE AT THIS LEVEL?: 60 MIN

## 2024-11-07 ASSESSMENT — SOCIAL DETERMINANTS OF HEALTH (SDOH): HOW OFTEN DO YOU GET TOGETHER WITH FRIENDS OR RELATIVES?: ONCE A WEEK

## 2024-11-07 ASSESSMENT — PATIENT HEALTH QUESTIONNAIRE - PHQ9
10. IF YOU CHECKED OFF ANY PROBLEMS, HOW DIFFICULT HAVE THESE PROBLEMS MADE IT FOR YOU TO DO YOUR WORK, TAKE CARE OF THINGS AT HOME, OR GET ALONG WITH OTHER PEOPLE: NOT DIFFICULT AT ALL
SUM OF ALL RESPONSES TO PHQ QUESTIONS 1-9: 3
SUM OF ALL RESPONSES TO PHQ QUESTIONS 1-9: 3

## 2024-11-08 ENCOUNTER — OFFICE VISIT (OUTPATIENT)
Dept: FAMILY MEDICINE | Facility: CLINIC | Age: 69
End: 2024-11-08
Payer: COMMERCIAL

## 2024-11-08 VITALS
BODY MASS INDEX: 40.52 KG/M2 | RESPIRATION RATE: 20 BRPM | TEMPERATURE: 96.6 F | DIASTOLIC BLOOD PRESSURE: 86 MMHG | HEIGHT: 70 IN | HEART RATE: 72 BPM | WEIGHT: 283 LBS | SYSTOLIC BLOOD PRESSURE: 135 MMHG | OXYGEN SATURATION: 96 %

## 2024-11-08 DIAGNOSIS — Z00.00 ENCOUNTER FOR MEDICARE ANNUAL WELLNESS EXAM: Primary | ICD-10-CM

## 2024-11-08 DIAGNOSIS — K21.9 GASTROESOPHAGEAL REFLUX DISEASE WITHOUT ESOPHAGITIS: ICD-10-CM

## 2024-11-08 DIAGNOSIS — I10 HYPERTENSION GOAL BP (BLOOD PRESSURE) < 140/90: ICD-10-CM

## 2024-11-08 DIAGNOSIS — M17.12 ARTHRITIS OF LEFT KNEE: ICD-10-CM

## 2024-11-08 DIAGNOSIS — E66.01 MORBID OBESITY (H): ICD-10-CM

## 2024-11-08 DIAGNOSIS — E78.5 HYPERLIPIDEMIA LDL GOAL <130: ICD-10-CM

## 2024-11-08 DIAGNOSIS — Z12.5 SCREENING PSA (PROSTATE SPECIFIC ANTIGEN): ICD-10-CM

## 2024-11-08 DIAGNOSIS — F33.9 RECURRENT MAJOR DEPRESSIVE DISORDER, REMISSION STATUS UNSPECIFIED (H): ICD-10-CM

## 2024-11-08 DIAGNOSIS — R06.83 SNORING: ICD-10-CM

## 2024-11-08 LAB
CHOLEST SERPL-MCNC: 197 MG/DL
FASTING STATUS PATIENT QL REPORTED: NO
HDLC SERPL-MCNC: 53 MG/DL
LDLC SERPL CALC-MCNC: 127 MG/DL
NONHDLC SERPL-MCNC: 144 MG/DL
PSA SERPL DL<=0.01 NG/ML-MCNC: 0.97 NG/ML (ref 0–6.5)
TRIGL SERPL-MCNC: 83 MG/DL

## 2024-11-08 PROCEDURE — G0103 PSA SCREENING: HCPCS | Performed by: FAMILY MEDICINE

## 2024-11-08 PROCEDURE — 36415 COLL VENOUS BLD VENIPUNCTURE: CPT | Performed by: FAMILY MEDICINE

## 2024-11-08 PROCEDURE — G0439 PPPS, SUBSEQ VISIT: HCPCS | Performed by: FAMILY MEDICINE

## 2024-11-08 PROCEDURE — 80061 LIPID PANEL: CPT | Performed by: FAMILY MEDICINE

## 2024-11-08 PROCEDURE — 99214 OFFICE O/P EST MOD 30 MIN: CPT | Mod: 25 | Performed by: FAMILY MEDICINE

## 2024-11-08 NOTE — PATIENT INSTRUCTIONS
Patient Education   Preventive Care Advice   This is general advice given by our system to help you stay healthy. However, your care team may have specific advice just for you. Please talk to your care team about your preventive care needs.  Nutrition  Eat 5 or more servings of fruits and vegetables each day.  Try wheat bread, brown rice and whole grain pasta (instead of white bread, rice, and pasta).  Get enough calcium and vitamin D. Check the label on foods and aim for 100% of the RDA (recommended daily allowance).  Lifestyle  Exercise at least 150 minutes each week  (30 minutes a day, 5 days a week).  Do muscle strengthening activities 2 days a week. These help control your weight and prevent disease.  No smoking.  Wear sunscreen to prevent skin cancer.  Have a dental exam and cleaning every 6 months.  Yearly exams  See your health care team every year to talk about:  Any changes in your health.  Any medicines your care team has prescribed.  Preventive care, family planning, and ways to prevent chronic diseases.  Shots (vaccines)   HPV shots (up to age 26), if you've never had them before.  Hepatitis B shots (up to age 59), if you've never had them before.  COVID-19 shot: Get this shot when it's due.  Flu shot: Get a flu shot every year.  Tetanus shot: Get a tetanus shot every 10 years.  Pneumococcal, hepatitis A, and RSV shots: Ask your care team if you need these based on your risk.  Shingles shot (for age 50 and up)  General health tests  Diabetes screening:  Starting at age 35, Get screened for diabetes at least every 3 years.  If you are younger than age 35, ask your care team if you should be screened for diabetes.  Cholesterol test: At age 39, start having a cholesterol test every 5 years, or more often if advised.  Bone density scan (DEXA): At age 50, ask your care team if you should have this scan for osteoporosis (brittle bones).  Hepatitis C: Get tested at least once in your life.  STIs (sexually  transmitted infections)  Before age 24: Ask your care team if you should be screened for STIs.  After age 24: Get screened for STIs if you're at risk. You are at risk for STIs (including HIV) if:  You are sexually active with more than one person.  You don't use condoms every time.  You or a partner was diagnosed with a sexually transmitted infection.  If you are at risk for HIV, ask about PrEP medicine to prevent HIV.  Get tested for HIV at least once in your life, whether you are at risk for HIV or not.  Cancer screening tests  Cervical cancer screening: If you have a cervix, begin getting regular cervical cancer screening tests starting at age 21.  Breast cancer scan (mammogram): If you've ever had breasts, begin having regular mammograms starting at age 40. This is a scan to check for breast cancer.  Colon cancer screening: It is important to start screening for colon cancer at age 45.  Have a colonoscopy test every 10 years (or more often if you're at risk) Or, ask your provider about stool tests like a FIT test every year or Cologuard test every 3 years.  To learn more about your testing options, visit:   .  For help making a decision, visit:   https://bit.ly/eh57377.  Prostate cancer screening test: If you have a prostate, ask your care team if a prostate cancer screening test (PSA) at age 55 is right for you.  Lung cancer screening: If you are a current or former smoker ages 50 to 80, ask your care team if ongoing lung cancer screenings are right for you.  For informational purposes only. Not to replace the advice of your health care provider. Copyright   2023 [facility]. All rights reserved. Clinically reviewed by the [facility]. [employer] 800783 - REV 01/24.  Preventing Falls: Care Instructions  Injuries and health problems such as trouble walking or poor eyesight can increase your risk of falling. So can some medicines. But there are things you can do to help  "prevent falls. You can exercise to get stronger. You can also arrange your home to make it safer.    Talk to your doctor about the medicines you take. Ask if any of them increase the risk of falls and whether they can be changed or stopped.   Try to exercise regularly. It can help improve your strength and balance. This can help lower your risk of falling.         Practice fall safety and prevention.   Wear low-heeled shoes that fit well and give your feet good support. Talk to your doctor if you have foot problems that make this hard.  Carry a cellphone or wear a medical alert device that you can use to call for help.  Use stepladders instead of chairs to reach high objects. Don't climb if you're at risk for falls. Ask for help, if needed.  Wear the correct eyeglasses, if you need them.        Make your home safer.   Remove rugs, cords, clutter, and furniture from walkways.  Keep your house well lit. Use night-lights in hallways and bathrooms.  Install and use sturdy handrails on stairways.  Wear nonskid footwear, even inside. Don't walk barefoot or in socks without shoes.        Be safe outside.   Use handrails, curb cuts, and ramps whenever possible.  Keep your hands free by using a shoulder bag or backpack.  Try to walk in well-lit areas. Watch out for uneven ground, changes in pavement, and debris.  Be careful in the winter. Walk on the grass or gravel when sidewalks are slippery. Use de-icer on steps and walkways. Add non-slip devices to shoes.    Put grab bars and nonskid mats in your shower or tub and near the toilet. Try to use a shower chair or bath bench when bathing.   Get into a tub or shower by putting in your weaker leg first. Get out with your strong side first. Have a phone or medical alert device in the bathroom with you.   Where can you learn more?  Go to https://www.Picomizewise.net/patiented  Enter G117 in the search box to learn more about \"Preventing Falls: Care Instructions.\"  Current as of: " July 17, 2023  Content Version: 14.2 2024 [facility] [employer].   Care instructions adapted under license by your healthcare professional. If you have questions about a medical condition or this instruction, always ask your healthcare professional. Healthwise[employer] Incorporated disclaims any warranty or liability for your use of this information.    Hearing Loss: Care Instructions  Overview     Hearing loss is a sudden or slow decrease in how well you hear. It can range from slight to profound. Permanent hearing loss can occur with aging. It also can happen when you are exposed long-term to loud noise. Examples include listening to loud music, riding motorcycles, or being around other loud machines.  Hearing loss can affect your work and home life. It can make you feel lonely or depressed. You may feel that you have lost your independence. But hearing aids and other devices can help you hear better and feel connected to others.  Follow-up care is a key part of your treatment and safety. Be sure to make and go to all appointments, and call your doctor if you are having problems. It's also a good idea to know your test results and keep a list of the medicines you take.  How can you care for yourself at home?  Avoid loud noises whenever possible. This helps keep your hearing from getting worse.  Always wear hearing protection around loud noises.  Wear a hearing aid as directed.  A professional can help you pick a hearing aid that will work best for you.  You can also get hearing aids over the counter for mild to moderate hearing loss.  Have hearing tests as your doctor suggests. They can show whether your hearing has changed. Your hearing aid may need to be adjusted.  Use other devices as needed. These may include:  Telephone amplifiers and hearing aids that can connect to a television, stereo, radio, or microphone.  Devices that use lights or vibrations. These alert you to the doorbell, a ringing telephone, or a baby  "monitor.  Television closed-captioning. This shows the words at the bottom of the screen. Most new TVs can do this.  TTY (text telephone). This lets you type messages back and forth on the telephone instead of talking or listening. These devices are also called TDD. When messages are typed on the keyboard, they are sent over the phone line to a receiving TTY. The message is shown on a monitor.  Use text messaging, social media, and email if it is hard for you to communicate by telephone.  Try to learn a listening technique called speechreading. It is not lipreading. You pay attention to people's gestures, expressions, posture, and tone of voice. These clues can help you understand what a person is saying. Face the person you are talking to, and have them face you. Make sure the lighting is good. You need to see the other person's face clearly.  Think about counseling if you need help to adjust to your hearing loss.  When should you call for help?  Watch closely for changes in your health, and be sure to contact your doctor if:    You think your hearing is getting worse.     You have new symptoms, such as dizziness or nausea.   Where can you learn more?  Go to https://www.Ostara.net/patiented  Enter R798 in the search box to learn more about \"Hearing Loss: Care Instructions.\"  Current as of: September 27, 2023  Content Version: 14.2 2024 [facility] [employer].   Care instructions adapted under license by your healthcare professional. If you have questions about a medical condition or this instruction, always ask your healthcare professional. Healthwise, Incorporated disclaims any warranty or liability for your use of this information.    Bladder Training: Care Instructions  Your Care Instructions     Bladder training is used to treat urge incontinence and stress incontinence. Urge incontinence means that the need to urinate comes on so fast that you can't get to a toilet in time. Stress incontinence means " that you leak urine because of pressure on your bladder. For example, it may happen when you laugh, cough, or lift something heavy.  Bladder training can increase how long you can wait before you have to urinate. It can also help your bladder hold more urine. And it can give you better control over the urge to urinate.  It is important to remember that bladder training takes a few weeks to a few months to make a difference. You may not see results right away, but don't give up.  Follow-up care is a key part of your treatment and safety. Be sure to make and go to all appointments, and call your doctor if you are having problems. It's also a good idea to know your test results and keep a list of the medicines you take.  How can you care for yourself at home?  Work with your doctor to come up with a bladder training program that is right for you. You may use one or more of the following methods.  Delayed urination  In the beginning, try to keep from urinating for 5 minutes after you first feel the need to go.  While you wait, take deep, slow breaths to relax. Kegel exercises can also help you delay the need to go to the bathroom.  After some practice, when you can easily wait 5 minutes to urinate, try to wait 10 minutes before you urinate.  Slowly increase the waiting period until you are able to control when you have to urinate.  Scheduled urination  Empty your bladder when you first wake up in the morning.  Schedule times throughout the day when you will urinate.  Start by going to the bathroom every hour, even if you don't need to go.  Slowly increase the time between trips to the bathroom.  When you have found a schedule that works well for you, keep doing it.  If you wake up during the night and have to urinate, do it. Apply your schedule to waking hours only.  Kegel exercises  These tighten and strengthen pelvic muscles, which can help you control the flow of urine. (If doing these exercises causes pain, stop  "doing them and talk with your doctor.) To do Kegel exercises:  Squeeze your muscles as if you were trying not to pass gas. Or squeeze your muscles as if you were stopping the flow of urine. Your belly, legs, and buttocks shouldn't move.  Hold the squeeze for 3 seconds, then relax for 5 to 10 seconds.  Start with 3 seconds, then add 1 second each week until you are able to squeeze for 10 seconds.  Repeat the exercise 10 times a session. Do 3 to 8 sessions a day.  When should you call for help?  Watch closely for changes in your health, and be sure to contact your doctor if:    Your incontinence is getting worse.     You do not get better as expected.   Where can you learn more?  Go to https://www.Giftly.net/patiented  Enter V684 in the search box to learn more about \"Bladder Training: Care Instructions.\"  Current as of: November 15, 2023  Content Version: 14.2 2024 [employer].   Care instructions adapted under license by your healthcare professional. If you have questions about a medical condition or this instruction, always ask your healthcare professional. Healthwise, Incorporated disclaims any warranty or liability for your use of this information.    9 Ways to Cut Back on Drinking  Maybe you've found yourself drinking more alcohol than you'd prefer. If you want to cut back, here are some ideas to try.    Think before you drink.  Do you really want a drink, or is it just a habit? If you're used to having a drink at a certain time, try doing something else then.     Look for substitutes.  Find some no-alcohol drinks that you enjoy, like flavored seltzer water, tea with honey, or tonic with a slice of lime. Or try alcohol-free beer or \"virgin\" cocktails (without the alcohol).     Drink more water.  Use water to quench your thirst. Drink a glass of water before you have any alcohol. Have another glass along with every drink or between drinks.     Shrink your drink.  For example, have a bottle of " "beer instead of a pint. Use a smaller glass for wine. Choose drinks with lower alcohol content (ABV%). Or use less liquor and more mixer in cocktails.     Slow down.  It's easy to drink quickly and without thinking about it. Pay attention, and make each drink last longer.     Do the math.  Total up how much you spend on alcohol each month. How much is that a year? If you cut back, what could you do with the money you save?     Take a break.  Choose a day or two each week when you won't drink at all. Notice how you feel on those days, physically and emotionally. How did you sleep? Do you feel better? Over time, add more break days.     Count calories.  Would you like to lose some weight? For some people that's a good motivator for cutting back. Figure out how many calories are in each drink. How many does that add up to in a day? In a week? In a month?     Practice saying no.  Be ready when someone offers you a drink. Try: \"Thanks, I've had enough.\" Or \"Thanks, but I'm cutting back.\" Or \"No, thanks. I feel better when I drink less.\"   Current as of: November 15, 2023  Content Version: 14.2 2024 [email or web address] [employer].   Care instructions adapted under license by your healthcare professional. If you have questions about a medical condition or this instruction, always ask your healthcare professional. Healthwise, Incorporated disclaims any warranty or liability for your use of this information.  Substance Use Disorder: Care Instructions  Overview     You can improve your life and health by stopping your use of alcohol or drugs. When you don't drink or use drugs, you may feel and sleep better. You may get along better with your family, friends, and coworkers. There are medicines and programs that can help with substance use disorder.  How can you care for yourself at home?  Here are some ways to help you stay sober and prevent relapse.  If you have been given medicine to help keep you sober or reduce your cravings, " be sure to take it exactly as prescribed.  Talk to your doctor about programs that can help you stop using drugs or drinking alcohol.  Do not keep alcohol or drugs in your home.  Plan ahead. Think about what you'll say if other people ask you to drink or use drugs. Try not to spend time with people who drink or use drugs.  Use the time and money spent on drinking or drugs to do something that's important to you.  Preventing a relapse  Have a plan to deal with relapse. Learn to recognize changes in your thinking that lead you to drink or use drugs. Get help before you start to drink or use drugs again.  Try to stay away from situations, friends, or places that may lead you to drink or use drugs.  If you feel the need to drink alcohol or use drugs again, seek help right away. Call a trusted friend or family member. Some people get support from organizations such as Narcotics Anonymous or [employer]! or from treatment facilities.  If you relapse, get help as soon as you can. Some people make a plan with another person that outlines what they want that person to do for them if they relapse. The plan usually includes how to handle the relapse and who to notify in case of relapse.  Don't give up. Remember that a relapse doesn't mean that you have failed. Use the experience to learn the triggers that lead you to drink or use drugs. Then quit again. Recovery is a lifelong process. Many people have several relapses before they are able to quit for good.  Follow-up care is a key part of your treatment and safety. Be sure to make and go to all appointments, and call your doctor if you are having problems. It's also a good idea to know your test results and keep a list of the medicines you take.  When should you call for help?   Call 911  anytime you think you may need emergency care. For example, call if you or someone else:    Has overdosed or has withdrawal signs. Be sure to tell the emergency workers that you are or  "someone else is using or trying to quit using drugs. Overdose or withdrawal signs may include:  Losing consciousness.  Seizure.  Seeing or hearing things that aren't there (hallucinations).     Is thinking or talking about suicide or harming others.   Where to get help 24 hours a day, 7 days a week   If you or someone you know talks about suicide, self-harm, a mental health crisis, a substance use crisis, or any other kind of emotional distress, get help right away. You can:    Call the Suicide and Crisis Lifeline at 517.     Call 4-187-722-Headstrong (1-626.136.4157).     Text HOME to 497422 to access the Crisis Text Line.   Consider saving these numbers in your phone.  Go to [employer] for more information or to chat online.  Call your doctor now or seek immediate medical care if:    You are having withdrawal symptoms. These may include nausea or vomiting, sweating, shakiness, and anxiety.   Watch closely for changes in your health, and be sure to contact your doctor if:    You have a relapse.     You need more help or support to stop.   Where can you learn more?  Go to https://www.Power Liens.net/patiented  Enter H573 in the search box to learn more about \"Substance Use Disorder: Care Instructions.\"  Current as of: November 15, 2023  Content Version: 14.2 2024 [facility] [employer].   Care instructions adapted under license by your healthcare professional. If you have questions about a medical condition or this instruction, always ask your healthcare professional. Healthwise, Incorporated disclaims any warranty or liability for your use of this information.       "

## 2024-11-08 NOTE — PROGRESS NOTES
Preventive Care Visit  [facility]  Clement De Jesus MD, Family Medicine  Nov 8, 2024      ASSESSMENT / PLAN:  (Z00.00) Encounter for Medicare annual wellness exam  (primary encounter diagnosis)  Comment: generally healthy and normal exam. No falls and memory ok  Plan: Reviewed self mole/testicle check handout.  vitmainD.     (E78.5) Hyperlipidemia LDL goal <130  Comment: stable in past  Plan: Lipid panel reflex to direct LDL Non-fasting        Continue statin. Chest pain or shortness of breath to er    (M17.12) Arthritis of left knee  Plan: diclofenac (VOLTAREN) 1 % topical gel        Might get repeat cortisone shot per other    (Z12.5) Screening PSA (prostate specific antigen)    Plan: Prostate Specific Antigen Screen            (F33.9) Recurrent major depressive disorder, remission status unspecified (H)  Comment: needs help  Plan: Adult Mental Health  Referral        Continue meds and ask for psych input. If SUICIAL IDEATION OR HOMOCIDAL IDEATION OR YAKOV TO ER. More exercise and limit ALCOHOL     (I10) Hypertension goal BP (blood pressure) < 140/90  Comment: stable  Plan: continue norvasc    (K21.9) Gastroesophageal reflux disease without esophagitis  Comment: stable  Plan: omeprazole (PRILOSEC) 20 MG DR capsule        Weight loss/diet    (R06.83) Snoring  Comment: possibly reshma  Plan: Adult Sleep Eval & Management          Referral        Weight loss/breath rite    (E66.01) Morbid obesity (H)  Comment: needs help  Plan: lower carb diet/high protein and intermittent fasting. Consider weight loss meds if not improving. Recheck in 6 months        Subjective   Travon is a 69 year old, presenting for the following:  Wellness Visit and Knee Pain (Pt mentions some instabilty in the left knee, thinking a cortisone shot maybe needed. )  History htn, bph, depression, high cholesterol, morbid obesity, ed and gerd. Had urethral surgery.   Outside blood pressure reading - needs more but  ok. Doing more outside time.   No chest pain or shortness of breath. No acute urine changes - stable. No dysuria or hematuria.  No winter vacation. Taking vitaminD. Gerd stable.   Emotionally doing ok and looking forward to summer. Sleep overall ok.   Memory ok and  No  Falls. Outside blood pressure readings ok. No chest pain or shortness of breath. Good overall exercise tolerance.   No nausea, vomiting or diarrhea or black/bloody stools. No urine changes or hematuria.   Emotionally doing good. Sleep overall ok. Seen urology for urethra.   Gerd stable  Every other day prilosec - stable. Taking  MVI/D. No dysphagia.   Albuterol prn was a little helpful. Cough overall improving. Sleep overall. No rashes/mole changes. Memory Ok. Eats meat.   Had shots already.   Hearing a little down.  Urine flow stable. No hematuria allbuterol  prn rare since covid.   Eats meat.  Knee pain - cortisone shot in March. No replacement.  Seen orthopedist.  Carpel tunnel repair.  Would like to see psych. Chronic depression.  No SUICIAL IDEATION OR HOMOCIDAL IDEATION OR YAKOV.   Edibles can be helpful sleep.       11/8/2024    10:00 AM   Additional Questions   Roomed by Yamileth   Accompanied by self         11/8/2024    10:00 AM   Patient Reported Additional Medications   Patient reports taking the following new medications n/a           HPI        Health Care Directive  Patient does not have a Health Care Directive: Discussed advance care planning with patient; information given to patient to review.      11/7/2024   General Health   How would you rate your overall physical health? Good   Feel stress (tense, anxious, or unable to sleep) To some extent      (!) STRESS CONCERN      11/7/2024   Nutrition   Diet: Regular (no restrictions)            11/7/2024   Exercise   Days per week of moderate/strenous exercise 1 day   Average minutes spent exercising at this level 60 min      (!) EXERCISE CONCERN      11/7/2024   Social Factors   Frequency  of gathering with friends or relatives Once a week   Worry food won't last until get money to buy more No   Food not last or not have enough money for food? No   Do you have housing? (Housing is defined as stable permanent housing and does not include staying ouside in a car, in a tent, in an abandoned building, in an overnight shelter, or couch-surfing.) Yes   Are you worried about losing your housing? No   Lack of transportation? No   Unable to get utilities (heat,electricity)? No            11/8/2024   Fall Risk   Gait Speed Test (Document in seconds) 5   Gait Speed Test Interpretation Less than or equal to 5.00 seconds - PASS             11/7/2024   Activities of Daily Living- Home Safety   Needs help with the following daily activites None of the above   Safety concerns in the home None of the above            11/7/2024   Dental   Dentist two times every year? Yes            11/7/2024   Hearing Screening   Hearing concerns? (!) I NEED TO ASK PEOPLE TO SPEAK UP OR REPEAT THEMSELVES.    (!) IT'S HARDER TO UNDERSTAND WOMEN'S VOICES THAN MEN'S VOICES.    (!) IT'S HARD TO FOLLOW A CONVERSATION IN A NOISY RESTAURANT OR CROWDED ROOM.    (!) TROUBLE UNDERSTANDING SOFT OR WHISPERED SPEECH.       Multiple values from one day are sorted in reverse-chronological order         11/7/2024   Driving Risk Screening   Patient/family members have concerns about driving No            11/7/2024   General Alertness/Fatigue Screening   Have you been more tired than usual lately? No            11/7/2024   Urinary Incontinence Screening   Bothered by leaking urine in past 6 months Yes             Today's PHQ-9 Score:       11/7/2024     5:10 PM   PHQ-9 SCORE   PHQ-9 Total Score MyChart 3 (Minimal depression)   PHQ-9 Total Score 3        Patient-reported         11/7/2024   Substance Use   Alcohol more than 3/day or more than 7/wk Yes   How often do you have a drink containing alcohol 2 to 3 times a week   How many alcohol drinks on  typical day 1 or 2   How often do you have 5+ drinks at one occasion Never   Audit 2/3 Score 0   How often not able to stop drinking once started Never   How often failed to do what normally expected Never   How often needed first drink in am after a heavy drinking session Never   How often feeling of guilt or remorse after drinking Never   How often unable to remember what happened the night before Never   Have you or someone else been injured because of your drinking No   Has anyone been concerned or suggested you cut down on drinking No   TOTAL SCORE - AUDIT 3   Do you have a current opioid prescription? No   How severe/bad is pain from 1 to 10? 0/10 (No Pain)   Do you use any other substances recreationally? (!) CANNABIS PRODUCTS        Social History     Tobacco Use    Smoking status: Former     Current packs/day: 0.00     Types: Cigarettes     Quit date: 1970     Years since quittin.8     Passive exposure: Past    Smokeless tobacco: Never    Tobacco comments:     no 2nd hand smoke exposure   Vaping Use    Vaping status: Never Used   Substance Use Topics    Alcohol use: Yes     Comment: socially    Drug use: Yes     Types: Marijuana     Comment: [facility] 2-4 week       ASCVD Risk   The 10-year ASCVD risk score (Placido BRICE, et al., 2019) is: 23.6%    Values used to calculate the score:      Age: 69 years      Sex: Male      Is Non- : No      Diabetic: No      Tobacco smoker: No      Systolic Blood Pressure: 135 mmHg      Is BP treated: Yes      HDL Cholesterol: 53 mg/dL      Total Cholesterol: 199 mg/dL            Reviewed and updated as needed this visit by Provider                      Current providers sharing in care for this patient include:  Patient Care Team:  Clement De Jesus MD as PCP - General  Clement De Jesus MD as Assigned PCP  Christiano Leger MD as MD (Urology)  Clement De Jesus MD as Referring Physician (Family Practice)  Janet Dodd PA-C  "as Physician Assistant (Physician Assistant)  Leatha Silverio AuD as Audiologist (Audiology)  José Miguel Olivo MD as Assigned Surgical Provider  Jose Britt MD as Assigned Musculoskeletal Provider  Arlene Mario PA-C as Physician Assistant (Dermatology)    The following health maintenance items are reviewed in Epic and correct as of today:  Health Maintenance   Topic Date Due    ANNUAL REVIEW OF HM ORDERS  Never done    RSV VACCINE (1 - Risk 60-74 years 1-dose series) Never done    LIPID  11/07/2024    PHQ-9  05/08/2025    BMP  09/06/2025    MEDICARE ANNUAL WELLNESS VISIT  11/08/2025    FALL RISK ASSESSMENT  11/08/2025    GLUCOSE  09/06/2027    ADVANCE CARE PLANNING  11/07/2028    DTAP/TDAP/TD IMMUNIZATION (3 - Td or Tdap) 06/04/2029    COLORECTAL CANCER SCREENING  02/04/2031    HEPATITIS C SCREENING  Completed    DEPRESSION ACTION PLAN  Completed    INFLUENZA VACCINE  Completed    Pneumococcal Vaccine: 65+ Years  Completed    ZOSTER IMMUNIZATION  Completed    AORTIC ANEURYSM SCREENING (SYSTEM ASSIGNED)  Completed    COVID-19 Vaccine  Completed    HPV IMMUNIZATION  Aged Out    MENINGITIS IMMUNIZATION  Aged Out    RSV MONOCLONAL ANTIBODY  Aged Out            Objective    Exam  /86   Pulse 72   Temp (!) 96.6  F (35.9  C) (Tympanic)   Resp 20   Ht 1.778 m (5' 10\")   Wt 128.4 kg (283 lb)   SpO2 96%   BMI 40.61 kg/m     Estimated body mass index is 40.61 kg/m  as calculated from the following:    Height as of this encounter: 1.778 m (5' 10\").    Weight as of this encounter: 128.4 kg (283 lb).    Physical Exam  GENERAL: alert and no distress  EYES: Eyes grossly normal to inspection, PERRL and conjunctivae and sclerae normal  HENT: ear canals and TM's normal, nose and mouth without ulcers or lesions  NECK: no adenopathy, no asymmetry, masses, or scars  RESP: lungs clear to auscultation - no rales, rhonchi or wheezes  BREAST: normal without masses, tenderness or nipple discharge and no palpable axillary masses " or adenopathy  CV: regular rate and rhythm, normal S1 S2, no S3 or S4, no murmur, click or rub, no peripheral edema   ABDOMEN: soft, nontender, no hepatosplenomegaly, no masses and bowel sounds normal  RECTAL (male): normal sphincter tone, no rectal masses, prostate normal size, smooth, nontender without nodules or masses  MS: no gross musculoskeletal defects noted, no edema  SKIN: no suspicious lesions or rashes  NEURO: Normal strength and tone, mentation intact and speech normal  PSYCH: mentation appears normal, affect normal/bright        11/8/2024   Mini Cog   Mini-Cog Not Completed (choose reason) Patient declines            Normal cognition based on my direct observation during interview and exam.               Signed Electronically by: Clement De Jesus MD    Answers submitted by the patient for this visit:  Patient Health Questionnaire (Submitted on 11/7/2024)  If you checked off any problems, how difficult have these problems made it for you to do your work, take care of things at home, or get along with other people?: Not difficult at all  PHQ9 TOTAL SCORE: 3

## 2024-12-19 ENCOUNTER — HOSPITAL ENCOUNTER (OUTPATIENT)
Facility: AMBULATORY SURGERY CENTER | Age: 69
Discharge: HOME | End: 2024-12-19
Attending: STUDENT IN AN ORGANIZED HEALTH CARE EDUCATION/TRAINING PROGRAM | Admitting: STUDENT IN AN ORGANIZED HEALTH CARE EDUCATION/TRAINING PROGRAM
Payer: COMMERCIAL

## 2024-12-19 VITALS
SYSTOLIC BLOOD PRESSURE: 144 MMHG | HEIGHT: 70 IN | DIASTOLIC BLOOD PRESSURE: 69 MMHG | WEIGHT: 270 LBS | HEART RATE: 73 BPM | OXYGEN SATURATION: 98 % | RESPIRATION RATE: 14 BRPM | TEMPERATURE: 96.9 F | BODY MASS INDEX: 38.65 KG/M2

## 2024-12-19 DIAGNOSIS — G56.02 LEFT CARPAL TUNNEL SYNDROME: Primary | ICD-10-CM

## 2024-12-19 RX ORDER — LIDOCAINE HYDROCHLORIDE AND EPINEPHRINE 10; 10 MG/ML; UG/ML
10 INJECTION, SOLUTION INFILTRATION; PERINEURAL ONCE
Status: ACTIVE | OUTPATIENT
Start: 2024-12-19

## 2024-12-19 RX ORDER — OXYCODONE HYDROCHLORIDE 5 MG/1
5 TABLET ORAL EVERY 6 HOURS PRN
Qty: 3 TABLET | Refills: 0 | Status: SHIPPED | OUTPATIENT
Start: 2024-12-19 | End: 2024-12-22

## 2024-12-19 RX ORDER — ACETAMINOPHEN 325 MG/1
975 TABLET ORAL ONCE
Status: COMPLETED | OUTPATIENT
Start: 2024-12-19 | End: 2024-12-19

## 2024-12-19 RX ORDER — LIDOCAINE HYDROCHLORIDE AND EPINEPHRINE 10; 10 MG/ML; UG/ML
INJECTION, SOLUTION INFILTRATION; PERINEURAL PRN
Status: DISCONTINUED | OUTPATIENT
Start: 2024-12-19 | End: 2024-12-19 | Stop reason: HOSPADM

## 2024-12-19 RX ADMIN — ACETAMINOPHEN 975 MG: 325 TABLET ORAL at 14:16

## 2024-12-19 NOTE — OP NOTE
Patient: Tad Olivas  : 1955  MRN: [id number]    DATE OF OPERATION: 2024      OPERATIVE REPORT       PREOPERATIVE DIAGNOSIS:  Left carpal tunnel syndrome     POSTOPERATIVE DIAGNOSIS:  Left carpal tunnel syndrome     PROCEDURE:  Left open carpal tunnel release    SURGEON:  Jose Britt MD     ASSISTANT(S):  Swati Son MD Hand Fellow  Elier Arriaza MD Resident     ANESTHESIA:  Local  Local: 10cc 1% lidocaine 1:100,000 epinephrine     IMPLANTS:   None    ESTIMATED BLOOD LOSS:  1cc    DVT PROPHYLAXIS:  SCDs    TOURNIQUET TIME:  5 minutes    SPECIMENS REMOVED:  None    INTRAOPERATIVE FINDINGS:  Compressed median nerve at the carpal tunnel.     COMPLICATIONS:  None    DISPOSITION:  Stable to PACU.     INDICATIONS:  Tad Olivas is a 69 year old male with a history of left hand numbness and tingling refractory to conservative measures. Electrodiagnostic studies demonstrated evidence of carpal tunnel syndrome.     Indications for surgery were discussed with the patient in detail. After discussing risks, benefits and alternatives to procedure, the patient elected to proceed with surgical intervention.     The patient understood that risks include, but are not limited to: Bleeding, infection, damage to surrounding structures (such as nerve, vessel or tendon), persistent symptoms or numbness, need for additional procedures, pillar pain, stiffness, need for therapy, and anesthetic complications. The patient expressed understanding and agreement and wished to proceed.     Consent was obtained for the procedure.     DESCRIPTION OF PROCEDURE IN DETAIL:  The patient was seen in the preoperative care unit. Patient identity, consent, procedure to be performed, and operative site were verified with the patient. The left upper extremity was marked.     The patient was brought to the operating room and placed supine on the operating room table. The left upper extremity was placed on an armboard. SCDs were  placed on the bilateral lower extremities. The skin over the carpal tunnel was cleansed with chlorhexidine and local anesthetic (10cc of 1% lidocaine 1:100,000 epinephrine) was infiltrated in the skin and subcutaneous tissues over the carpal tunnel.      The left upper extremity was prepped and draped in the usual sterile fashion. A forearm tourniquet had been placed prior to prep. A timeout was performed confirming the correct patient, procedure, and operative site. All were in agreement.    A standard longitudinal 2.5cm incision was made in the skin directly over the carpal tunnel.  Blunt dissection was carried down through the skin and subcutaneous tissues to the superficial palmar fascia. The superficial palmar fascia was divided sharply exposing the transverse carpal ligament. The transverse carpal ligament was then divided sharply and released in its entirety, exposing the median nerve which appeared pale and compressed. Distally, the superficial palmar fascia was divided with scissors under direct visualization, protecting the superficial palmar arch below. Attention was then turned proximally, and the remaining transverse carpal ligament and antebrachial fascia were released under direct visualization. A full median nerve release was performed. The median nerve had excellent excursion at the end of the case.     The wound was copiously irrigated. Hemostasis was achieved. The incision was then closed with interrupted, buried 4-0 Monocryl sutures. A steri-strip was applied. A sterile, bulky soft dressing was placed.     All needle and sponge counts were correct at the end of the procedure. There were no complications.     The patient was awoken from anesthesia and taken to the postoperative recovery unit in stable condition.     I was present and scrubbed for the entire procedure.     POSTOPERATIVE PLAN:  The patient will be discharged home. The patient was instructed to be non-weight bearing. The patient was  instructed on finger range of motion exercises. The dressing will remain in place until follow-up. The patient was instructed to keep it clean and dry. The patient will return to clinic in 10 days for a wound check.    Dr. Britt was present during the entirety of the procedure.     Swati Son MD  Hand Fellow

## 2024-12-19 NOTE — BRIEF OP NOTE
[facility]    Brief Operative Note    Pre-operative diagnosis: Left carpal tunnel syndrome [G56.02]  Post-operative diagnosis Same as pre-operative diagnosis    Procedure: LEFT RELEASE, CARPAL TUNNEL, Left - Wrist    Surgeon: Surgeons and Role:     * Jose Britt MD - Primary     * Elier Arriaza MD - Resident - Assisting     * Swati Son MD - Fellow - Assisting  Anesthesia: Local   Estimated Blood Loss: Minimal    Drains: None  Specimens: * No specimens in log *  Findings:   None.  Complications: None.  Implants: * No implants in log *

## 2024-12-19 NOTE — DISCHARGE INSTRUCTIONS
Procedure Performed: Left carpal tunnel release  Attending Surgeon: Jose Britt MD  Date: 2024    DIAGNOSIS  1. Left carpal tunnel syndrome        MEDICATIONS   Resume all home medications as directed unless otherwise instructed during this hospitalization. If there is any question, double check with your primary care provider.  Start new discharge medications as directed.    Take 1 tablet of 650 mg Tylenol (acetaminophen) Arthritis Strength (extended release) and 1 tablet of Aleve (naproxen) 220 mg in the morning with breakfast and in the evening with dinner.     For breakthrough pain use narcotic pain medication as prescribed.    Do not drive or operate machinery while taking narcotic pain medications.   If you are taking other Tylenol containing medicines at home, be sure NOT to exceed 4 gram's (4000 milligrams) of Tylenol per day.   If you are taking pain medications, be sure to take Colace (docusate sodium) as well to prevent constipation. If constipated, try adding another cathartic or enema.  If nausea and vomiting, call the hospital or seek medical attention.    ACTIVITY   Weight bearin lb coffee cup weight bearing to operative extremity    DIET  Resume same diet prior to your hospital admission.    WOUND   Leave dressing on until you are seen in clinic for your follow up visit.   Watch for signs and symptoms of infection of your wounds including; pain, redness, swelling, drainage or fever.  If you notice any of these symptoms please call or seek medical attention.    Keep wound clean, dry, and intact.  Do not submerge wounds in water until they are healed. No baths, soaking, swimming, or prolonged water exposure for 4 weeks after surgery.    RETURN   Follow-up with Orthopedic Clinic as directed.     Future Appointments   Date Time Provider Department Center   2024  2:40 PM Liza aWng PA-C [facility]   2025 10:00 AM [facility]   2025  8:00 AM  Octavio Murphy PA-C [facility]   3/24/2025  1:30 PM Sina Liu,  [facility]       Call the [facility] at [phone] during business hours for any symptoms such as:    * Fevers with Temperature greater than 101.5 degrees.   * Pus drainage from wound site.   * Severe pain, not controlled by medication.   * Persistent nausea, vomiting and inablility to tolerate fluids.    If you are receiving care in [place], you may call the Orthopedic clinic at [phone].    FOR URGENT PROBLEMS ONLY, after hours or on weekends call the hospital  at [phone] and ask to speak with the orthopedic resident on call.        [facility]  Home Care Following Your Procedure  Call a doctor if you have signs of infection (fever, growing tenderness at the surgery site, a large amount of drainage or bleeding, severe pain, foul-smelling drainage, redness, swelling).             Tylenol/Acetaminophen Consumption    If you feel your pain relief is insufficient, you may take Tylenol/Acetaminophen in addition to your narcotic pain medication.   Be careful not to exceed 4,000 mg of Tylenol/Acetaminophen in a 24 hour period from all sources.  If you are taking extra strength Tylenol/acetaminophen (500 mg), the maximum dose is 8 tablets in 24 hours.  If you are taking regular strength acetaminophen (325 mg), the maximum dose is 12 tablets in 24 hours.    Tylenol 975 mg given at 2:15 pm.   Ok to take more after 8:15 pm.       Your doctor is:       Dr. Jose Britt, Orthopaedics: [phone]             Or dial [phone] and ask for the resident on call for:  Orthopaedics  For emergency care, call the:  [facility]: [phone] (TTY for hearing impaired: [phone])

## 2024-12-30 DIAGNOSIS — N52.33 ERECTILE DYSFUNCTION FOLLOWING URETHRAL SURGERY: ICD-10-CM

## 2025-01-02 RX ORDER — TADALAFIL 5 MG/1
5 TABLET ORAL DAILY
Qty: 90 TABLET | Refills: 0 | Status: SHIPPED | OUTPATIENT
Start: 2025-01-02

## 2025-01-02 NOTE — TELEPHONE ENCOUNTER
tadalafil (CIALIS) 5 MG tablet         Last Written Prescription Date:  1-5-24  Last Fill Quantity: 90,   # refills: 3  Last Office Visit : 1-5-24  Future Office visit:  1-23-25    Routing refill request to provider for review/approval because:  Failed protocol:  Absence of Alpha Blockers on Med list

## 2025-01-06 DIAGNOSIS — N52.33 ERECTILE DYSFUNCTION FOLLOWING URETHRAL SURGERY: ICD-10-CM

## 2025-01-06 DIAGNOSIS — R39.198 SLOW URINARY STREAM: Primary | ICD-10-CM

## 2025-01-06 RX ORDER — FINASTERIDE 5 MG/1
5 TABLET, FILM COATED ORAL DAILY
Qty: 90 TABLET | Refills: 1 | Status: SHIPPED | OUTPATIENT
Start: 2025-01-06

## 2025-01-21 NOTE — PROGRESS NOTES
"Virtual Visit Details    Type of service:  Video Visit   Video Start Time: 8:01 AM  Video End Time:8:22 AM    Originating Location (pt. Location): Home    Distant Location (provider location):  Off-site  Platform used for Video Visit: [facility]    Visit conducted via real-time audio/video technology by Octavio Murphy PA-C to the patient in their home.    Subjective      REASON FOR VISIT  Slow flow and erectile dysfunction follow-up     HISTORY OF PRESENT ILLNESS  Mr. Olivas is a 69 year old male who I am speaking with today in follow-up for his history of weak urinary stream due to weak detrusor seen on UDS with some contributing BPH on Flomax and finasteride, urethral stricture disease repaired by Dr. Leger in 2016, and erectile dysfunction currently utilizing Cialis.  Was last seen virtually on 1/5/2024 at which time plan was to continue above-mentioned medications, and consider InterStim or CIC versus outlet procedure in the future.     Of note, multiple cystoscopies since his stricture repair have not shown any evidence of significant recurrence.    Today:  \"Not any worse necessarily\"  Some increased urgency without urge incontinence   No UTIs or gross hematuria   Asked some questions regarding the dosages that are safe for his tadalafil    Objective      PHYSICAL EXAMINATION  Deferred given virtual visit.    LABS  No urine testing completed since 2022    TESTING  Urodynamic study completed by Janet Dodd PA-C on 5/29/2019    ASSESSMENT/PLAN:  Mr. Tad Olivas is a pleasant 63 year old male with a history of urethral stricture s/p urethoplasty with persistent slow flow who demonstrated the following findings today on urodynamic evaluation:     -Normal bladder capacity (325 mL) with normal filling sensations.  -Normal bladder compliance without DO/DOI.  -No SERGE.  -Fair detrusor contraction during voiding to max Pdet 13 cm H2O.  -Reduced flow rate (Qmax 8.3 mL/s) with a continuous flow curve and complete " bladder emptying (final PVR 0 mL).  -No evidence for DSD or bladder outlet obstruction.  -Fluoroscopy reveals a mildly trabeculated bladder wall without diverticuli or VUR. Bladder neck is closed during filling and open during voiding with some possible narrowing noted within the prostatic urethra.    Assessment & Plan    Slow urinary stream with evidence of weak detrusor contraction  BPH on Flomax and Finasteride  Erectile dysfunction on Cialis  History of urethral stricture s/p repair with Dr. Randolph in 2016    It was my pleasure to speak with Travon in follow-up for his weak urinary stream as well as his erectile dysfunction.  In regards to his urination, I am glad to hear that overall things have been stable, and I believe that continuing on tamsulosin and finasteride is very reasonable.  Carey did ask some hypothetical questions in regards to what would be next if his bladder were to worsen which I answered to the best of my ability.  Specifically, we discussed different potential complications of a weakening of bladder muscle or worsening BPH including urinary retention, urinary tract infections, and/or bladder stones.  We reviewed basic interventions including InterStim and catheterization.     In regards to his erections, we reviewed the proper way to take his tadalafil medication, specifically for him: 5 mg daily with an additional 5 to 15 mg taken as needed 2 to 3 hours prior to anticipated sexual activity.  I did warn him that when taking daily tadalafil and tamsulosin at the same time, some patients can get dizzy.  However, he is not struggling with this so he will continue to watch for this.    Mr. Olivas expressed understanding and agreement to the above discussion and plan and all of his questions were answered to his satisfaction.     PLAN  Renewal of Flomax, finasteride, and tadalafil   Follow-up office visit with Octavio Murphy PA-C in one year with PVR scan    SIGNED    Octavio Murphy  YARED      I spent a total of 30 minutes spent on the date of the encounter doing chart review, history and exam, documentation, and further activities as noted above.

## 2025-01-23 ENCOUNTER — TELEPHONE (OUTPATIENT)
Dept: UROLOGY | Facility: CLINIC | Age: 70
End: 2025-01-23

## 2025-01-23 ENCOUNTER — VIRTUAL VISIT (OUTPATIENT)
Dept: UROLOGY | Facility: CLINIC | Age: 70
End: 2025-01-23
Payer: COMMERCIAL

## 2025-01-23 DIAGNOSIS — R39.12 BENIGN PROSTATIC HYPERPLASIA WITH WEAK URINARY STREAM: ICD-10-CM

## 2025-01-23 DIAGNOSIS — R39.198 SLOW URINARY STREAM: ICD-10-CM

## 2025-01-23 DIAGNOSIS — N52.33 ERECTILE DYSFUNCTION FOLLOWING URETHRAL SURGERY: ICD-10-CM

## 2025-01-23 DIAGNOSIS — N31.2 HYPOCONTRACTILE BLADDER: Primary | ICD-10-CM

## 2025-01-23 DIAGNOSIS — N40.1 BENIGN PROSTATIC HYPERPLASIA WITH WEAK URINARY STREAM: ICD-10-CM

## 2025-01-23 RX ORDER — TAMSULOSIN HYDROCHLORIDE 0.4 MG/1
0.4 CAPSULE ORAL DAILY
Qty: 90 CAPSULE | Refills: 3 | Status: SHIPPED | OUTPATIENT
Start: 2025-01-23

## 2025-01-23 RX ORDER — TADALAFIL 5 MG/1
5 TABLET ORAL DAILY
Qty: 90 TABLET | Refills: 4 | Status: SHIPPED | OUTPATIENT
Start: 2025-01-23

## 2025-01-23 RX ORDER — FINASTERIDE 5 MG/1
1 TABLET, FILM COATED ORAL DAILY
Qty: 90 TABLET | Refills: 3 | Status: SHIPPED | OUTPATIENT
Start: 2025-01-23

## 2025-01-23 NOTE — LETTER
"1/23/2025       RE: Tad Olivas  [address] [facility] [address]     Dear Colleague,    Thank you for referring your patient, Tad Olivas, to the [facility] [place] [facility]. Please see a copy of my visit note below.    Virtual Visit Details    Type of service:  Video Visit   Video Start Time: 8:01 AM  Video End Time:8:22 AM    Originating Location (pt. Location): Home    Distant Location (provider location):  Off-site  Platform used for Video Visit: [facility]    Visit conducted via real-time audio/video technology by Octavio Murphy PA-C to the patient in their home.    Subjective     REASON FOR VISIT  Slow flow and erectile dysfunction follow-up     HISTORY OF PRESENT ILLNESS  Mr. Olivas is a 69 year old male who I am speaking with today in follow-up for his history of weak urinary stream due to weak detrusor seen on UDS with some contributing BPH on Flomax and finasteride, urethral stricture disease repaired by Dr. Leger in 2016, and erectile dysfunction currently utilizing Cialis.  Was last seen virtually on 1/5/2024 at which time plan was to continue above-mentioned medications, and consider InterStim or CIC versus outlet procedure in the future.     Of note, multiple cystoscopies since his stricture repair have not shown any evidence of significant recurrence.    Today:  \"Not any worse necessarily\"  Some increased urgency without urge incontinence   No UTIs or gross hematuria   Asked some questions regarding the dosages that are safe for his tadalafil    Objective     PHYSICAL EXAMINATION  Deferred given virtual visit.    LABS  No urine testing completed since 2022    TESTING  Urodynamic study completed by Janet Dodd PA-C on 5/29/2019    ASSESSMENT/PLAN:  Mr. Tad Olivas is a pleasant 63 year old male with a history of urethral stricture s/p urethoplasty with persistent slow flow who demonstrated the following " findings today on urodynamic evaluation:     -Normal bladder capacity (325 mL) with normal filling sensations.  -Normal bladder compliance without DO/DOI.  -No SERGE.  -Fair detrusor contraction during voiding to max Pdet 13 cm H2O.  -Reduced flow rate (Qmax 8.3 mL/s) with a continuous flow curve and complete bladder emptying (final PVR 0 mL).  -No evidence for DSD or bladder outlet obstruction.  -Fluoroscopy reveals a mildly trabeculated bladder wall without diverticuli or VUR. Bladder neck is closed during filling and open during voiding with some possible narrowing noted within the prostatic urethra.    Assessment & Plan   Slow urinary stream with evidence of weak detrusor contraction  BPH on Flomax and Finasteride  Erectile dysfunction on Cialis  History of urethral stricture s/p repair with Dr. Randolph in 2016    It was my pleasure to speak with Travon in follow-up for his weak urinary stream as well as his erectile dysfunction.  In regards to his urination, I am glad to hear that overall things have been stable, and I believe that continuing on tamsulosin and finasteride is very reasonable.  Carey did ask some hypothetical questions in regards to what would be next if his bladder were to worsen which I answered to the best of my ability.  Specifically, we discussed different potential complications of a weakening of bladder muscle or worsening BPH including urinary retention, urinary tract infections, and/or bladder stones.  We reviewed basic interventions including InterStim and catheterization.     In regards to his erections, we reviewed the proper way to take his tadalafil medication, specifically for him: 5 mg daily with an additional 5 to 15 mg taken as needed 2 to 3 hours prior to anticipated sexual activity.  I did warn him that when taking daily tadalafil and tamsulosin at the same time, some patients can get dizzy.  However, he is not struggling with this so he will continue to watch for this.      Brendon expressed understanding and agreement to the above discussion and plan and all of his questions were answered to his satisfaction.     PLAN  Renewal of Flomax, finasteride, and tadalafil   Follow-up office visit with Octavio Murphy PA-C in one year with PVR scan    SIGNED    Octavio Murphy PA-C      I spent a total of 30 minutes spent on the date of the encounter doing chart review, history and exam, documentation, and further activities as noted above.      Again, thank you for allowing me to participate in the care of your patient.      Sincerely,    Octavio Murphy PA-C

## 2025-01-23 NOTE — TELEPHONE ENCOUNTER
Patient confirmed scheduled appointment:  Date: 1/19/26  Time: 11:15am  Visit type: return patient  Provider: Conner Murphy  [place] [facility] [place]    Additional notes:  1 year with postvoid residual assessment at that time.

## 2025-02-06 ENCOUNTER — OFFICE VISIT (OUTPATIENT)
Dept: ORTHOPEDICS | Facility: CLINIC | Age: 70
End: 2025-02-06
Payer: COMMERCIAL

## 2025-02-06 VITALS — HEIGHT: 70 IN | BODY MASS INDEX: 41.92 KG/M2 | WEIGHT: 292.8 LBS

## 2025-02-06 DIAGNOSIS — M17.12 ARTHRITIS OF LEFT KNEE: Primary | ICD-10-CM

## 2025-02-06 RX ORDER — BETAMETHASONE SODIUM PHOSPHATE AND BETAMETHASONE ACETATE 3; 3 MG/ML; MG/ML
6 INJECTION, SUSPENSION INTRA-ARTICULAR; INTRALESIONAL; INTRAMUSCULAR; SOFT TISSUE
Status: COMPLETED | OUTPATIENT
Start: 2025-02-06 | End: 2025-02-06

## 2025-02-06 RX ORDER — ROPIVACAINE HYDROCHLORIDE 5 MG/ML
4 INJECTION, SOLUTION EPIDURAL; INFILTRATION; PERINEURAL
Status: COMPLETED | OUTPATIENT
Start: 2025-02-06 | End: 2025-02-06

## 2025-02-06 RX ADMIN — BETAMETHASONE SODIUM PHOSPHATE AND BETAMETHASONE ACETATE 6 MG: 3; 3 INJECTION, SUSPENSION INTRA-ARTICULAR; INTRALESIONAL; INTRAMUSCULAR; SOFT TISSUE at 10:45

## 2025-02-06 RX ADMIN — ROPIVACAINE HYDROCHLORIDE 4 ML: 5 INJECTION, SOLUTION EPIDURAL; INFILTRATION; PERINEURAL at 10:45

## 2025-02-06 NOTE — LETTER
2/6/2025      Tad Olivas  [address]   [place] [address]      Dear Colleague,    Thank you for referring your patient, Tad Olivas, to the [facility] [place]. Please see a copy of my visit note below.    ASSESSMENT & PLAN    Travon was seen today for pain.    Diagnoses and all orders for this visit:    Arthritis of left knee  -     Large Joint Injection/Arthocentesis: L knee joint        # Left Knee Arthritis: Tad Olivas  was seen today for left knee pain. Symptoms had been going on for 1+ years. On examination there are positive findings of mild joint line pain. Imaging findings showed mild medial and patellofemoral knee arthritis. Likely cause of patient's condition due to flare of arthritis.  Counseled patient on nature of condition and treatment options.  Given this plan as below, follow-up 1 mon as needed.     Image Findings: mild medial knee and patellofemoral arthritis  Treatment: Activities as tolerated, continue home exercises  Medications/Injections: Limited tylenol/ibuprofen for pain for 1-2 weeks, Topical Voltaren gel, repeat left knee aspiration joint steroid  Follow-up: In one month if symptoms do not improve, sooner if worsening  Can consider gel injection    I was present with the resident during the history and exam.  I discussed the case with the resident and agree with the findings as documented in the assessment and plan.     -----    SUBJECTIVE:  Tad Olivas is a 69 year old male who is seen in follow-up for left knee pain. They were last seen 4/23/24 and left knee joint steroid injection was performed.  Provided good relief for a few months. The patient is seen by themselves.    Since their last visit reports persisting left knee pain.  They have tried left knee joint steroid injection 4/23/24, ice.  The patient uses Voltaren gel and aleve daily and gets decent relief from this. The patient's symptoms have been generally stable since last  "visit and he denies new weakness, skin changes, numbness/tingling in the left leg.       Patient's past medical, surgical, social, and family histories were reviewed today and no changes are noted.    REVIEW OF SYSTEMS:  Constitutional: NEGATIVE for fever, chills, change in weight  Skin: NEGATIVE for worrisome rashes, moles or lesions  GI/: NEGATIVE for bowel or bladder changes  Neuro: NEGATIVE for weakness, dizziness or paresthesias    OBJECTIVE:  Ht 1.778 m (5' 10\")   Wt 132.8 kg (292 lb 12.8 oz)   BMI 42.01 kg/m     General: healthy, alert and in no distress  HEENT: no scleral icterus or conjunctival erythema  Skin: no suspicious lesions or rash. No jaundice.  CV: regular rhythm by palpation, no pedal edema  Resp: normal respiratory effort without conversational dyspnea   Psych: normal mood and affect  Gait: normal steady gait with appropriate coordination and balance  Neuro: normal light touch sensory exam of the extremities.    MSK:    LEFT KNEE  Inspection:    Normal alignment; no edema, erythema, or ecchymosis present  Palpation:    Very mild tender about the lateral joint line and medial joint line. Remainder of bony and ligamentous landmarks are nontender.    No effusion is present    Patellofemoral crepitus is Present  Range of Motion:     00 extension to 1350 flexion  Strength:    Quadriceps 5/5, hamstrings 5/5, gastrocsoleus 5/5, and tibialis anterior 5/5    Extensor mechanism intact      Independent visualization of the below image:  KNEE STANDING AP SUNRISE BILATERAL LATERAL LEFT   4/23/2024 1:13 PM      HISTORY: Left knee pain.     COMPARISON: None.                                                                      IMPRESSION:     Right: Total knee arthroplasty. Excellent position and alignment of  components. No evidence of complication or periprosthetic fracture.     Left: Mild osteoarthrosis of the medial and patellofemoral  compartments. No fracture, effusion or calcified intra-articular " body.     MD Glenroy MCALLISTER MD, [facility]    Disclaimer: This note consists of symbols derived from keyboarding, dictation and/or voice recognition software. As a result, there may be errors in the script that have gone undetected. Please consider this when interpreting information found in this chart.    Large Joint Injection/Arthocentesis: L knee joint    Date/Time: 2/6/2025 10:45 AM    Performed by: Glenroy De La Fuente MD  Authorized by: Glenroy De La Fuente MD    Indications:  Pain and osteoarthritis  Needle Size:  21 G  Guidance: ultrasound    Approach:  Superolateral  Location:  Knee      Medications:  6 mg betamethasone acet & sod phos 6 (3-3) MG/ML; 4 mL ROPivacaine 5 MG/ML  Aspirate amount (mL):  4  Aspirate:  Serous and yellow  Outcome:  Tolerated well, no immediate complications  Procedure discussed: discussed risks, benefits, and alternatives    Consent Given by:  Patient  Timeout: timeout called immediately prior to procedure    Prep: patient was prepped and draped in usual sterile fashion     Ultrasound images of procedure were permanently stored.     Patient reported significant improvement of pain after the numbing portion left knee joint aspiration/steroid injection.  Ultrasound guided images were permanently stored.   Aftercare instructions given to patient.  Plan to follow-up as discussed above.     Glenroy De La Fuente MD [facility]              Again, thank you for allowing me to participate in the care of your patient.        Sincerely,        Glenroy De La Fuente MD    Electronically signed

## 2025-02-06 NOTE — PATIENT INSTRUCTIONS
# Left Knee Arthritis: Tad Olivas  was seen today for left knee pain. Symptoms had been going on for 1+ years. On examination there are positive findings of mild joint line pain. Imaging findings showed mild medial and patellofemoral knee arthritis. Likely cause of patient's condition due to flare of arthritis.  Counseled patient on nature of condition and treatment options.  Given this plan as below, follow-up 1 mon as needed.     Image Findings: mild medial knee and patellofemoral arthritis  Treatment: Activities as tolerated, continue home exercises  Medications/Injections: Limited tylenol/ibuprofen for pain for 1-2 weeks, Topical Voltaren gel, repeat left knee aspiration joint steroid  Follow-up: In one month if symptoms do not improve, sooner if worsening  Can consider gel injection    Please call [phone]   Ask for my team if you have any questions or concerns    If you have not yet received the influenza vaccine but would like to get one, please call  [phone] or you can schedule via [employer]    It was great seeing you again today!    Glenroy De La Fuente MD, CAM     FS Injection Discharge Instructions    Procedure: left knee aspiration/steroid injection     You may shower, however avoid swimming, tub baths or hot tubs for 24 hours following your procedure  You may have a mild to moderate increase in pain for several days following the injection.  It may take up to 14 days for the steroid medication to start working although you may feel the effect as early as a few days after the procedure.  You may use ice packs for 10-15 minutes, 3 to 4 times a day at the injection site for comfort  You may use anti-inflammatory medications (such as Ibuprofen or Aleve or Advil) or Tylenol for pain control if necessary  If you were fasting, you may resume your normal diet and medications after the procedure  If you have diabetes, check your blood sugar more frequently than usual as your blood sugar may be  higher than normal for 10-14 days following a steroid injection. Contact your doctor who manages your diabetes if your blood sugar is higher than usual    If you experience any of the following, [place] [facility] [place] [phone] or [phone]  -Fever over 100 degree F  -Swelling, bleeding, redness, drainage, warmth at the injection site  - New or worsening pain

## 2025-02-06 NOTE — PROGRESS NOTES
ASSESSMENT & PLAN    Travon was seen today for pain.    Diagnoses and all orders for this visit:    Arthritis of left knee  -     Large Joint Injection/Arthocentesis: L knee joint        # Left Knee Arthritis: Tad Olivas  was seen today for left knee pain. Symptoms had been going on for 1+ years. On examination there are positive findings of mild joint line pain. Imaging findings showed mild medial and patellofemoral knee arthritis. Likely cause of patient's condition due to flare of arthritis.  Counseled patient on nature of condition and treatment options.  Given this plan as below, follow-up 1 mon as needed.     Image Findings: mild medial knee and patellofemoral arthritis  Treatment: Activities as tolerated, continue home exercises  Medications/Injections: Limited tylenol/ibuprofen for pain for 1-2 weeks, Topical Voltaren gel, repeat left knee aspiration joint steroid  Follow-up: In one month if symptoms do not improve, sooner if worsening  Can consider gel injection    I was present with the resident during the history and exam.  I discussed the case with the resident and agree with the findings as documented in the assessment and plan.     -----    SUBJECTIVE:  Tad Olivas is a 69 year old male who is seen in follow-up for left knee pain. They were last seen 4/23/24 and left knee joint steroid injection was performed.  Provided good relief for a few months. The patient is seen by themselves.    Since their last visit reports persisting left knee pain.  They have tried left knee joint steroid injection 4/23/24, ice.  The patient uses Voltaren gel and aleve daily and gets decent relief from this. The patient's symptoms have been generally stable since last visit and he denies new weakness, skin changes, numbness/tingling in the left leg.       Patient's past medical, surgical, social, and family histories were reviewed today and no changes are noted.    REVIEW OF SYSTEMS:  Constitutional: NEGATIVE for  "fever, chills, change in weight  Skin: NEGATIVE for worrisome rashes, moles or lesions  GI/: NEGATIVE for bowel or bladder changes  Neuro: NEGATIVE for weakness, dizziness or paresthesias    OBJECTIVE:  Ht 1.778 m (5' 10\")   Wt 132.8 kg (292 lb 12.8 oz)   BMI 42.01 kg/m     General: healthy, alert and in no distress  HEENT: no scleral icterus or conjunctival erythema  Skin: no suspicious lesions or rash. No jaundice.  CV: regular rhythm by palpation, no pedal edema  Resp: normal respiratory effort without conversational dyspnea   Psych: normal mood and affect  Gait: normal steady gait with appropriate coordination and balance  Neuro: normal light touch sensory exam of the extremities.    MSK:    LEFT KNEE  Inspection:    Normal alignment; no edema, erythema, or ecchymosis present  Palpation:    Very mild tender about the lateral joint line and medial joint line. Remainder of bony and ligamentous landmarks are nontender.    No effusion is present    Patellofemoral crepitus is Present  Range of Motion:     00 extension to 1350 flexion  Strength:    Quadriceps 5/5, hamstrings 5/5, gastrocsoleus 5/5, and tibialis anterior 5/5    Extensor mechanism intact      Independent visualization of the below image:  KNEE STANDING AP SUNRISE BILATERAL LATERAL LEFT   4/23/2024 1:13 PM      HISTORY: Left knee pain.     COMPARISON: None.                                                                      IMPRESSION:     Right: Total knee arthroplasty. Excellent position and alignment of  components. No evidence of complication or periprosthetic fracture.     Left: Mild osteoarthrosis of the medial and patellofemoral  compartments. No fracture, effusion or calcified intra-articular body.     MD Glenroy MCALLISTER MD, [facility]    Disclaimer: This note consists of symbols derived from keyboarding, dictation and/or voice recognition software. As a result, there may be errors in the " script that have gone undetected. Please consider this when interpreting information found in this chart.    Large Joint Injection/Arthocentesis: L knee joint    Date/Time: 2/6/2025 10:45 AM    Performed by: Glenroy De La Fuente MD  Authorized by: Glenroy De La Fuente MD    Indications:  Pain and osteoarthritis  Needle Size:  21 G  Guidance: ultrasound    Approach:  Superolateral  Location:  Knee      Medications:  6 mg betamethasone acet & sod phos 6 (3-3) MG/ML; 4 mL ROPivacaine 5 MG/ML  Aspirate amount (mL):  4  Aspirate:  Serous and yellow  Outcome:  Tolerated well, no immediate complications  Procedure discussed: discussed risks, benefits, and alternatives    Consent Given by:  Patient  Timeout: timeout called immediately prior to procedure    Prep: patient was prepped and draped in usual sterile fashion     Ultrasound images of procedure were permanently stored.     Patient reported significant improvement of pain after the numbing portion left knee joint aspiration/steroid injection.  Ultrasound guided images were permanently stored.   Aftercare instructions given to patient.  Plan to follow-up as discussed above.     Glenroy De La Fuente MD [facility] [place] [facility]

## 2025-02-09 DIAGNOSIS — R06.2 WHEEZING: ICD-10-CM

## 2025-02-10 RX ORDER — ALBUTEROL SULFATE 90 UG/1
2 INHALANT RESPIRATORY (INHALATION) EVERY 6 HOURS
Qty: 8.5 G | Refills: 1 | Status: SHIPPED | OUTPATIENT
Start: 2025-02-10

## 2025-03-20 ENCOUNTER — TELEPHONE (OUTPATIENT)
Dept: ORTHOPEDICS | Facility: CLINIC | Age: 70
End: 2025-03-20
Payer: COMMERCIAL

## 2025-03-20 NOTE — TELEPHONE ENCOUNTER
Called patient regarding scheduling surgery with Dr. Britt    Spoke with: Travon    Procedure: LEFT ELBOW EXPLORATION, POSSIBLE RECONSTRUCTION OF DISTAL BICEPS TENDON WITH ALLOGRAFT     Orders placed September 2024    Travon states he has decided to hold off on the left arm for now    States he may want surgery in the fall, but he has not decided     [facility] direct line for surgery scheduling if/when he is ready to proceed    Informed Dr. Britt of patient's decision    Patricia Haile on 3/20/2025 at 4:06 PM

## 2025-03-23 ASSESSMENT — SLEEP AND FATIGUE QUESTIONNAIRES
HOW LIKELY ARE YOU TO NOD OFF OR FALL ASLEEP WHILE WATCHING TV: SLIGHT CHANCE OF DOZING
HOW LIKELY ARE YOU TO NOD OFF OR FALL ASLEEP WHEN YOU ARE A PASSENGER IN A CAR FOR AN HOUR WITHOUT A BREAK: SLIGHT CHANCE OF DOZING
HOW LIKELY ARE YOU TO NOD OFF OR FALL ASLEEP WHILE SITTING INACTIVE IN A PUBLIC PLACE: SLIGHT CHANCE OF DOZING
HOW LIKELY ARE YOU TO NOD OFF OR FALL ASLEEP WHILE LYING DOWN TO REST IN THE AFTERNOON WHEN CIRCUMSTANCES PERMIT: HIGH CHANCE OF DOZING
HOW LIKELY ARE YOU TO NOD OFF OR FALL ASLEEP IN A CAR, WHILE STOPPED FOR A FEW MINUTES IN TRAFFIC: WOULD NEVER DOZE
HOW LIKELY ARE YOU TO NOD OFF OR FALL ASLEEP WHILE SITTING AND READING: MODERATE CHANCE OF DOZING
HOW LIKELY ARE YOU TO NOD OFF OR FALL ASLEEP WHILE SITTING QUIETLY AFTER LUNCH WITHOUT ALCOHOL: MODERATE CHANCE OF DOZING
HOW LIKELY ARE YOU TO NOD OFF OR FALL ASLEEP WHILE SITTING AND TALKING TO SOMEONE: WOULD NEVER DOZE

## 2025-03-24 ENCOUNTER — OFFICE VISIT (OUTPATIENT)
Dept: SLEEP MEDICINE | Facility: CLINIC | Age: 70
End: 2025-03-24
Attending: FAMILY MEDICINE
Payer: COMMERCIAL

## 2025-03-24 VITALS
OXYGEN SATURATION: 94 % | HEIGHT: 70 IN | WEIGHT: 290 LBS | HEART RATE: 79 BPM | BODY MASS INDEX: 41.52 KG/M2 | SYSTOLIC BLOOD PRESSURE: 137 MMHG | RESPIRATION RATE: 16 BRPM | DIASTOLIC BLOOD PRESSURE: 84 MMHG

## 2025-03-24 DIAGNOSIS — F33.1 MAJOR DEPRESSIVE DISORDER, RECURRENT EPISODE, MODERATE (H): ICD-10-CM

## 2025-03-24 DIAGNOSIS — R06.83 SNORING: ICD-10-CM

## 2025-03-24 DIAGNOSIS — G47.10 HYPERSOMNIA: Primary | ICD-10-CM

## 2025-03-24 DIAGNOSIS — E66.01 MORBID OBESITY (H): ICD-10-CM

## 2025-03-24 PROCEDURE — 3075F SYST BP GE 130 - 139MM HG: CPT | Performed by: INTERNAL MEDICINE

## 2025-03-24 PROCEDURE — 3079F DIAST BP 80-89 MM HG: CPT | Performed by: INTERNAL MEDICINE

## 2025-03-24 PROCEDURE — 99204 OFFICE O/P NEW MOD 45 MIN: CPT | Performed by: INTERNAL MEDICINE

## 2025-03-24 PROCEDURE — 1126F AMNT PAIN NOTED NONE PRSNT: CPT | Performed by: INTERNAL MEDICINE

## 2025-03-24 NOTE — NURSING NOTE
"Chief Complaint   Patient presents with    Snoring    Sleep Apnea       Initial /84   Pulse 79   Resp 16   Ht 1.778 m (5' 10\")   Wt 131.5 kg (290 lb)   SpO2 94%   BMI 41.61 kg/m   Estimated body mass index is 41.61 kg/m  as calculated from the following:    Height as of this encounter: 1.778 m (5' 10\").    Weight as of this encounter: 131.5 kg (290 lb).    Medication Reconciliation: complete    Neck circumference: 16.2 inches / 41 centimeters.    DME: N/A    Mesfin Brower CMA on 3/24/2025 at 1:28 PM      "

## 2025-03-24 NOTE — PATIENT INSTRUCTIONS
If you would like to proceed with the sleep study, please feel free to call us so I can put in the order.

## 2025-03-24 NOTE — PROGRESS NOTES
Additional 15 minutes on the date of service was spent performing the following:    -Preparing to see the patient  -Obtaining and/or reviewing separately obtained history   -Ordering medications, tests, or procedures   -Documenting clinical information in the electronic or other health record     Assessment and Plan:    In summary Tad Olivas is a 69 year old year old male here for sleep disturbance.  1.  Hypersomnia/snoring/depression/morbid obesity   Tad Olivas has high risk for obstructive sleep apnea based on the history of hypersomnia, snoring and a crowded airway. I educated the patient on the underlying pathophysiology of obstructive sleep apnea. We reviewed the risks associated with sleep apnea, including increased cardiovascular risk and overall death. We talked about treatments briefly. I recommend getting an split-night nocturnal polysomnography. The patient would like to think about it before making a final decision about proceeding with testing.  I welcomed the patient to follow-up with us on an as-needed basis.    History of present illness:    He is a 69 year old male who comes to the clinic with a chief complaint of excessive daytime sleepiness has been going on for more than 10 years.  While the patient denies any episodes of witnessed apnea he has been told that he does have loud snoring during sleep.  Despite adequate hours of sleep he reports that he is always been kind of a nap especially after lunch.  The patient has been diagnosed with depression and his body mass index is elevated at 41.61.     Ideal Sleep-Wake Cycle(devoid of societal pressure):    Patient would try to initiate sleep at around 9-9:30 PM. Final wake up time is around 6 AM.    TIME IN BED:    1) Work/School Days:    Do you work or go to school? No   What time do you usually get into bed? 9 pm   About how long does it take you to fall asleep? 15 min   How often do you have trouble falling asleep?    How often do you  wake up during the night? 2-3   Do you work days/evenings/nights/rotating shifts?    What wakes you up at night? Snorting self awake    Use the bathroom    Other   Please elaborate: stop breathing   How often do you have trouble falling back to sleep?    About how long does it take to fall back to sleep? 15 min   What do you usually do if you have trouble getting back to sleep? read   What time do you usually get out of bed to start your day? 6 am   Do you use an alarm? No   2) Weekends/Non-work Days/All Other Days    What time do you usually get into bed? 9 pm   About how long does it take you to fall asleep? 15 min   What time do you usually get out of bed to start your day? 6 am   Do you use an alarm? No   SLEEP NEED    On average, about how much sleep do you think you get? 8 hrs   About how much sleep do you think you need? 8 hrs   SLEEP POSITION    Which sleep positions do you prefer? Back    Side   Do you do any of the following activities in bed? Read    Use phone, computer, or tablet   How often do you take a nap on purpose? every day   About how long are your naps? 1 hr   Do you feel better after naps? Yes   How often do you doze off unintentionally?    Have you ever had a driving accident or near-miss due to sleepiness/drowsiness? No     ADRIAN:  ADRIAN Total Score: (Patient-Rptd) 5  Total score - [place]: (Patient-Rptd) 10 (3/23/2025  2:49 PM)    Patient told to return to review the results of the sleep study after it has been interpreted.    Patient Active Problem List   Diagnosis    Biceps tendon tear    HYPERLIPIDEMIA LDL GOAL <130    Elbow pain    Moderate major depression (H)    Erectile dysfunction    Gross hematuria    Urethritis    Renal cysts, acquired, bilateral    Decreased urine stream    Depression    Major depressive disorder, recurrent episode, moderate (H)    Urethral stricture    Hypertension goal BP (blood pressure) < 140/90    History of urethral stricture    Gastroesophageal reflux disease  without esophagitis    Morbid obesity (H)    Carpal tunnel syndrome of right wrist    Rupture of right distal biceps tendon, initial encounter    Aftercare following surgery of the musculoskeletal system, NEC    Chronic bilateral low back pain with bilateral sciatica    Left carpal tunnel syndrome    Hypocontractile bladder    Benign prostatic hyperplasia with weak urinary stream       Past Medical History  Past Medical History:   Diagnosis Date    Biceps rupture, distal     Degenerative joint disease     right knee OA    Depressive disorder     Elevated cholesterol     Erectile dysfunction 12/29/2010    Gastroesophageal reflux disease     Hypertension     Moderate major depression (H) 12/29/2010    Renal cysts, acquired, bilateral 09/26/2012        Past Surgical History  Past Surgical History:   Procedure Laterality Date    BIOPSY  2017    colon    COLONOSCOPY  2017    EYE SURGERY Bilateral     Cataract    ORTHOPEDIC SURGERY      right knee replacement    RELEASE CARPAL TUNNEL Right 04/29/2022    Procedure: RELEASE, CARPAL TUNNEL, right;  Surgeon: FRANKIE Linder MD;  Location: [facility] OR    RELEASE CARPAL TUNNEL Left 12/19/2024    Procedure: LEFT RELEASE, CARPAL TUNNEL;  Surgeon: Jose Britt MD;  Location: [facility] OR    REPAIR TENDON BICEPS DISTAL UPPER EXTREMITY Left     10/15/10    REPAIR TENDON BICEPS DISTAL UPPER EXTREMITY Right 3/15/2024    Procedure: REPAIR, TENDON, RIGHT BICEPS, DISTAL;  Surgeon: Jose Britt MD;  Location: [facility] OR    SOFT TISSUE SURGERY      SURGICAL HISTORY OF -       rt shoulder cartilage repair    SURGICAL HISTORY OF -       cartilage .ligament ,arthroscopy    URETHROPLASTY WITH BUCCAL GRAFT N/A 10/14/2016    Procedure: URETHROPLASTY WITH BUCCAL GRAFT;  Surgeon: Christiano Leger MD;  Location: [facility] OR    UROLOGY PROCEDURE                         DATE:  10/15/2016    urithral stricture         Meds  Current Outpatient Medications   Medication Sig Dispense Refill    acetaminophen  (TYLENOL) 500 MG tablet Take 1-2 tablets (500-1,000 mg) by mouth every 8 hours as needed for mild pain 40 tablet 0    albuterol (PROAIR HFA/PROVENTIL HFA/VENTOLIN HFA) 108 (90 Base) MCG/ACT inhaler INHALE TWO PUFFS BY MOUTH EVERY 6 HOURS 8.5 g 1    amLODIPine (NORVASC) 5 MG tablet TAKE ONE TABLET BY MOUTH ONCE DAILY for blood pressure 90 tablet 3    amoxicillin (AMOXIL) 500 MG capsule Take 4 capsules (2,000 mg) by mouth daily For one dose prior to dental procedures 4 capsule 0    ascorbic acid (VITAMIN C) 500 MG tablet Take 1 tablet by mouth daily. 100 tablet 3    atorvastatin (LIPITOR) 80 MG tablet Take 1 tablet (80 mg) by mouth every morning TAKE ONE TABLET BY MOUTH ONCE DAILY FOR CHOLESTEROL 90 tablet 3    buPROPion (WELLBUTRIN SR) 100 MG 12 hr tablet TAKE ONE TABLET BY MOUTH EVERY MORNING 90 tablet 1    Cholecalciferol (VITAMIN D3 PO) Take 2,000 Units by mouth every morning      cyclobenzaprine (FLEXERIL) 10 MG tablet Take 1 tablet (10 mg) by mouth nightly as needed for muscle spasms 30 tablet 0    diclofenac (VOLTAREN) 1 % topical gel Apply 4 g topically 4 times daily. 150 g 5    finasteride (PROSCAR) 5 MG tablet Take 1 tablet (5 mg) by mouth daily. 90 tablet 3    fish oil-omega-3 fatty acids 500 MG capsule Take by mouth every morning      FLUoxetine (PROZAC) 20 MG capsule Take 1 capsule (20 mg) by mouth daily 90 capsule 1    Glucosamine-Chondroit-Vit C-Mn (GLUCOSAMINE-CHONDROITINOITIN) CAPS Take 1 each by mouth daily. 90 capsule 3    Multiple vitamin TABS Take 1 tablet by mouth daily. 90 tablet 3    Naproxen Sodium (ALEVE PO) Take 220 mg by mouth every morning      omeprazole (PRILOSEC) 20 MG DR capsule TAKE ONE CAPSULE BY MOUTH EVERY OTHER DAY 30-60 MINUTES BEFORE A MEAL FOR HEARTBURN 45 capsule 3    tadalafil (CIALIS) 5 MG tablet Take 1 tablet (5 mg) by mouth daily. Can take an additional 5-15 mg as needed at least 2-3 hours prior to anticipated sexual activity. Additional refills given for this purpose. 90  tablet 4    tamsulosin (FLOMAX) 0.4 MG capsule Take 1 capsule (0.4 mg) by mouth daily. 90 capsule 3    diazepam (VALIUM) 5 MG tablet Take 60 minutes prior to procedure (Patient not taking: Reported on 3/24/2025) 1 tablet 0        Allergies  Patient has no known allergies.     Social History  Social History     Socioeconomic History    Marital status: Single     Spouse name: Not on file    Number of children: Not on file    Years of education: Not on file    Highest education level: Not on file   Occupational History    Not on file   Tobacco Use    Smoking status: Former     Current packs/day: 0.00     Types: Cigarettes     Quit date: 1970     Years since quittin.2     Passive exposure: Past    Smokeless tobacco: Never    Tobacco comments:     no 2nd hand smoke exposure   Vaping Use    Vaping status: Never Used   Substance and Sexual Activity    Alcohol use: Yes     Comment: socially    Drug use: Yes     Types: Marijuana     Comment: [facility] 2-4 week    Sexual activity: Not Currently     Partners: Female   Other Topics Concern    Parent/sibling w/ CABG, MI or angioplasty before 65F 55M? Yes     Comment: mother   Social History Narrative    Not on file     Social Drivers of Health     Financial Resource Strain: Low Risk  (2024)    Financial Resource Strain     Within the past 12 months, have you or your family members you live with been unable to get utilities (heat, electricity) when it was really needed?: No   Food Insecurity: Low Risk  (2024)    Food Insecurity     Within the past 12 months, did you worry that your food would run out before you got money to buy more?: No     Within the past 12 months, did the food you bought just not last and you didn t have money to get more?: No   Transportation Needs: Low Risk  (2024)    Transportation Needs     Within the past 12 months, has lack of transportation kept you from medical appointments, getting your medicines, non-medical meetings or  appointments, work, or from getting things that you need?: No   Physical Activity: Insufficiently Active (11/7/2024)    Exercise Vital Sign     Days of Exercise per Week: 1 day     Minutes of Exercise per Session: 60 min   Stress: Stress Concern Present (11/7/2024)    [place] [employer] - Occupational Stress Questionnaire     Feeling of Stress : To some extent   Social Connections: Unknown (11/7/2024)    Social Connection and Isolation Panel [NHANES]     Frequency of Communication with Friends and Family: Not on file     Frequency of Social Gatherings with Friends and Family: Once a week     Attends Yazdanism Services: Not on file     Active Member of Clubs or Organizations: Not on file     Attends Club or Organization Meetings: Not on file     Marital Status: Not on file   Interpersonal Safety: Low Risk  (12/19/2024)    Interpersonal Safety     Do you feel physically and emotionally safe where you currently live?: Yes     Within the past 12 months, have you been hit, slapped, kicked or otherwise physically hurt by someone?: No     Within the past 12 months, have you been humiliated or emotionally abused in other ways by your partner or ex-partner?: No   Housing Stability: Low Risk  (11/7/2024)    Housing Stability     Do you have housing? : Yes     Are you worried about losing your housing?: No        Family History  Family History   Problem Relation Age of Onset    Coronary Artery Disease Mother     Hyperlipidemia Mother     Diabetes Father     Cerebrovascular Disease Father     Breast Cancer Father     Anesthesia Reaction Father         reaction unkown    Hypertension Father     Snoring Father     Hyperlipidemia Sister     Venous thrombosis No family hx of       Review of Systems:  Constitutional: Negative except as noted in HPI.   Eyes: Negative except as noted in HPI.   ENT: Negative except as noted in HPI.   Cardiovascular: Negative except as noted in HPI.   Respiratory: Negative except  "as noted in HPI.   Gastrointestinal: Negative except as noted in HPI.   Genitourinary: Negative except as noted in HPI.   Musculoskeletal: Negative except as noted in HPI.   Integumentary: Negative except as noted in HPI.   Neurological: Negative except as noted in HPI.   Psychiatric: Negative except as noted in HPI.   Endocrine: Negative except as noted in HPI.   Hematologic/Lymphatic: Negative except as noted in HPI.      Physical Exam:  /84   Pulse 79   Resp 16   Ht 1.778 m (5' 10\")   Wt 131.5 kg (290 lb)   SpO2 94%   BMI 41.61 kg/m    BMI:Body mass index is 41.61 kg/m .   GEN: NAD, morbidly obese  Head: Normocephalic.  EYES: PERRLA, EOMI  ENT: Oropharynx is clear, Riddle class 4+ airway.   Nasal mucosa is moist without erythema  Neck : Thyroid is within normal limits.   CV: Regular rate and rhythm, S1 & S2 positive.  LUNGS: Bilateral breathsounds heard.   ABDOMEN: Positive bowel sounds in all quadrants, soft, no rebound or guarding  MUSCULOSKELETAL: Bilateral 1+ leg swelling  SKIN: warm, dry, no rashes  Neurological: Alert, Gait is normal. Strength 5/5 in all extremities.  Psych: normal mood, normal affect     Labs/Studies:     Lab Results   Component Value Date    TSH 2.60 12/29/2016     Lab Results   Component Value Date     (H) 09/06/2024     (H) 11/07/2023     Lab Results   Component Value Date    HGB 14.1 09/06/2024    HGB 15.3 11/03/2022     Lab Results   Component Value Date    BUN 23.0 09/06/2024    BUN 21.8 11/07/2023    CR 1.13 09/06/2024    CR 1.25 (H) 11/07/2023     Lab Results   Component Value Date    AST 27 09/06/2024    AST 21 11/07/2023    ALT 38 09/06/2024    ALT 25 11/07/2023    ALKPHOS 74 09/06/2024    ALKPHOS 82 11/07/2023    BILITOTAL 0.7 09/06/2024    BILITOTAL 1.3 (H) 11/07/2023     Patient was strongly advised in AVS to avoid driving, operating any heavy machinery or other hazardous situations while drowsy or sleepy. Patient was counseled on the importance of " driving while alert, to pull over if drowsy, or nap before getting into the vehicle if sleepy.     Patient verbalized understanding of these issues, agrees with the plan and all questions were answered today. Patient was given an opportuntity to voice any other symptoms or concerns not listed above. Patient did not have any other symptoms or concerns.         Sina Liu DO,   Board Certified in Internal Medicine and Sleep Medicine    (Note created with Dragon voice recognition and unintended spelling errors and word substitutions may occur)

## 2025-05-05 DIAGNOSIS — I10 HYPERTENSION GOAL BP (BLOOD PRESSURE) < 140/90: ICD-10-CM

## 2025-05-05 RX ORDER — AMLODIPINE BESYLATE 5 MG/1
TABLET ORAL
Qty: 90 TABLET | Refills: 1 | Status: SHIPPED | OUTPATIENT
Start: 2025-05-05

## 2025-07-23 DIAGNOSIS — F33.1 MAJOR DEPRESSIVE DISORDER, RECURRENT EPISODE, MODERATE (H): ICD-10-CM

## 2025-07-23 NOTE — TELEPHONE ENCOUNTER
Left message on answering machine for patient to call back to [phone].  Naomy Fernandez BSN, RN

## 2025-07-23 NOTE — TELEPHONE ENCOUNTER
Pt had extra prescription at home.  He has been taking this daily and does need refill now.  Naomy Fernandez BSN, RN

## 2025-07-23 NOTE — TELEPHONE ENCOUNTER
Clinic RN: Please investigate patient's chart or contact patient if the information cannot be found because patient should have run out of this medication on 11/7/24. Confirm patient is taking this medication as prescribed. Document findings and route refill encounter to provider for approval or denial.    Lyssa Friend, RN on 7/23/2025 at 8:37 AM

## 2025-08-02 DIAGNOSIS — E78.5 HYPERLIPIDEMIA LDL GOAL <130: ICD-10-CM

## 2025-08-02 DIAGNOSIS — F33.1 MAJOR DEPRESSIVE DISORDER, RECURRENT EPISODE, MODERATE (H): ICD-10-CM

## 2025-08-04 RX ORDER — BUPROPION HYDROCHLORIDE 100 MG/1
100 TABLET, EXTENDED RELEASE ORAL EVERY MORNING
Qty: 90 TABLET | Refills: 1 | Status: SHIPPED | OUTPATIENT
Start: 2025-08-04

## 2025-08-04 RX ORDER — ATORVASTATIN CALCIUM 80 MG/1
80 TABLET, FILM COATED ORAL EVERY MORNING
Qty: 90 TABLET | Refills: 0 | Status: SHIPPED | OUTPATIENT
Start: 2025-08-04

## 2025-08-18 ENCOUNTER — TELEPHONE (OUTPATIENT)
Dept: UROLOGY | Facility: CLINIC | Age: 70
End: 2025-08-18
Payer: COMMERCIAL

## (undated) DEVICE — PREP CHLORAPREP 26ML TINTED ORANGE  260815

## (undated) DEVICE — DRSG STERI STRIP 1X5" R1548

## (undated) DEVICE — PACK HAND CUSTOM ASC

## (undated) DEVICE — BNDG ELASTIC 4"X5YDS UNSTERILE 6611-40

## (undated) DEVICE — DRAPE STERI U 1015

## (undated) DEVICE — SU VICRYL 3-0 SH 27" UND J416H

## (undated) DEVICE — CAST PADDING 4" STERILE 9044S

## (undated) DEVICE — NDL 19GA 1.5"

## (undated) DEVICE — SOL WATER IRRIG 500ML BOTTLE 2F7113

## (undated) DEVICE — SOL NACL 0.9% IRRIG 500ML BOTTLE 2F7123

## (undated) DEVICE — DRSG XEROFORM 1X8"

## (undated) DEVICE — LINEN ORTHO PACK 5446

## (undated) DEVICE — GLOVE PROTEXIS W/NEU-THERA 8.0  2D73TE80

## (undated) DEVICE — PACK HAND WRIST SOP15HWFSP

## (undated) DEVICE — GLOVE PROTEXIS W/NEU-THERA 7.0  2D73TE70

## (undated) DEVICE — DRSG ABDOMINAL 07 1/2X8" 7197D

## (undated) DEVICE — CAST PLASTER SPLINT 4X15" 7394

## (undated) DEVICE — PREP CHLORAPREP CLEAR 3ML 260400

## (undated) DEVICE — DRAPE C-ARM OEC MINI VIEW 6800   00-901917-01

## (undated) DEVICE — DRAPE STERI TOWEL SM 1000

## (undated) DEVICE — GLOVE BIOGEL PI MICRO SZ 7.0 48570

## (undated) DEVICE — TONGUE DEPRESSOR STERILE 6023

## (undated) DEVICE — SU MONOCRYL 4-0 PS-2 27" UND Y426H

## (undated) DEVICE — SU ETHILON 5-0 P-3 18" BLACK 698G

## (undated) DEVICE — COVER CAMERA IN-LIGHT DISP LT-C02

## (undated) DEVICE — GLOVE PROTEXIS BLUE W/NEU-THERA 7.0  2D73EB70

## (undated) DEVICE — SUCTION MANIFOLD NEPTUNE 2 SYS 1 PORT 702-025-000

## (undated) RX ORDER — LIDOCAINE HYDROCHLORIDE AND EPINEPHRINE 10; 10 MG/ML; UG/ML
INJECTION, SOLUTION INFILTRATION; PERINEURAL
Status: DISPENSED
Start: 2024-03-15

## (undated) RX ORDER — LIDOCAINE HYDROCHLORIDE 10 MG/ML
INJECTION, SOLUTION EPIDURAL; INFILTRATION; INTRACAUDAL; PERINEURAL
Status: DISPENSED
Start: 2024-04-29

## (undated) RX ORDER — FENTANYL CITRATE-0.9 % NACL/PF 10 MCG/ML
PLASTIC BAG, INJECTION (ML) INTRAVENOUS
Status: DISPENSED
Start: 2024-03-15

## (undated) RX ORDER — FENTANYL CITRATE 50 UG/ML
INJECTION, SOLUTION INTRAMUSCULAR; INTRAVENOUS
Status: DISPENSED
Start: 2024-03-15

## (undated) RX ORDER — ACETAMINOPHEN 325 MG/1
TABLET ORAL
Status: DISPENSED
Start: 2024-12-19

## (undated) RX ORDER — PROPOFOL 10 MG/ML
INJECTION, EMULSION INTRAVENOUS
Status: DISPENSED
Start: 2024-03-15

## (undated) RX ORDER — CEFAZOLIN SODIUM 2 G/50ML
SOLUTION INTRAVENOUS
Status: DISPENSED
Start: 2024-03-15

## (undated) RX ORDER — LIDOCAINE HYDROCHLORIDE AND EPINEPHRINE 10; 10 MG/ML; UG/ML
INJECTION, SOLUTION INFILTRATION; PERINEURAL
Status: DISPENSED
Start: 2024-12-19

## (undated) RX ORDER — SULFAMETHOXAZOLE/TRIMETHOPRIM 800-160 MG
TABLET ORAL
Status: DISPENSED
Start: 2019-05-29

## (undated) RX ORDER — DEXAMETHASONE SODIUM PHOSPHATE 4 MG/ML
INJECTION, SOLUTION INTRA-ARTICULAR; INTRALESIONAL; INTRAMUSCULAR; INTRAVENOUS; SOFT TISSUE
Status: DISPENSED
Start: 2024-04-29

## (undated) RX ORDER — ACETAMINOPHEN 325 MG/1
TABLET ORAL
Status: DISPENSED
Start: 2024-03-15

## (undated) RX ORDER — CEFAZOLIN SODIUM 1 G/3ML
INJECTION, POWDER, FOR SOLUTION INTRAMUSCULAR; INTRAVENOUS
Status: DISPENSED
Start: 2024-03-15